# Patient Record
Sex: FEMALE | Race: WHITE | NOT HISPANIC OR LATINO | Employment: PART TIME | URBAN - METROPOLITAN AREA
[De-identification: names, ages, dates, MRNs, and addresses within clinical notes are randomized per-mention and may not be internally consistent; named-entity substitution may affect disease eponyms.]

---

## 2017-01-12 ENCOUNTER — PRENATAL OFFICE VISIT - HEALTHEAST (OUTPATIENT)
Dept: MIDWIFE SERVICES | Facility: CLINIC | Age: 42
End: 2017-01-12

## 2017-01-12 DIAGNOSIS — L29.9 ITCHING: ICD-10-CM

## 2017-01-12 DIAGNOSIS — Z34.00 SUPERVISION OF NORMAL FIRST PREGNANCY: ICD-10-CM

## 2017-01-12 DIAGNOSIS — F17.200 TOBACCO USE DISORDER: ICD-10-CM

## 2017-01-12 DIAGNOSIS — Z00.00 HEALTH CARE MAINTENANCE: ICD-10-CM

## 2017-01-12 DIAGNOSIS — F41.9 ANXIETY: ICD-10-CM

## 2017-01-12 ASSESSMENT — MIFFLIN-ST. JEOR: SCORE: 1312.46

## 2017-01-13 LAB — SYPHILIS RPR SCREEN - HISTORICAL: NORMAL

## 2017-01-30 ENCOUNTER — PRENATAL OFFICE VISIT - HEALTHEAST (OUTPATIENT)
Dept: MIDWIFE SERVICES | Facility: CLINIC | Age: 42
End: 2017-01-30

## 2017-01-30 DIAGNOSIS — O09.513 ENCOUNTER FOR SUPERVISION OF PRIMIGRAVIDA OF ADVANCED MATERNAL AGE IN THIRD TRIMESTER: ICD-10-CM

## 2017-01-30 ASSESSMENT — MIFFLIN-ST. JEOR: SCORE: 1362.35

## 2017-02-16 ENCOUNTER — PRENATAL OFFICE VISIT - HEALTHEAST (OUTPATIENT)
Dept: MIDWIFE SERVICES | Facility: CLINIC | Age: 42
End: 2017-02-16

## 2017-02-16 DIAGNOSIS — Z34.00 SUPERVISION OF NORMAL FIRST PREGNANCY: ICD-10-CM

## 2017-02-16 DIAGNOSIS — O09.513 ENCOUNTER FOR SUPERVISION OF PRIMIGRAVIDA OF ADVANCED MATERNAL AGE IN THIRD TRIMESTER: ICD-10-CM

## 2017-02-16 ASSESSMENT — MIFFLIN-ST. JEOR: SCORE: 1370.06

## 2017-03-06 ENCOUNTER — PRENATAL OFFICE VISIT - HEALTHEAST (OUTPATIENT)
Dept: MIDWIFE SERVICES | Facility: CLINIC | Age: 42
End: 2017-03-06

## 2017-03-06 DIAGNOSIS — Z34.00 SUPERVISION OF NORMAL FIRST PREGNANCY: ICD-10-CM

## 2017-03-06 ASSESSMENT — MIFFLIN-ST. JEOR: SCORE: 1400.91

## 2017-03-16 ENCOUNTER — PRENATAL OFFICE VISIT - HEALTHEAST (OUTPATIENT)
Dept: MIDWIFE SERVICES | Facility: CLINIC | Age: 42
End: 2017-03-16

## 2017-03-16 DIAGNOSIS — H65.92 OTITIS MEDIA WITH EFFUSION, LEFT: ICD-10-CM

## 2017-03-16 DIAGNOSIS — H10.32 ACUTE BACTERIAL CONJUNCTIVITIS OF LEFT EYE: ICD-10-CM

## 2017-03-16 DIAGNOSIS — Z34.00 SUPERVISION OF NORMAL FIRST PREGNANCY: ICD-10-CM

## 2017-03-20 ENCOUNTER — PRENATAL OFFICE VISIT - HEALTHEAST (OUTPATIENT)
Dept: MIDWIFE SERVICES | Facility: CLINIC | Age: 42
End: 2017-03-20

## 2017-03-20 DIAGNOSIS — O09.513 ENCOUNTER FOR SUPERVISION OF PRIMIGRAVIDA OF ADVANCED MATERNAL AGE IN THIRD TRIMESTER: ICD-10-CM

## 2017-03-20 ASSESSMENT — MIFFLIN-ST. JEOR: SCORE: 1453.98

## 2017-03-27 ENCOUNTER — PRENATAL OFFICE VISIT - HEALTHEAST (OUTPATIENT)
Dept: MIDWIFE SERVICES | Facility: CLINIC | Age: 42
End: 2017-03-27

## 2017-03-27 DIAGNOSIS — O09.513 ENCOUNTER FOR SUPERVISION OF PRIMIGRAVIDA OF ADVANCED MATERNAL AGE IN THIRD TRIMESTER: ICD-10-CM

## 2017-03-27 ASSESSMENT — MIFFLIN-ST. JEOR: SCORE: 1444

## 2017-04-03 ENCOUNTER — SURGERY - HEALTHEAST (OUTPATIENT)
Dept: OBGYN | Facility: HOSPITAL | Age: 42
End: 2017-04-03

## 2017-04-03 ENCOUNTER — ANESTHESIA - HEALTHEAST (OUTPATIENT)
Dept: OBGYN | Facility: HOSPITAL | Age: 42
End: 2017-04-03

## 2017-04-03 ASSESSMENT — MIFFLIN-ST. JEOR: SCORE: 1444

## 2017-04-06 ENCOUNTER — HOME CARE/HOSPICE - HEALTHEAST (OUTPATIENT)
Dept: HOME HEALTH SERVICES | Facility: HOME HEALTH | Age: 42
End: 2017-04-06

## 2017-04-08 ENCOUNTER — HOME CARE/HOSPICE - HEALTHEAST (OUTPATIENT)
Dept: HOME HEALTH SERVICES | Facility: HOME HEALTH | Age: 42
End: 2017-04-08

## 2017-04-11 ENCOUNTER — COMMUNICATION - HEALTHEAST (OUTPATIENT)
Dept: MIDWIFE SERVICES | Facility: CLINIC | Age: 42
End: 2017-04-11

## 2017-04-12 ENCOUNTER — COMMUNICATION - HEALTHEAST (OUTPATIENT)
Dept: FAMILY MEDICINE | Facility: CLINIC | Age: 42
End: 2017-04-12

## 2017-05-22 ENCOUNTER — OFFICE VISIT - HEALTHEAST (OUTPATIENT)
Dept: MIDWIFE SERVICES | Facility: CLINIC | Age: 42
End: 2017-05-22

## 2017-05-22 DIAGNOSIS — Z00.00 HEALTH CARE MAINTENANCE: ICD-10-CM

## 2017-05-22 DIAGNOSIS — Z30.018 ENCOUNTER FOR INITIAL PRESCRIPTION OF OTHER CONTRACEPTIVES: ICD-10-CM

## 2017-05-22 ASSESSMENT — MIFFLIN-ST. JEOR: SCORE: 1382.76

## 2017-05-26 LAB
HPV INTERPRETATION - HISTORICAL: ABNORMAL
HPV INTERPRETER - HISTORICAL: ABNORMAL

## 2017-05-30 ENCOUNTER — COMMUNICATION - HEALTHEAST (OUTPATIENT)
Dept: OBGYN | Facility: CLINIC | Age: 42
End: 2017-05-30

## 2017-05-30 DIAGNOSIS — R87.621: ICD-10-CM

## 2017-05-30 LAB
BKR LAB AP ABNORMAL BLEEDING: NO
BKR LAB AP BIRTH CONTROL/HORMONES: ABNORMAL
BKR LAB AP CERVICAL APPEARANCE: ABNORMAL
BKR LAB AP GYN ADEQUACY: ABNORMAL
BKR LAB AP GYN INTERPRETATION: ABNORMAL
BKR LAB AP HPV REFLEX: ABNORMAL
BKR LAB AP LMP: ABNORMAL
BKR LAB AP PATIENT STATUS: ABNORMAL
BKR LAB AP PREVIOUS ABNORMAL: ABNORMAL
BKR LAB AP PREVIOUS NORMAL: ABNORMAL
HIGH RISK?: NO
PATH REPORT.COMMENTS IMP SPEC: ABNORMAL
RESULT FLAG (HE HISTORICAL CONVERSION): ABNORMAL

## 2017-06-10 ENCOUNTER — COMMUNICATION - HEALTHEAST (OUTPATIENT)
Dept: MIDWIFE SERVICES | Facility: CLINIC | Age: 42
End: 2017-06-10

## 2017-06-12 ENCOUNTER — COMMUNICATION - HEALTHEAST (OUTPATIENT)
Dept: MIDWIFE SERVICES | Facility: CLINIC | Age: 42
End: 2017-06-12

## 2017-06-12 ENCOUNTER — AMBULATORY - HEALTHEAST (OUTPATIENT)
Dept: OBGYN | Facility: CLINIC | Age: 42
End: 2017-06-12

## 2017-06-12 ENCOUNTER — COMMUNICATION - HEALTHEAST (OUTPATIENT)
Dept: OBGYN | Facility: CLINIC | Age: 42
End: 2017-06-12

## 2017-07-12 ENCOUNTER — OFFICE VISIT - HEALTHEAST (OUTPATIENT)
Dept: FAMILY MEDICINE | Facility: CLINIC | Age: 42
End: 2017-07-12

## 2017-07-12 DIAGNOSIS — F98.8 ATTENTION DEFICIT DISORDER: ICD-10-CM

## 2017-07-12 DIAGNOSIS — F41.9 ANXIETY: ICD-10-CM

## 2017-07-18 ENCOUNTER — AMBULATORY - HEALTHEAST (OUTPATIENT)
Dept: OBGYN | Facility: CLINIC | Age: 42
End: 2017-07-18

## 2017-07-18 DIAGNOSIS — Z01.812 PRE-PROCEDURE LAB EXAM: ICD-10-CM

## 2017-07-18 DIAGNOSIS — R87.611 ATYPICAL SQUAMOUS CELLS CANNOT EXCLUDE HIGH GRADE SQUAMOUS INTRAEPITHELIAL LESION ON CYTOLOGIC SMEAR OF CERVIX (ASC-H): ICD-10-CM

## 2017-07-18 ASSESSMENT — MIFFLIN-ST. JEOR: SCORE: 1371.42

## 2017-07-24 LAB
LAB AP CHARGES (HE HISTORICAL CONVERSION): NORMAL
PATH REPORT.COMMENTS IMP SPEC: NORMAL
PATH REPORT.COMMENTS IMP SPEC: NORMAL
PATH REPORT.FINAL DX SPEC: NORMAL
PATH REPORT.GROSS SPEC: NORMAL
PATH REPORT.MICROSCOPIC SPEC OTHER STN: NORMAL
PATH REPORT.MICROSCOPIC SPEC OTHER STN: NORMAL
PATH REPORT.RELEVANT HX SPEC: NORMAL
RESULT FLAG (HE HISTORICAL CONVERSION): NORMAL

## 2017-08-08 ENCOUNTER — OFFICE VISIT - HEALTHEAST (OUTPATIENT)
Dept: FAMILY MEDICINE | Facility: CLINIC | Age: 42
End: 2017-08-08

## 2017-08-08 DIAGNOSIS — F41.9 ANXIETY: ICD-10-CM

## 2017-08-08 DIAGNOSIS — F98.8 ATTENTION DEFICIT DISORDER: ICD-10-CM

## 2017-08-08 ASSESSMENT — MIFFLIN-ST. JEOR: SCORE: 1391.38

## 2017-09-05 ENCOUNTER — COMMUNICATION - HEALTHEAST (OUTPATIENT)
Dept: FAMILY MEDICINE | Facility: CLINIC | Age: 42
End: 2017-09-05

## 2017-09-05 ENCOUNTER — AMBULATORY - HEALTHEAST (OUTPATIENT)
Dept: FAMILY MEDICINE | Facility: CLINIC | Age: 42
End: 2017-09-05

## 2017-09-05 DIAGNOSIS — F98.8 ATTENTION DEFICIT DISORDER: ICD-10-CM

## 2017-09-06 ENCOUNTER — COMMUNICATION - HEALTHEAST (OUTPATIENT)
Dept: FAMILY MEDICINE | Facility: CLINIC | Age: 42
End: 2017-09-06

## 2017-09-26 ENCOUNTER — COMMUNICATION - HEALTHEAST (OUTPATIENT)
Dept: FAMILY MEDICINE | Facility: CLINIC | Age: 42
End: 2017-09-26

## 2018-03-28 ENCOUNTER — COMMUNICATION - HEALTHEAST (OUTPATIENT)
Dept: MIDWIFE SERVICES | Facility: CLINIC | Age: 43
End: 2018-03-28

## 2018-07-31 ENCOUNTER — AMBULATORY - HEALTHEAST (OUTPATIENT)
Dept: FAMILY MEDICINE | Facility: CLINIC | Age: 43
End: 2018-07-31

## 2018-07-31 DIAGNOSIS — F41.9 ANXIETY: ICD-10-CM

## 2018-08-23 ENCOUNTER — OFFICE VISIT - HEALTHEAST (OUTPATIENT)
Dept: BEHAVIORAL HEALTH | Facility: CLINIC | Age: 43
End: 2018-08-23

## 2018-08-23 DIAGNOSIS — F98.8 ATTENTION DEFICIT DISORDER: ICD-10-CM

## 2018-08-23 DIAGNOSIS — F43.23 ADJUSTMENT DISORDER WITH MIXED ANXIETY AND DEPRESSED MOOD: ICD-10-CM

## 2018-09-11 ENCOUNTER — OFFICE VISIT - HEALTHEAST (OUTPATIENT)
Dept: BEHAVIORAL HEALTH | Facility: CLINIC | Age: 43
End: 2018-09-11

## 2018-09-11 DIAGNOSIS — F90.9 ATTENTION DEFICIT HYPERACTIVITY DISORDER (ADHD), UNSPECIFIED ADHD TYPE: ICD-10-CM

## 2018-09-11 DIAGNOSIS — F43.23 ADJUSTMENT DISORDER WITH MIXED ANXIETY AND DEPRESSED MOOD: ICD-10-CM

## 2018-09-17 ENCOUNTER — COMMUNICATION - HEALTHEAST (OUTPATIENT)
Dept: FAMILY MEDICINE | Facility: CLINIC | Age: 43
End: 2018-09-17

## 2018-10-03 ENCOUNTER — AMBULATORY - HEALTHEAST (OUTPATIENT)
Dept: BEHAVIORAL HEALTH | Facility: CLINIC | Age: 43
End: 2018-10-03

## 2019-12-17 ENCOUNTER — COMMUNICATION - HEALTHEAST (OUTPATIENT)
Dept: FAMILY MEDICINE | Facility: CLINIC | Age: 44
End: 2019-12-17

## 2019-12-18 ENCOUNTER — OFFICE VISIT - HEALTHEAST (OUTPATIENT)
Dept: FAMILY MEDICINE | Facility: CLINIC | Age: 44
End: 2019-12-18

## 2019-12-18 DIAGNOSIS — F98.8 ATTENTION DEFICIT DISORDER, UNSPECIFIED HYPERACTIVITY PRESENCE: ICD-10-CM

## 2019-12-18 DIAGNOSIS — F41.9 ANXIETY: ICD-10-CM

## 2019-12-18 DIAGNOSIS — Z32.01 POSITIVE PREGNANCY TEST: ICD-10-CM

## 2019-12-18 DIAGNOSIS — O34.219 HISTORY OF CESAREAN DELIVERY, CURRENTLY PREGNANT: ICD-10-CM

## 2019-12-18 LAB — HCG UR QL: POSITIVE

## 2019-12-18 RX ORDER — DEXTROAMPHETAMINE SACCHARATE, AMPHETAMINE ASPARTATE MONOHYDRATE, DEXTROAMPHETAMINE SULFATE AND AMPHETAMINE SULFATE 7.5; 7.5; 7.5; 7.5 MG/1; MG/1; MG/1; MG/1
30 CAPSULE, EXTENDED RELEASE ORAL DAILY
Status: SHIPPED | COMMUNITY
Start: 2019-12-04 | End: 2022-03-21

## 2019-12-18 ASSESSMENT — MIFFLIN-ST. JEOR: SCORE: 1296.86

## 2020-01-19 ENCOUNTER — COMMUNICATION - HEALTHEAST (OUTPATIENT)
Dept: SCHEDULING | Facility: CLINIC | Age: 45
End: 2020-01-19

## 2020-01-21 ENCOUNTER — COMMUNICATION - HEALTHEAST (OUTPATIENT)
Dept: SCHEDULING | Facility: CLINIC | Age: 45
End: 2020-01-21

## 2020-01-24 ENCOUNTER — RECORDS - HEALTHEAST (OUTPATIENT)
Dept: ADMINISTRATIVE | Facility: OTHER | Age: 45
End: 2020-01-24
Payer: COMMERCIAL

## 2020-11-03 ENCOUNTER — OFFICE VISIT - HEALTHEAST (OUTPATIENT)
Dept: FAMILY MEDICINE | Facility: CLINIC | Age: 45
End: 2020-11-03

## 2020-11-03 DIAGNOSIS — Z12.4 ENCOUNTER FOR SCREENING FOR CERVICAL CANCER: ICD-10-CM

## 2020-11-03 DIAGNOSIS — R87.611 ATYPICAL SQUAMOUS CELLS CANNOT EXCLUDE HIGH GRADE SQUAMOUS INTRAEPITHELIAL LESION ON CYTOLOGIC SMEAR OF CERVIX (ASC-H): ICD-10-CM

## 2020-11-03 DIAGNOSIS — Z12.31 VISIT FOR SCREENING MAMMOGRAM: ICD-10-CM

## 2020-11-03 DIAGNOSIS — G25.81 RESTLESS LEG SYNDROME: ICD-10-CM

## 2020-11-03 DIAGNOSIS — Z00.00 ENCOUNTER FOR GENERAL ADULT MEDICAL EXAMINATION WITHOUT ABNORMAL FINDINGS: ICD-10-CM

## 2020-11-03 DIAGNOSIS — F41.9 ANXIETY: ICD-10-CM

## 2020-11-03 DIAGNOSIS — H91.91 HEARING LOSS OF RIGHT EAR, UNSPECIFIED HEARING LOSS TYPE: ICD-10-CM

## 2020-11-03 DIAGNOSIS — F98.8 ATTENTION DEFICIT DISORDER, UNSPECIFIED HYPERACTIVITY PRESENCE: ICD-10-CM

## 2020-11-03 LAB
ANION GAP SERPL CALCULATED.3IONS-SCNC: 7 MMOL/L (ref 5–18)
BUN SERPL-MCNC: 18 MG/DL (ref 8–22)
CALCIUM SERPL-MCNC: 9.5 MG/DL (ref 8.5–10.5)
CHLORIDE BLD-SCNC: 103 MMOL/L (ref 98–107)
CO2 SERPL-SCNC: 29 MMOL/L (ref 22–31)
CREAT SERPL-MCNC: 0.89 MG/DL (ref 0.6–1.1)
ERYTHROCYTE [DISTWIDTH] IN BLOOD BY AUTOMATED COUNT: 10.4 % (ref 11–14.5)
FERRITIN SERPL-MCNC: 57 NG/ML (ref 10–130)
GFR SERPL CREATININE-BSD FRML MDRD: >60 ML/MIN/1.73M2
GLUCOSE BLD-MCNC: 88 MG/DL (ref 70–125)
HCT VFR BLD AUTO: 39.6 % (ref 35–47)
HGB BLD-MCNC: 13.4 G/DL (ref 12–16)
MCH RBC QN AUTO: 31 PG (ref 27–34)
MCHC RBC AUTO-ENTMCNC: 33.9 G/DL (ref 32–36)
MCV RBC AUTO: 92 FL (ref 80–100)
PLATELET # BLD AUTO: 361 THOU/UL (ref 140–440)
PMV BLD AUTO: 7.3 FL (ref 7–10)
POTASSIUM BLD-SCNC: 3.8 MMOL/L (ref 3.5–5)
RBC # BLD AUTO: 4.32 MILL/UL (ref 3.8–5.4)
SODIUM SERPL-SCNC: 139 MMOL/L (ref 136–145)
WBC: 7.8 THOU/UL (ref 4–11)

## 2020-11-03 ASSESSMENT — ANXIETY QUESTIONNAIRES
5. BEING SO RESTLESS THAT IT IS HARD TO SIT STILL: NOT AT ALL
2. NOT BEING ABLE TO STOP OR CONTROL WORRYING: SEVERAL DAYS
GAD7 TOTAL SCORE: 6
6. BECOMING EASILY ANNOYED OR IRRITABLE: SEVERAL DAYS
7. FEELING AFRAID AS IF SOMETHING AWFUL MIGHT HAPPEN: SEVERAL DAYS
1. FEELING NERVOUS, ANXIOUS, OR ON EDGE: SEVERAL DAYS
3. WORRYING TOO MUCH ABOUT DIFFERENT THINGS: SEVERAL DAYS
4. TROUBLE RELAXING: SEVERAL DAYS
IF YOU CHECKED OFF ANY PROBLEMS ON THIS QUESTIONNAIRE, HOW DIFFICULT HAVE THESE PROBLEMS MADE IT FOR YOU TO DO YOUR WORK, TAKE CARE OF THINGS AT HOME, OR GET ALONG WITH OTHER PEOPLE: SOMEWHAT DIFFICULT

## 2020-11-03 ASSESSMENT — PATIENT HEALTH QUESTIONNAIRE - PHQ9: SUM OF ALL RESPONSES TO PHQ QUESTIONS 1-9: 4

## 2020-11-03 ASSESSMENT — MIFFLIN-ST. JEOR: SCORE: 1320.17

## 2020-11-04 LAB
HPV SOURCE: NORMAL
HUMAN PAPILLOMA VIRUS 16 DNA: NEGATIVE
HUMAN PAPILLOMA VIRUS 18 DNA: NEGATIVE
HUMAN PAPILLOMA VIRUS FINAL DIAGNOSIS: NORMAL
HUMAN PAPILLOMA VIRUS OTHER HR: NEGATIVE
SPECIMEN DESCRIPTION: NORMAL

## 2021-01-14 ENCOUNTER — OFFICE VISIT - HEALTHEAST (OUTPATIENT)
Dept: AUDIOLOGY | Facility: CLINIC | Age: 46
End: 2021-01-14

## 2021-01-14 DIAGNOSIS — H93.13 TINNITUS OF BOTH EARS: ICD-10-CM

## 2021-05-26 ASSESSMENT — PATIENT HEALTH QUESTIONNAIRE - PHQ9: SUM OF ALL RESPONSES TO PHQ QUESTIONS 1-9: 4

## 2021-05-28 ASSESSMENT — ANXIETY QUESTIONNAIRES: GAD7 TOTAL SCORE: 6

## 2021-05-30 VITALS — BODY MASS INDEX: 27.49 KG/M2 | WEIGHT: 161 LBS | HEIGHT: 64 IN

## 2021-05-30 VITALS — BODY MASS INDEX: 30.56 KG/M2 | WEIGHT: 179 LBS | HEIGHT: 64 IN

## 2021-05-30 VITALS — HEIGHT: 64 IN | WEIGHT: 169.5 LBS | BODY MASS INDEX: 28.94 KG/M2

## 2021-05-30 VITALS — BODY MASS INDEX: 30.93 KG/M2 | HEIGHT: 64 IN | WEIGHT: 181.2 LBS

## 2021-05-30 VITALS — BODY MASS INDEX: 30.69 KG/M2 | WEIGHT: 176 LBS

## 2021-05-30 VITALS — BODY MASS INDEX: 27.78 KG/M2 | HEIGHT: 64 IN | WEIGHT: 162.7 LBS

## 2021-05-30 VITALS — WEIGHT: 150 LBS | BODY MASS INDEX: 25.61 KG/M2 | HEIGHT: 64 IN

## 2021-05-30 VITALS — WEIGHT: 179 LBS | HEIGHT: 64 IN | BODY MASS INDEX: 30.56 KG/M2

## 2021-05-31 VITALS — WEIGHT: 167.4 LBS | HEIGHT: 64 IN | BODY MASS INDEX: 28.58 KG/M2

## 2021-05-31 VITALS — WEIGHT: 162.5 LBS | BODY MASS INDEX: 28.33 KG/M2

## 2021-05-31 VITALS — BODY MASS INDEX: 27.83 KG/M2 | WEIGHT: 163 LBS | HEIGHT: 64 IN

## 2021-05-31 VITALS — BODY MASS INDEX: 28.25 KG/M2 | WEIGHT: 165.5 LBS | HEIGHT: 64 IN

## 2021-06-04 VITALS
HEIGHT: 64 IN | HEART RATE: 88 BPM | SYSTOLIC BLOOD PRESSURE: 122 MMHG | WEIGHT: 146.56 LBS | DIASTOLIC BLOOD PRESSURE: 66 MMHG | BODY MASS INDEX: 25.02 KG/M2 | RESPIRATION RATE: 16 BRPM | TEMPERATURE: 98.3 F

## 2021-06-04 NOTE — TELEPHONE ENCOUNTER
Pt does not want to be seen at [place] . Wants to go to [facility] [place]

## 2021-06-04 NOTE — TELEPHONE ENCOUNTER
New Appointment Needed  What is the reason for the visit:    Pregnancy Confirmation Appt Needed  When was the first day of your last menstrual cycle?: 11/5/19  Have you done a home pregnancy test?: Yes: 12/11/19, 2 positive tests.    Provider Preference: Any available  How soon do you need to be seen?: 1st or 2nd week of January, afternoon  Waitlist offered?: No  Okay to leave a detailed message:  Yes

## 2021-06-04 NOTE — TELEPHONE ENCOUNTER
Routing to correct pool. This should of be routed to [place] and not [facility].

## 2021-06-04 NOTE — PROGRESS NOTES
Assessment / Impression     1. Positive pregnancy test  Pregnancy, Urine    Ambulatory referral to Obstetrics / Gynecology   2. History of  delivery, currently pregnant     3. Attention deficit disorder, unspecified hyperactivity presence     4. Anxiety           Plan:     We checked a urine pregnancy test which was positive.  She is 6 weeks and 1 day gestation based on her LMP of 2019.  I recommended she take a prenatal vitamin with folic acid.  She is currently taking multivitamin with vitamin D.  I recommended she increase her folic acid intake as well.  She was given a referral to OB/GYN as she is planning to have a repeat .  I recommended she stay off the Adderall.  She may continue sertraline.  Our  is helping to arrange her OB/GYN appointment.    Subjective:      HPI: Marley Weldon is a 44 y.o. female who presents to the clinic for pregnancy confirmation.  She reports having few positive pregnancy tests at home.  She has a 2-1/2-year-old child that was born via  due to fetal distress.  She reports that there was twisted umbilical cord that may have caused this.  She describes having issues with restless leg syndrome and carpal tunnel syndrome during her pregnancy, but otherwise did well.  She has a history of ADHD and anxiety.  She takes Adderall and sertraline.  She has not taken the Adderall since having the positive pregnancy test at home.        Review of Systems  All other systems reviewed and are negative.     Social History     Tobacco Use   Smoking Status Former Smoker   Smokeless Tobacco Never Used       Family History   Problem Relation Age of Onset     No Medical Problems Mother      No Medical Problems Father      Cancer Maternal Grandmother      Breast cancer Paternal Grandmother      Dementia Paternal Grandmother        Objective:     /66 (Patient Site: Left Arm, Patient Position: Sitting, Cuff Size: Adult Regular)   Pulse 88   Temp 98.3  F (36.8  " C) (Oral)   Resp 16   Ht 5' 3.5\" (1.613 m)   Wt 146 lb 9 oz (66.5 kg)   LMP 11/05/2019 (Exact Date)   Breastfeeding No   BMI 25.56 kg/m    Physical Examination: General appearance - alert, well appearing, and in no distress  Eyes: pupils equal and reactive, extraocular eye movements intact  Mouth: mucous membranes moist, pharynx normal without lesions  Neck: supple, no significant adenopathy  Lungs: clear to auscultation, no wheezes, rales or rhonchi, symmetric air entry  Heart: normal rate, regular rhythm, normal S1, S2, no murmurs, rubs, clicks or gallops  Abdomen: soft, nontender, nondistended, no masses or organomegaly  Neurological: alert, oriented, normal speech, no focal findings or movement disorder noted.    Extremities: No edema, no clubbing or cyanosis  Psychiatric: Normal affect. Does not appear anxious or depressed.    Recent Results (from the past 168 hour(s))   Pregnancy, Urine   Result Value Ref Range    Pregnancy Test, Urine Positive (!) Negative       Current Outpatient Medications   Medication Sig Note     cholecalciferol, vitamin D3, (VITAMIN D3) 1,000 unit capsule Take 2,000 Units by mouth daily.       dextroamphetamine-amphetamine (ADDERALL XR) 30 MG 24 hr capsule daily. 12/18/2019: Not as much since POS pregnancy.     multivitamin therapeutic (THERAGRAN) tablet Take 1 tablet by mouth daily.      sertraline (ZOLOFT) 50 MG tablet Take 1 tablet (50 mg total) by mouth daily.      calcium-vitamin D (CALCIUM-VITAMIN D) 500 mg(1,250mg) -200 unit per tablet Take 1 tablet by mouth 2 (two) times a day with meals.      norethindrone (MICRONOR) 0.35 mg tablet Take 1 tablet (0.35 mg total) by mouth daily. 12/18/2019: Stopped June 2018     "

## 2021-06-05 VITALS
RESPIRATION RATE: 16 BRPM | DIASTOLIC BLOOD PRESSURE: 78 MMHG | HEART RATE: 80 BPM | BODY MASS INDEX: 26.95 KG/M2 | WEIGHT: 152.1 LBS | SYSTOLIC BLOOD PRESSURE: 114 MMHG | HEIGHT: 63 IN

## 2021-06-05 NOTE — TELEPHONE ENCOUNTER
Triage call:   Miscarriage - started on Sunday- bleeding   [facility] ER- US  - fetus had no heart beat- 11 W 0 d - fetus is measuring at 6 W     Reports that she has been bleeding all night- sleeping on a towel- she reports that the bleeding at this time is.    Some Abdominal pain this morning- took ibuprofen- no pain at this time   Not sleeping and is very tired  Not dizzy or light headed but would states that she would not be able to drive   Large clots being passed - no grey tissue noted - laying on a towel    Triaged to be seen again in the ER due to the severity of bleeding. Patient will have her mother in law come and get her and bring her to the ER at this time.     Saritha Shepherd RN BSBA [facility] Triage/Med Refill 1/21/2020 8:06 AM    Reason for Disposition    SEVERE vaginal bleeding (i.e., soaking 2 pads / hour, large blood clots) and present for 2 or more hours    Protocols used: PREGNANCY - VAGINAL BLEEDING LESS THAN 20 WEEKS EGA-A-OH

## 2021-06-05 NOTE — TELEPHONE ENCOUNTER
Pt is calling in from work, speaking softly, and crying. Pt is 10 weeks pregnant, reports she thinks she just had a miscarriage, but cant talk very loud because she does not want anyone from work to hear.  Pt reports bright red bleeding, with a very large clot, unsure if it is tissue. Pt did not save the clot/tissue. Pt denies abdominal pain, but feels dizzy, but says that is nothing new for her. Pt is unsure if she has a fever, but denies any burning pain with urination. Pt sounds very upset.   Per protocol pt should be evaluated in the ER, so should have someone drive her to the ER. Pt is saying she cant leave work, so unsure if she will go. Advised pt to call back if symptoms worsen before she is able to be seen.    Daniel Simon RN [facility] Triage/Medication Refill         Reason for Disposition    Passed tissue (e.g., gray-white)    Protocols used: PREGNANCY - VAGINAL BLEEDING LESS THAN 20 WEEKS EGA-A-

## 2021-06-08 NOTE — PROGRESS NOTES
"Marley is here with her partner Clark.  She went to the dentist earlier today and they were unsucessful at giving her proper anesthesia coverage after several attempts at numbing her mouth.  She left without having her tooth filled and is now feeling uncomfortable.  Marley has attended CBE at various childbirth collective classes and feels they were helpful.  She has been taking PNVs, vitamin D3, and is also taking an iron supplement.  She states \"I have gotten a lot healthier in this pregnancy\".  Discussed exercise and she reports increasing her activity. She has had a 1 lb weight gain since her last visit 2.5 weeks ago.  Previous  Encouraged at least 30 minutes of walking or other exercise daily on most days. Denies drug use. Has preregistered at the hospital.  She is not planning on hiring a .  PTL teaching done and s/sx discussed.  RTC in 2 weeks.      "

## 2021-06-08 NOTE — PROGRESS NOTES
"Marley presents alone today for a routine OB visit.  Work is exhausting (30-32 hours a week), is able to eat when wants (\"I eat a lot at work\"). Eats soups, salads, yogurt shakes with peanut-butter and chocolate. Goes out to eat a few times a week. Was keeping track of food, but has stopped. Milk daily. Eating fried foods. Discussed healthy eating at length with patient but also applauded healthy decisions such as no alcohol or drug use in pregnancy. Denies HA, vision changes, or abnormal swelling. Warning signs of PIH discussed.   Took pseudoephedrine and benadryl (1-2 days) a few weeks ago, also states had some Adderral left over and took this (it helped her get more done). To THC since December. No tobacco use. No  ETOH and no drugs.  Looking into pediatricians. Going to CBC classes; enjoying these classes. Also going to a birth one (has a video and looking forward to this) and breastfeeding classes (plans on 1-2).   Denies contractions, had some cramping a few weeks ago, went away with hydration. No bleeding no pain with sex no abnormal discharge. Reviewed s/sx of PTL and discussed fetal movement, endorses normal fetal movement.   UDS at next visit. All questions answered.    "

## 2021-06-09 NOTE — PROGRESS NOTES
"Marley is here by herself today.  Her baby is active.  She has an obvious case of left eye conjunctivitis and states she cannot hear in her left ear due to congestion. Her left ear has been plugged x 1 week following intense left ear pain.  Yesterday she noticed redness and crusting in her left eye.  I requested that Charissa Bianchi, TAY, FNP, CNJEFERSON assess her sx.  Per Charissa,  physical exam shows no ear infection but does show signs of ear congestion. Recommended sudafed for congestion and Charissa prescribed Erythromycin ointment for left eye conjunctivitis.  Discussed how contagious this is and to wash her wands often.  Questions answered.  Marley reports she is still taking Zoloft and that her mood sx have not been different, she feels stable.  She denies smoking or drug use. She is upset and is tearful when talking about having to possibly \"get rid of one of the cats\".  She states it has mats in her hair,  says \"she is kind of gunner\" and scratches at her door at night which wakes her up.  She feels uncertain that the cat is safe to be around the baby and her partner has pointed out that the cat wakes them up quite a bit at night and the cat combined with a new baby may not allow for much sleep.  Offered support and listening. I reassured her that she would come to the right decision and to trust herself.   Urine tox screen, GBS, and hgb today.  RTC in 1 week.  EPDS at next visit.           "

## 2021-06-09 NOTE — PROGRESS NOTES
1) Reviewed GBS testing results.  Negative.  2) Reviewed CNM on-call number and when to call including for s/s of labor, srom, decreased fetal movement, bleeding, or other warning signs.  3) Having increased pelvic pressure and feels that baby has dropped down, having some nesting feelings, more energy getting the house together.   4) No contractions, denies LOF or bleeding, but increased physiologic discharge, having some stress-incontinence.  5) Patient states she had cervix checkec last week, wasn't told dilation, not noted.    6) Reviewed signs of early versus active labor at length. Discussed expectations for fetal movement. All questions answered.   7) EPDS 8

## 2021-06-09 NOTE — ANESTHESIA PREPROCEDURE EVALUATION
Anesthesia Evaluation      Patient summary reviewed   No history of anesthetic complications     Airway   Mallampati: II  Neck ROM: full   Pulmonary - negative ROS and normal exam   (+) a smoker                         Cardiovascular - negative ROS  Rhythm: regular  Rate: normal,         Neuro/Psych    (+) seizures (Sz x 1 in 2011), depression, anxiety/panic attacks,     Comments: ADHD  Methamphetamaine use 5 months ago  Marijuana use    Endo/Other    (+) pregnant     GI/Hepatic/Renal    (+) GERD,             Dental - normal exam                        Anesthesia Plan  Planned anesthetic: epidural    ASA 2     Anesthetic plan and risks discussed with: patient    Post-op plan: routine recovery

## 2021-06-09 NOTE — ANESTHESIA CARE TRANSFER NOTE
Last vitals:   Vitals:    04/03/17 1151   BP: 103/51   Pulse: 88   Resp: 16   Temp: (!) 35.7  C (96.3  F)   SpO2: 97%     Patient's level of consciousness is awake  Spontaneous respirations: yes  Maintains airway independently: yes  Dentition unchanged: yes  Oropharynx: oropharynx clear of all foreign objects    QCDR Measures:  ASA# 20 - Surgical Safety Checklist: ASA20A - Safety Checks Done  PQRS# 430 - Adult PONV Prevention: NA - Not adult patient, not GA or 3 or more risk factors NOT present  ASA# 8 - Peds PONV Prevention: NA - Not pediatric patient, not GA or 2 or more risk factors NOT present  PQRS# 424 - Patsy-op Temp Management: 4559F - At least one body temp DOCUMENTED => 35.5C or 95.9F within required timeframe  PQRS# 426 - PACU Transfer Protocol: - Transfer of care checklist used  ASA# 14 - Acute Post-op Pain: ASA14B - Patient did NOT experience pain >= 7 out of 10    I completed my SBAR handoff to the receiving nurse per policy and procedure.

## 2021-06-09 NOTE — ANESTHESIA POSTPROCEDURE EVALUATION
Patient: Marley Weldon   SECTION  Anesthesia type: regional    Patient location: Labor and Delivery  Last vitals:   Vitals:    17 1302   BP: 100/57   Pulse: 87   Resp:    Temp:    SpO2:      Post vital signs: stable  Level of consciousness: awake and responds to simple questions  Post-anesthesia pain: pain controlled  Post-anesthesia nausea and vomiting: no  Pulmonary: unassisted, return to baseline  Cardiovascular: stable and blood pressure at baseline  Hydration: adequate  Anesthetic events: no    QCDR Measures:  ASA# 11 - Patsy-op Cardiac Arrest: ASA11B - Patient did NOT experience unanticipated cardiac arrest  ASA# 12 - Patsy-op Mortality Rate: ASA12B - Patient did NOT die  ASA# 13 - PACU Re-Intubation Rate: NA - No ETT / LMA used for case  ASA# 10 - Composite Anes Safety: ASA10A - No serious adverse event  ASA# 38 - New Corneal Injury: ASA38A - No new exposure keratitis or corneal abrasion in PACU    Additional Notes:

## 2021-06-09 NOTE — PROGRESS NOTES
1) Reviewed CNM on-call number and when to call including for s/s of labor, srom, decreased fetal movement, bleeding, or other warning signs.  2) About three days ago food poisoning, diarrhea and vomiting. Drinking lots of water, staying well-hydrated.   3) Endorses normal fetal movement. No ctx, no cramping, no bloody show and no LOF. Some tightening's but not painful.   4) No HA, vision changes or epigastric pain.   5) Pre-registration complete. Discussed numbers to call/when. Patient downloaded an misael for contractions! Feels ready for baby.

## 2021-06-09 NOTE — PROGRESS NOTES
1) GBS and hemoglobin next week as 35 5/7 today. Fetus is vertex by leopold's.   2) No contractions, no vaginal bleeding and no change to vaginal discharge other than increased physiologic discharge.   3) Reviewed weight gain, diet, exercise with patient, feels she is staying active and doesn't think she is eating more however hasn't been working as much lately.  4) Reviewed PIH symptoms, all negative.   5) Going to [facility] still for classes, has another class Wednesday.   6) Refilled Zoloft today. EPDS next visit.  7) Stopping work on March 19th.

## 2021-06-09 NOTE — ANESTHESIA PROCEDURE NOTES
Epidural Block    Patient location during procedure: OB  Time Called: 4/3/2017 6:21 AM  Reason for Block:labor epidural  Staffing:  Performing  Anesthesiologist: MADINA SARKAR  Preanesthetic Checklist  Completed: patient identified, risks, benefits, and alternatives discussed, timeout performed, consent obtained, airway assessed, oxygen available, suction available, emergency drugs available and hand hygiene performed  Procedure  Patient position: sitting  Prep: ChloraPrep  Patient monitoring: continuous pulse oximetry, heart rate and blood pressure  Approach: midline  Location: L3-L4  Injection technique: HANY saline  Number of Attempts:1  Needle  Needle type: Malini   Needle gauge: 18 G     Catheter in Space: 3  Assessment  Sensory level: T12  No complications      Additional Notes:  sona well

## 2021-06-09 NOTE — ANESTHESIA PROCEDURE NOTES
Epidural Block    Patient location during procedure: OB    Reason for Block:primary anesthetic    Additional Notes:  Epidural indwelling from Dr Krueger.  His procedure note is the note of record.

## 2021-06-09 NOTE — ANESTHESIA POSTPROCEDURE EVALUATION
Patient: Marley Weldon  * No procedures listed *  Anesthesia type: regional    Patient location: Labor and Delivery  Last vitals:   Vitals:    04/03/17 1347   BP: 110/64   Pulse: 78   Resp: 16   Temp:    SpO2:      Post vital signs: stable  Level of consciousness: awake and responds to simple questions  Post-anesthesia pain: pain controlled  Post-anesthesia nausea and vomiting: no  Pulmonary: unassisted, return to baseline  Cardiovascular: stable and blood pressure at baseline  Hydration: adequate  Anesthetic events: no    QCDR Measures:  ASA# 11 - Patsy-op Cardiac Arrest: ASA11B - Patient did NOT experience unanticipated cardiac arrest  ASA# 12 - Patsy-op Mortality Rate: ASA12B - Patient did NOT die  ASA# 13 - PACU Re-Intubation Rate: NA - No ETT / LMA used for case  ASA# 10 - Composite Anes Safety: ASA10A - No serious adverse event  ASA# 38 - New Corneal Injury: ASA38A - No new exposure keratitis or corneal abrasion in PACU    Additional Notes:

## 2021-06-10 NOTE — PROGRESS NOTES
Assessment:   Normal postpartum exam at ~6 weeks postpartum.   Breast and Formula feeding   HX depression  HX Drug use    Plan:    1. Pap smear done at today's visit. Pap q 5 if WNL, and recommend annual examinations. Also reviewed recommendation for CBE after 40 and discussed various risks and benefits of mammography and differing recommendations regarding when to do (40/45/50). Questions answered  2. Reviewed return to fertility,discussed contraception. Desired contraception: oral progesterone-only contraceptive. Discussed importance of taking at the same time every day and discussed MOA.   3. Discussed resumption of exercise and setting a goal to return to pre-pregnancy weight in the next 6-12 months.   4.  Resumption of intercourse reviewed with possible changes in libido and vaginal lubrication while nursing.  4.  Nutrition and supplements reviewed.  Advised continuation of a prenatal or multivitamin.  5. Adjustment to parenting, self care and open communication with her support system discussed. Warning signs and symptoms related to postpartum mood disorders reviewed.   6. Breastfeeding education given.     Total time spent with patient 40 minutes, >50% counseling, education and coordination of care.    Subjective:     Marley Weldon is a 41 y.o. female who presents for a postpartum visit. She is 6 weeks postpartum following a primary  section.  I have fully reviewed the prenatal and intrapartum course. The delivery was at 39 5/7 gestational weeks. Outcome: primary  section, low transverse incision. Postpartum course has been stable. Baby's name is Delilah and weighed 7 lb 3 oz at time of birth. Baby's course has been stabke. Baby is feeding by both breast milk and formula. Bled for  3 weeks postpartum.  Currently bleeding  no bleeding. Bowel function is normal. Bladder function is normal.   [place] postpartum depression screening score: 6.   She has not resumed regular exercise.   Patient  returns to work in 4 weeks.  Patient has resumed intercourse.   Contraception method is condoms.   ETOH/Tobacco/Drug use: No drugs or tobacco, ETOH minimally (two occasions).    Review of Systems  General:  Denies problem  Eyes: Denies problem  Ears/Nose/Throat: Denies problem  Cardiovascular: Denies problem  Respiratory:  Denies problem  Gastrointestinal:  Denies problem, Genitourinary: Denies problem  Musculoskeletal:  Denies problem  Skin: Denies problem  Neurologic: Denies problem  Psychiatric: Denies Problem  Endocrine: Denies problem    Objective:         Physical Exam:  General Appearance: Alert, cooperative, no distress, appears stated age  Skin: Skin color, texture, turgor normal, no rashes or lesions  Throat: Lips, mucosa, and tongue normal; teeth and gums normal  HEENT: grossly normal; otoscopic and opthalmic exam not performed.   Neck: Supple, symmetrical, trachea midline, no adenopathy;  thyroid: not enlarged, symmetric, no tenderness/mass/nodules  Lungs: Clear to auscultation bilaterally, respirations unlabored  Breasts: Deferred  Heart: Regular rate and rhythm, S1 and S2 normal, no murmur, rub, or gallop  Abdomen: Soft, non-tender, < 1FB diastasis, C/S scar intact and healing.   Pelvic:External genitalia normal without lesions or irritation. Vagina and cervix show no lesions, inflammation, discharge or tenderness. No cystocele, No rectocele. Uterus fully involuted.  No adnexal mass or tenderness.  Extremities: Extremities normal without varicosities or edema       Last Pap: 2011 ASCUS HPV NEG  Immunization History   Administered Date(s) Administered     Influenza,seasonal quad, PF, 36+MOS 11/14/2016     Tdap 05/10/2011, 01/12/2017     Immunization status: up to date and documented
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
## 2021-06-12 NOTE — PROGRESS NOTES
Assessment and Plan:     1. Attention Deficit Disorder Without Hyperactivity     2. Anxiety       I explained to the patient that I uncomfortable providing Adderall knowing that she is breastfeeding.  We discussed the risks of this and the limited studies with breastfeeding.  I told her that I would send a message to her PCP to see if someone is comfortable prescribing it at her primary care clinic.  I did also offer referral to psychiatry.  She left the clinic visibly upset.      Subjective:     Marley is a 42 y.o. female presenting to the clinic for concerns for ADD.  Patient states she was diagnosed with ADD in 1985.  She took Ritalin for 6 months in second grade.  Patient states the medication helps her be more social.  Patient took Adderall for 6 years prior to becoming pregnant 1 year ago.  She restarted the Adderall 1 month ago.  Her daughter is now 4 months old and does breast-feed.  Patient feels as though she is able to complete tasks in a timely manner.  It is also assisting her with suppressing her appetite.  Patient states she was thin prior to pregnancy and is having difficulty losing weight since her pregnancy.  Patient also has a history of anxiety.  She is weaning herself off of Zoloft by alternating 25 mg and 12.5 mg every other day.  She denies thoughts of suicide and thoughts of harming her child.  She has a history of marijuana use and states she stopped smoking this when she was pregnant.    Review of Systems: A complete 14 point review of systems was obtained and is negative or as stated in the history of present illness.    Social History     Social History     Marital status: Single     Spouse name: N/A     Number of children: N/A     Years of education: N/A     Occupational History     Not on file.     Social History Main Topics     Smoking status: Former Smoker     Smokeless tobacco: Never Used     Alcohol use 1.2 oz/week     2 Cans of beer per week     Drug use: No      Comment: hx of  "marijuana; quit with pregnancy      Sexual activity: Yes     Partners: Male     Birth control/ protection: None     Other Topics Concern     Not on file     Social History Narrative       Active Ambulatory Problems     Diagnosis Date Noted     Cannabis abuse      Attention Deficit Disorder Without Hyperactivity      Nicotine Dependence      Anxiety 2017      delivery delivered 2017     Atypical squamous cells cannot exclude high grade squamous intraepithelial lesion on cytologic smear of cervix (ASC-H) 2017     Resolved Ambulatory Problems     Diagnosis Date Noted     Anxiety      Encounter for supervision of primigravida of advanced maternal age in third trimester 2016     Positive urine drug screen 2016     Bacterial vaginosis 2016     Intramural leiomyoma of uterus 2016     Excessive weight gain affecting pregnancy 2016     Itching 2017     Acute bacterial conjunctivitis 2017     Otitis media with effusion, left 2017     Pregnant 2017     Normal labor 2017     Pregnancy 2017     Past Medical History:   Diagnosis Date     Abnormal Pap smear of cervix      Allergic      Depression      HPV (human papilloma virus) infection      Migraine      Seizures      Varicella        Family History   Problem Relation Age of Onset     No Medical Problems Mother      No Medical Problems Father      Cancer Maternal Grandmother      Breast cancer Paternal Grandmother      Dementia Paternal Grandmother        Objective:     /60 (Patient Site: Right Arm, Patient Position: Sitting, Cuff Size: Adult Regular)  Pulse 78  Ht 5' 3.5\" (1.613 m)  Wt 167 lb 6.4 oz (75.9 kg)  SpO2 97%  BMI 29.19 kg/m2    Patient is alert, in no obvious distress.   Skin: Warm, dry.   Other exam deferred per counseling.     "

## 2021-06-12 NOTE — PROGRESS NOTES
The patient was counseled and encouraged to consider modifying their diet and eating habits. She was provided with information on recommended healthy diet options.  The patient was counseled and encouraged to consider modifying their diet and eating habits. She was provided with information on recommended healthy diet options.

## 2021-06-15 ENCOUNTER — COMMUNICATION - HEALTHEAST (OUTPATIENT)
Dept: FAMILY MEDICINE | Facility: CLINIC | Age: 46
End: 2021-06-15

## 2021-06-15 PROBLEM — R87.611 ATYPICAL SQUAMOUS CELLS CANNOT EXCLUDE HIGH GRADE SQUAMOUS INTRAEPITHELIAL LESION ON CYTOLOGIC SMEAR OF CERVIX (ASC-H): Status: ACTIVE | Noted: 2017-05-30

## 2021-06-18 NOTE — PATIENT INSTRUCTIONS - HE
Patient Instructions by Thony Sykes DO at 11/3/2020  2:40 PM     Author: Thony Sykes DO Service: -- Author Type: Physician    Filed: 11/3/2020  4:03 PM Encounter Date: 11/3/2020 Status: Addendum    : Thony Sykes DO (Physician)    Related Notes: Original Note by Thony Sykes DO (Physician) filed at 11/3/2020  3:21 PM         Patient Education   Understanding USDA MyPlate  The USDA (US Department of Agriculture) has guidelines to help you make healthy food choices. These are called MyPlate. MyPlate shows the food groups that make up healthy meals using the image of a place setting. Before you eat, think about the healthiest choices for what to put onto your plate or into your cup or bowl. To learn more about building a healthy plate, visit www.choosemyplate.gov.       The Food Groups    Fruits: Any fruit or 100% fruit juice counts as part of the Fruit Group. Fruits may be fresh, canned, frozen, or dried, and may be whole, cut-up, or pureed. Make half your plate fruits and vegetables.    Vegetables: Any vegetable or 100% vegetable juice counts as a member of the Vegetable Group. Vegetables may be fresh, frozen, canned, or dried. They can be served raw or cooked and may be whole, cut-up, or mashed. Make half your plate fruits and vegetables.     Grains: All foods made from grains are part of the Grains Group. These include wheat, rice, oats, cornmeal, and barley such as bread, pasta, oatmeal, cereal, tortillas, and grits. Grains should be no more than a quarter of your plate. At least half of your grains should be whole grains.    Protein: This group includes meat, poultry, seafood, beans and peas, eggs, processed soy products (like tofu), nuts (including nut butters), and seeds. Make protein choices no more than a quarter of your plate. Meat and poultry choices should be lean or low fat.    Dairy: All fluid milk products and foods made from  milk that contain calcium, like yogurt and cheese are part of the Dairy Group. (Foods that have little calcium, such as cream, butter, and cream cheese, are not part of the group.) Most dairy choices should be low-fat or fat-free.    Oils: These are fats that are liquid at room temperature. They include canola, corn, olive, soybean, and sunflower oil. Foods that are mainly oil include mayonnaise, certain salad dressings, and soft margarines. You should have only 5 to 7 teaspoons of oils a day. You probably already get this much from the food you eat.  Use Fotouper to Help Build Your Meals  The SuperTracker can help you plan and track your meals and activity. You can look up individual foods to see or compare their nutritional value. You can get guidelines for what and how much you should eat. You can compare your food choices. And you can assess personal physical activities and see ways you can improve. Go to www.[employer].gov/Trippycker/.    8609-0117 The "[employer] [address] [place] [address]. All rights reserved. This information is not intended as a substitute for professional medical care. Always follow your healthcare professional's instructions.           Patient Education   Understanding USDA MyPlate  The USDA (US Department of Agriculture) has guidelines to help you make healthy food choices. These are called MyPlate. MyPlate shows the food groups that make up healthy meals using the image of a place setting. Before you eat, think about the healthiest choices for what to put onto your plate or into your cup or bowl. To learn more about building a healthy plate, visit www.[employer].gov.       The Food Groups    Fruits: Any fruit or 100% fruit juice counts as part of the Fruit Group. Fruits may be fresh, canned, frozen, or dried, and may be whole, cut-up, or pureed. Make half your plate fruits and vegetables.    Vegetables: Any vegetable or 100% vegetable juice counts  as a member of the Vegetable Group. Vegetables may be fresh, frozen, canned, or dried. They can be served raw or cooked and may be whole, cut-up, or mashed. Make half your plate fruits and vegetables.     Grains: All foods made from grains are part of the Grains Group. These include wheat, rice, oats, cornmeal, and barley such as bread, pasta, oatmeal, cereal, tortillas, and grits. Grains should be no more than a quarter of your plate. At least half of your grains should be whole grains.    Protein: This group includes meat, poultry, seafood, beans and peas, eggs, processed soy products (like tofu), nuts (including nut butters), and seeds. Make protein choices no more than a quarter of your plate. Meat and poultry choices should be lean or low fat.    Dairy: All fluid milk products and foods made from milk that contain calcium, like yogurt and cheese are part of the Dairy Group. (Foods that have little calcium, such as cream, butter, and cream cheese, are not part of the group.) Most dairy choices should be low-fat or fat-free.    Oils: These are fats that are liquid at room temperature. They include canola, corn, olive, soybean, and sunflower oil. Foods that are mainly oil include mayonnaise, certain salad dressings, and soft margarines. You should have only 5 to 7 teaspoons of oils a day. You probably already get this much from the food you eat.  Use Monitor Backlinkser to Help Build Your Meals  The SuperTracker can help you plan and track your meals and activity. You can look up individual foods to see or compare their nutritional value. You can get guidelines for what and how much you should eat. You can compare your food choices. And you can assess personal physical activities and see ways you can improve. Go to www.choosemyplate.gov/supertracker/.    4139-6109 [employer]. [address] [place] [address], [place] [address]. All rights reserved. This information is not intended as a substitute for professional  medical care. Always follow your healthcare professional's instructions.           Patient Education   Understanding USDA MyPlate  The USDA (US Department of Agriculture) has guidelines to help you make healthy food choices. These are called MyPlate. MyPlate shows the food groups that make up healthy meals using the image of a place setting. Before you eat, think about the healthiest choices for what to put onto your plate or into your cup or bowl. To learn more about building a healthy plate, visit www.choosemyplate.gov.       The Food Groups    Fruits: Any fruit or 100% fruit juice counts as part of the Fruit Group. Fruits may be fresh, canned, frozen, or dried, and may be whole, cut-up, or pureed. Make half your plate fruits and vegetables.    Vegetables: Any vegetable or 100% vegetable juice counts as a member of the Vegetable Group. Vegetables may be fresh, frozen, canned, or dried. They can be served raw or cooked and may be whole, cut-up, or mashed. Make half your plate fruits and vegetables.     Grains: All foods made from grains are part of the Grains Group. These include wheat, rice, oats, cornmeal, and barley such as bread, pasta, oatmeal, cereal, tortillas, and grits. Grains should be no more than a quarter of your plate. At least half of your grains should be whole grains.    Protein: This group includes meat, poultry, seafood, beans and peas, eggs, processed soy products (like tofu), nuts (including nut butters), and seeds. Make protein choices no more than a quarter of your plate. Meat and poultry choices should be lean or low fat.    Dairy: All fluid milk products and foods made from milk that contain calcium, like yogurt and cheese are part of the Dairy Group. (Foods that have little calcium, such as cream, butter, and cream cheese, are not part of the group.) Most dairy choices should be low-fat or fat-free.    Oils: These are fats that are liquid at room temperature. They include canola, corn,  olive, soybean, and sunflower oil. Foods that are mainly oil include mayonnaise, certain salad dressings, and soft margarines. You should have only 5 to 7 teaspoons of oils a day. You probably already get this much from the food you eat.  Use [employer] to Help Build Your Meals  The SuperTracker can help you plan and track your meals and activity. You can look up individual foods to see or compare their nutritional value. You can get guidelines for what and how much you should eat. You can compare your food choices. And you can assess personal physical activities and see ways you can improve. Go to www.[employer] Energy[employer].gov/bSafecker/.    7194-8897 [employer]. [address] [place] [address], [place] [address]. All rights reserved. This information is not intended as a substitute for professional medical care. Always follow your healthcare professional's instructions.

## 2021-06-20 NOTE — PROGRESS NOTES
"Standard Diagnostic Assessment  Date(s): 2018  Start Time: 1:00pm  Stop Time: 1:55pm  Patient Name: Marley Weldon  Age: 43   1975      Referral Source: Dr. Eli  Therapist: Erin Kelley        Persons Present: Patient, therapist and patient's 16 month old daughter  Session Type: Patient is presenting for an individual session    Chief Complaint (in the patients words; reason patient believes they have been referred):   The patient's chief complaint at intake was in regards to \"stress of being a new mother.\" With some prompting, the patient disclosed that she experiences racing thoughts and worries. She becomes easily overwhelmed and develops negative cognitions.  The patient reported that she has been diagnosed with depression and ADHD in the past.  Patient s expectation for treatment (patient stated initial goal; i.e.:  I want to let go of my worries , Medication treatment if indicated):  The patient reported that she would like to develop more patience, \"more control,\" and have less worries. She agreed that she would like to try maintaining realistic expectations of self and increase positive self-talk.     Presenting Problem/History:  Issues/Stressors:   Presenting issues/stressors include:  -New mom (daughter is 16 months old)  -Recently moved to different apartment    Physical Problems: Incontinence of feces and Inability to sleep    Social Problems: No close friends    Behavioral Problems: none reported    Cognitive Problems: Distractability, Poor attention, Indecisiveness and Worries    Emotional Problems: Anxious, Nervous, Boredom and Excessive fears        Functional Impairments:   Personal: 3  Family: 2  Work: 2  Social: 2     How does the presenting problem affect patients daily functioning:    The patient is able to maintain part-time employment, as her SO watches the baby on the weekends. She is able to provide adequate care for her baby and denied any issues with her SO. There are " some minor social problems with no close friends and patterns of isolation. She described experiencing more significant impairments to personal functioning at this time.  Onset/Frequency/Duration presenting problem symptoms:    The patient noted that symptoms became more problematic approximately 6 months prior to intake. However, she reported being diagnosed with ADD and depression as early as .  How does the patient perceive his/her problem?  She perceives her problem congruently. She does have some difficulty articulating the extent of her problems or the impact in regards to functional impairments.     Family/Social History:   Current living situation (Household members, housing status, stability, multiple moves, potential eviction):  The patient recently moved into a new apartment () with her significant other and daughter. She reported that the apartment is bigger compared to their last place but she feels fairly disappointed. The amenities are inconvenient and the parking lot is far away. There are some cleanliness issues and mostly the patient just feels unsettled at this time.   Marriages/Significant other (including patients evaluation of the relationship quality):  The patient is not  but has been with her current parent for approximately 2 years. They have 1 child together.  She has never been  in the past but did have other significant personal relationships lasting 6 and 3 years.   Children (sex and ages, any significant issues):  The patient has a 16 month old daughter.   Parents (ages, living or , how many years ):   The patients parents currently live in [place]. She feels that they are far away so she does not see them often. She described them as being strict. She denied any significant issues in their relationship.  *There may be more to discuss in regards to patient's relationship with her parents - she did not provide much detail at intake.   Siblings (birth  order, ages, significant issues):   The patient has 1 younger brother. He lives in the area and is . Her sister-in-law recently told the patient that she couldn't help watch her daughter anymore and this hurt the patient's feelings. *there may be some undisclosed tension in this area.  Climate in family of origin (how does the patient perceive their childhood experience):  The patient recalls her parents being strict as she was growing up. She identified her parents as overprotective and private. She denied any significant issues or concerns related to her childhood experience.  Education (type and level of education):  The patient has an associates degree in office technology.  Problems with Learning or School (developmental issues, learning disabilities, behavioral concerns in school)  She reported some problems with learning and in school due to ADD. Behavioral interventions or resources to address concerns were not disclosed at intake.  Developmental factors (developmental milestones, head injuries, CVA s, etc. that may have impeded milestones):   None reported  Work History (current employment situation and any past employment history):  The patient worked at [employer] for 12 years. 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    Financial Concerns (basic status, housing, food, clothing are they on any assistance including SSI/SSDI):   No major financial concerns reported.     Significant life events (what does the patient identify as a personal life changing/influencing event):  Birth of her daughter    Sexual/physical/emotional/financial abuse/traumatic event. (any child protection involvement; who reported, Impact on patient/family/other):   The patient denied any sexual, physical, emotional, or financial abuse. She denied be exposed to any traumatic events at  "intake.  Based upon her hypervigilant presentation, the therapist may inquire more about her potential exposure to trauma during future sessions.  PTSD Symptoms:     [] Yes      [x] No; no specific PTSD symptoms noted at intake.    Contextual Non-personal factors contributing to the patients concerns (divorce in family, nation/natural disasters):  None reported    Significant personal relationships including patient s evaluation of the relationship quality (Co-worker s, neighbor s, AA groups, Voodoo peers, etc.):   The patient has a close personal relationship with her SO. She has few close friends. She is not a member of any support groups. She does not attend Voodoo.    Support network(s)/Resources (including strength and quality of social networks, who does the client consider supportive, other agencies or services patient uses):   The patient identified her parents and in-laws as members of her support network. Although, the patient noted feeling isolated often.     Belief system:    The patient noted that she was Pentecostalism but did not discuss much about her belief system at intake.     Cultural influences and impact on patient (ask about all aspects of culture and ask which are relevant to the patient. Go beyond nationality and ethnicity. Consider biases, life style, community style, i.e.: urban, poverty, abuse, etc). See page 5 Diagnostic Assessment, Clinical Training for descriptors):  The patient is a 43 year old  female. The patient's brief concentration and disorganized thought process made it difficult to inquire about aspects of culture because patient had difficulty engaging in any significant self-reflection throughout the intake process. She was very focused on the \"here and now\" without being able to focus on the impact of external factors. More work will be done in this area to help patient gain self-awareness.     Cultural impact on health and health care (how does patient s culture " "influence how the patient receives health care):   The patient was not able to comment about this. She does appear open to provider recommendations.     Legal Problems (DUI S, divorce, law suits, etc.):  The patient reported that she had \"1 DWI charge\" but this was reportedly dismissed.    Strengths/personal resources (what does the patient do well, what is going well in life, positive personality characteristics):  The patient reported the following strengths: cleaning, meal planning and writing.     Weaknesses (what does patient identify as a weakness):  The patient reported problems with procrastination, social anxiety and impatience.    Hobbies/Interests:    The patient is reportedly interested in music, food and swimming    Assessment of client needs (based on baseline measurements, symptoms, behaviors, skills, abilities, resources, vulnerabilities, safety needs):    The patient may benefit from additional community support to address social isolation     She would benefit from learning CBT techniques such as thought stopping and re-framing    She would benefit from learning about resources for new moms      Family Mental Health/Medical History    Family Mental Health:    No family history of mental health reported at intake    Family history of Suicide:  No family history of suicide reported at intake    Family history Chemical Dependency:    No reported family history of chemical dependency    Family Medical history:   No reported family medical problems      Patient Medical History    Hospitalizations (When/Where):     The patient was hospitalized when giving birth to her daughter in 2017    Medical diagnoses/concerns: (i.e.: Heart disease, thyroid problems,  Bld. Pressure,  seizures,  head Inj., Other)   Patient Active Problem List   Diagnosis     Cannabis abuse     Attention Deficit Disorder Without Hyperactivity     Nicotine Dependence     Anxiety      delivery delivered     Atypical squamous " cells cannot exclude high grade squamous intraepithelial lesion on cytologic smear of cervix (ASC-H)     *No significant medical problems disclosed at intake     Current physician/other non psychiatric medical provider s:    Unclear; patient reported that Dr. Eli is her PCP but per chart review their last office visit or actual encounter was back in 2011.                  Date of last medical exam:   8/8/17 with Dr. Mally Zavaleta    Current Medications:  Current Outpatient Prescriptions   Medication Sig Dispense Refill     calcium-vitamin D (CALCIUM-VITAMIN D) 500 mg(1,250mg) -200 unit per tablet Take 1 tablet by mouth 2 (two) times a day with meals.       cholecalciferol, vitamin D3, (VITAMIN D3) 1,000 unit capsule Take 1,000 Units by mouth daily.       multivitamin therapeutic (THERAGRAN) tablet Take 1 tablet by mouth daily.       norethindrone (MICRONOR) 0.35 mg tablet Take 1 tablet (0.35 mg total) by mouth daily. 84 tablet 4     sertraline (ZOLOFT) 50 MG tablet Take 1 tablet (50 mg total) by mouth daily. 50 tablet 6     No current facility-administered medications for this visit.        Past Mental Health History:    Previous mental health diagnosis & Date of Diagnosis:  The patient was reported diagnosed with depression in the past.    Hx of Mental Health Treatment or Services:  Per chart review, most recent office visit with Dr. Zavaleta:  Marley is a 42 y.o. female presenting to the clinic for concerns for ADD.  Patient states she was diagnosed with ADD in 1985.  She took Ritalin for 6 months in second grade.  Patient states the medication helps her be more social.  Patient took Adderall for 6 years prior to becoming pregnant 1 year ago.  She restarted the Adderall 1 month ago.  Her daughter is now 4 months old and does breast-feed.  Patient feels as though she is able to complete tasks in a timely manner.  It is also assisting her with suppressing her appetite.  Patient states she was thin prior to pregnancy  "and is having difficulty losing weight since her pregnancy.  Patient also has a history of anxiety.  She is weaning herself off of Zoloft by alternating 25 mg and 12.5 mg every other day.  She denies thoughts of suicide and thoughts of harming her child.  She has a history of marijuana use and states she stopped smoking this when she was pregnant.    The patient reported that she worked with a psychiatrist through [facility] but could not remember the date. She reportedly received some counseling in 2012.     IZABELLA Received:      [] Yes   [x] No      Hx of MH Tx/Hospitalizations (When/Where: must include a review of patient s record.  If not available, why, what if anything are you doing to obtain a record?):   None reported    Hx of Psychiatric Medications:  See above      Suicidal/Homicidal Risk Assessment:  Suicidal: None reported  Ideation:none reported  History of Past Attempt(s): description: No reported history of past suicide attempts  Crisis Plan: None required at intake     Homicidal: None reported   Ideation:none reported  History of Aggression towards others: No reported history of aggression towards others  Crisis Plan: No crisis plan required    History of destruction to property:  Description: No reported history of destruction to property  Crisis Plan: None required    Chemical Use/Abuse History  Alcohol:   [] None Reported    [x] Yes   [] No  Type: beer or wine   Frequency (daily, weekly, occasionally): daily  Age of first use: 22    Date of last use: Last night        Street Drugs:   [x] None Reported    [] Yes   [] No  Per chart review, patient has history of cannabis use.  Prescription Drugs:   [x] None Reported    [] Yes   [] No  Patient is prescribed adderall but denied any abuse.  Tobacco:   [] None Reported    [x] Yes   [] No  Type: cigarettes or e-cigs   Frequency (daily, weekly, occasionally): occasionally   Age of first use: 22    Date of last use: \"Sunday\" prior to " "intake  Caffeine:   [] None Reported    [x] Yes   [] No  Type: Coffee or tea   Frequency (daily, weekly, occasionally): weekly  Age of first use: unknown    Date of last use: \"less caffeine since restarting adderall\" date of last use not disclosed  Currently in a treatment program:   [] Yes   [x] No    IZABELLA Received:    [] Yes   [x] No       Collaborative info requested/received:   [] Yes   [x] No    History of CD Treatment:      [x] None Reported             CAGE-AID (screening to determine a patients use/abuse/dependency):      3/4    *more information must be collected in regards to patient's substance use history.   Non- Substance Abuse addictive Behaviors/Compulsive Behaviors:  [] Gambling     [] Sex     [] Pornography    [] Shopping     [] Eating     [] Self-Injury  [] Other           [x] None Reported    [] Hoarding    MENTAL STATUS EVALUATION  Grooming: Disheveled  Attire: Appropriate  Age: Appears Stated  Behavior Towards Examiner: Cooperative and Guarded/Evasive  Motor Activity: Excessive   Eye Contact: Avoidant  Mood: Anxious  Affect: Labile, Tearful and Anxious  Speech/Language: Rapid and Perseverative  Attention: Distractible and Hypervigilant  Concentration: Brief  Thought Process: Circumstantial and Flight of ideas  Thought Content: Hallucinations: none reported  Delusions: none reported  Orientation: X 3  Memory: Impairment, Recent and Remote  Judgement: Impairment and Minimal  Estimated Intelligence: below average to average  Demonstrated Insight: fair insight but not introspective  Fund of Knowledge: adequate      Clinical Impressions/Assessment/Recommendations:   Clinical summary: Cause, prognosis, likely consequences of symptoms. Strengths, Cultural influences, Life situations, relationships, health concern, and how Dx interacts or impacts with client s life.   The patient is a 43 year old  female. She was referred to engage in outpatient psychotherapy services to address symptoms of " "anxiety and depression. The patient presented for an initial intake session on 8/23/18 and returned for a second intake session on 9/11/2018. The patient was accompanied by her 16 month old daughter at both sessions. The patient was observed as positively interacting with her daughter and being very attentive to her needs. The patient's daughter is very calm and caused minimal distractions during the encounters. However, the presence of her daughter did cause some barriers in regards to engaging in any processing or discussion of distressing topics. The patient's brief concentration and disorganized thought process also made it difficult to inquire about aspects of her personal narrative because patient had difficulty engaging in any significant self-reflection throughout the intake process. She was very focused on the \"here and now\" without being able to focus on the impact of external factors. More work will be done in this area to help patient gain self-awareness. The therapist believes there have been many other significant life events that have not yet been disclosed.   One of the patient's concerns is her lack of help in taking care of her daughter. She identifies as a new mother with limited knowledge or resources about being a parent. She has been dating the father of her child for approximately 2 years. He works full-time and she works part-time on the weekends. They are able to meet their basic financial needs. They recently moved into a new apartment which was identified as a major source of stress. The patient is in the process of \"nesting.\" She often struggles to complete tasks and follow through with completing household chores. She values structure and functions better with a schedule and clear set of expectations. The patient does have immediate family in [place] but often feels isolated with few close friends. She has a history of unstable relationships with friends and has had to distance herself " from unhealthy peers. The patient's chief complaint at intake was in regards to stress associated with being a new mother. Her expectations for treatment were described as having an interest in obtaining more patience and more control over worries and anxious thoughts.   Prioritization of needed mental Health ancillary or other services.   The patient agreed to engage in outpatient psychotherapy services to address stress and history of depression.  How Diagnostic criteria is met: (Include symptoms, frequency, duration, functional impairments).  The patient completed the PHQ-9 questionnaire and scored a 9, indicating moderate depressive symptoms. The patient had a baby 16 months ago and may be experiencing some post-partum depressive symptoms. The patient has historically been treated for depression, per patient report, but is not currently taking any antidepressant medication. Depression is not her chief complaint at intake and the diagnosis will be considered due to her personal history.   Based upon patient's presentation, therapist observations and patient report, she meets criteria for ADHD. She has difficulty maintaining focus and has disorganized speech patterns. She is easily distracted by external or internal stimuli and often appeared restless. She sometimes had difficulty responding directly to therapist prompts and benefits from clear guidelines and structure. She is currently being prescribed adderall by a psychiatrist in the community. The therapist requested that patient sign an IZABELLA in order to obtain behavioral health records.    Explanation for any provisional diagnosis. Hypothesis why alternative diagnosis was considered and ruled out.  The patient completed the DARIELA-7 questionnaire and scored an 8, indicating moderate anxiety symptoms. The patient had a baby 16 months ago and was in the process of moving during her intake sessions. Thus, symptoms of anxiety may be closely associated with current  transitions and major life changes but should be monitored to better assess for Generalized Anxiety Disorder during future sessions.  Recommendations (treatment, referrals, services needed).  The patient is recommended to engage in outpatient psychotherapy services to address current stressors and adjustments. She has a psychiatrist in the community with her next scheduled visit for 10/16/18. She will follow-up with her PCP as needed. She may consider new moms groups or other support groups in the community.   Diagnosis (non-Axial as defined in DSM-5)  1. Adjustment disorder with mixed anxiety and depressed mood    2. Attention deficit hyperactivity disorder (ADHD), unspecified ADHD type    Major Depressive Disorder, Recurrent, Moderate  Provisional Diagnosis (list only- no explanation needed)  Generalized Anxiety Disorder  OCD    Sources/references used in completing this assessment:   -Face to face interview  -Patient Chart  -Adult Intake Questionnaire  -Measures completed: WHODAS, PHQ-9, DARIELA-7, C-SSRS, CAGE   WHODAS 2.0 12-item version: 10    Scores presented in qualifiers to represent level of disability.  MILD Problem (slight, low, ...) 5-24%  H1= 8  H2= 0  H3= 0     C-SSRS: Indicates low risk for suicide.  Suicidal Ideation= Lifetime= yes. Past 1 months= no  Suicidal Behavior: Lifetime= no. Past 3 months= no  Self Injurious Behavior: Lifetime= no. Past 3 months= no.  Interrupted Attempts by others: Lifetime= no. Past 3 months= no  Interrupted attempts by self: Lifetime= no. Past 3 months= no.  Preparatory Acts or Behavior: no      PHQ-9: 9 . Difficulty with daily functioning= somewhat difficult .              Indicates moderate severity.    DARIELA-7: 8 . Difficulty with daily functioning= somewhat difficult .               Indicates moderate severity.       Assessment of client resolving presenting mental health concerns:  Ability  [] low     [x] average     [] high  Motivation [] low     [x] average     []  high  Willingness [] low     [x] average     [] high    Initial Assessment Objectives (ex: Refer to psychiatry/psych testing, Return for follow up psychotherapy, Refer to, Obtain, Administer measures, etc.):  1. Patient and therapist will develop therapeutic relationship.  2. Patient to present for follow up appointment to initiate psychotherapy services.  3. Patient to continue to follow up with primary care physician as needed.  4. Develop comprehensive treatment plan.   5. Patient to follow up with psychiatry services in the community for medication management.   Is patient's family involved in the treatment?  [x] No     [x] Yes  If yes, How?  Patient brings her daughter with to session  If no, Why?  Family not directly involved in the treatment process    Therapist s Signature/Supervision/co-signature statement:   Performed and documented by LUCHO Agee    I have read, discussed and reviewed the documentation as presented by LUCHO Agee [facility]

## 2021-06-20 NOTE — PROGRESS NOTES
Patient failed to present for a follow-up psychotherapy visit on 10/3/2018 at 11am. Therapist called and left patient voice message at 11:20am on 10/3/18 to inquire about status and rescheduling. Therapist requested that patient call back to reschedule at her earliest convenience.

## 2021-06-20 NOTE — PROGRESS NOTES
Outpatient Mental Health Treatment Plan    Name:  Marley Weldon  :  1975  MRN:  [id number]    Treatment Plan:  Initial Treatment Plan  Intake/initial treatment plan date:  Intake date: 2018  Initial treatment plan date: 10/3/2018  Benefit and risks and alternatives have been discussed: Yes  Is this treatment appropriate with minimal intrusion/restrictions: Yes  Estimated duration of treatment:  Approximately 15 sessions.  Anticipated frequency of services:  Every 2-3 weeks  Necessity for frequency: This frequency is needed to establish therapeutic goals and for continuity of care in order to monitor progress.  Necessity for treatment: To address cognitive, behavioral, and/or emotional barriers in order to work toward goals and to improve quality of life.    Session Type: Patient is presenting for an Individual session.    Plan:           ?   ? Anxiety    Goal:  Decrease average anxiety level from 3 to 2.   Strategies: ? [x]Learn and practice relaxation techniques and other coping strategies (e.g., thought stopping, reframing, meditation)     ? [x] Increase involvement in meaningful activities     ? [x] Discuss sleep hygiene     ? [x] Explore thoughts and expectations about self and others     ? [x] Identify and monitor triggers for panic/anxiety symptoms     ? [x] Implement physical activity routine (with physician approval)     ? [x] Consider introduction of bibliotherapy and/or videos     ? [x] Continue compliance with medical treatment plan (or explore barriers)   ?Degree to which this is a problem: 3  Degree to which goal is met: 1  Date of Review: 2019         ? Depression    Goal:  Decrease average depression level from 2 to 1.   Strategies:    ?[x] Decrease social isolation     [x] Increase involvement in meaningful activities     ?[x] Discuss sleep hygiene     ?[x] Explore thoughts and expectations about self and others     ?[x] Process grief (loss of significant person, independence,  role, etc.)     ?[x] Assess for suicide risk     ?[x] Implement physical activity routine (with physician approval)     [x] Consider introduction of bibliotherapy and/or videos     [x] Continue compliance with medical treatment plan (or explore barriers) ?  Degree to which this is a problem: 2  Degree to which goal is met: 1  Date of Review: January 2019      Functional Impairment:  1=Not at all/Rarely  2=Some days  3=Most Days  4=Every Day    Personal : 3  Family : 2  Social : 3   Work/school : 2    Diagnosis (non-Axial as defined in DSM-5)  1. Adjustment disorder with mixed anxiety and depressed mood    2. Attention deficit hyperactivity disorder (ADHD), unspecified ADHD type    Major Depressive Disorder, Recurrent, Moderate  Provisional Diagnosis (list only- no explanation needed)  Generalized Anxiety Disorder  OCD    Clinical assessments and measures completed:. WHODAS; C-SSRS; Cage; PHQ-9; DARIELA-7    WHODAS 2.0 12-item version: 10    Scores presented in qualifiers to represent level of disability.  MILD Problem (slight, low, ...) 5-24%  H1= 8  H2= 0  H3= 0      C-SSRS: Indicates low risk for suicide.  Suicidal Ideation= Lifetime= yes. Past 1 months= no  Suicidal Behavior: Lifetime= no. Past 3 months= no  Self Injurious Behavior: Lifetime= no. Past 3 months= no.  Interrupted Attempts by others: Lifetime= no. Past 3 months= no  Interrupted attempts by self: Lifetime= no. Past 3 months= no.  Preparatory Acts or Behavior: no        PHQ-9: 9 . Difficulty with daily functioning= somewhat difficult .              Indicates moderate severity for depression.     DARIELA-7: 8 . Difficulty with daily functioning= somewhat difficult .               Indicates moderate severity for anxiety.        Strengths:  Patient has a pleasant demeanor with therapist. She is very attentive and caring toward her baby.   Limitations:  Patient is easily distracted and has difficulty staying on task.  Cultural Considerations: Patient is a 43 year  old  female. She is a new mother.    Persons responsible for this plan: Patient and Provider            Psychotherapist Signature           Patient Signature:              Guardian Signature             Provider: Performed and documented by LUCHO Agee   Date:  10/3/2018

## 2021-06-20 NOTE — PROGRESS NOTES
"Mental Health Visit Note    This is a dual signature report. My supervising clinician is MEÑO Degroot.    8/23/2018    Start time: 1:10pm    Stop Time: 2:05pm   Session # Intake Session 1    Session Type: Patient is presenting for an Individual session.    Marley Weldon is a 43 y.o. female is being seen today for    Chief Complaint   Patient presents with      Diagnostic Assessment     Intake Session 1   .     New symptoms or complaints: Patient referred for psychotherapy services due to symptoms of anxiety    Functional Impairment:   Personal: 4  Family: 4  Work: 2  Social:4    Clinical assessment of mental status:   Grooming: Well groomed  Attire: Appropriate  Age: Appears Stated  Behavior Towards Examiner: Cooperative  Motor Activity: Within normal   Eye Contact: Appropriate  Mood: Anxious  Affect: Congruent w/content of speech, Labile and Anxious  Speech/Language: Within normal and Rapid  Attention: Distractible  Concentration: Brief  Thought Process: other  Thought Content: Hallucinations: none reported  Delusions: none reported  Orientation: X 3  Memory: No Evidence of Impairment  Judgement: No Evidence of Impairment  Estimated Intelligence: Average  Demonstrated Insight: Adequate  Fund of Knowledge: adequate    Suicidal/Homicidal Ideation present: None Reported This Session    Patient's impression of their current status:   The patient described reasons for engaging in therapy services during today's session. She stated, \"I'm going through a very stressful time.\"   The patient believes that the symptoms began approximately 6 months ago.   The patient has briefly engaged in psychotherapy services before.   She described expectations for treatment when she stated, \"I needed someone to talk to about everything.\"    Therapist impression of patients current state:   The patient presented for an initial intake session with this provider. She was accompanied by her 16 month old daughter. Patient is a " 43 year old female. Patient was referred by Dr. Eli to engage in individual psychotherapy to address symptoms of anxiety. The patient is also a new mother and is stressed due to plans to move on 9/8/18. She is feeling very unsettled and anxious at this time. The patient appeared cooperative and open-minded throughout the intake and easily engaged in dialogue. The patient exhibited clear signs and symptoms of ADD but was receptive to guidance from the therapist. Therapist and patient reviewed consent and privacy policy. Patient reported understanding and signed document- sent to be scanned into EMR. There is no diagnostic assessment on file or available within the past 3 years. The patient completed the following questionnaires for session today: WHODAS, CAGE, PHQ-9 and DARIELA-7. Therapist and patient completed C-SSRS together in session. These questionnaires will be used to inform diagnoses and will be uploaded to patient chart after standard DA is completed. The patient denied any suicidal or homicidal ideation at intake. Therapist and patient will further review patient's history during the next follow-up encounter in order to complete a standard diagnostic assessment. The therapist discussed the adult intake questionnaire packet with the patient. Patient and therapist will review questions and responses during the next follow-up encounter in order to complete a standard diagnostic assessment. Goals for treatment will be established after the 2nd or 3rd follow-up session with this provider. The patient explained that she really benefits from structure and clearly assigned tasks. The patient did not request psychiatry services at intake. She may consider psychotropic medication in the future.       Type of psychotherapeutic technique provided: Client centered and CBT    Progress toward short term goals:Progress as expected, as patient appeared open-minded and cooperative throughout this intake appointment. She  appeared willing and motivated to engage in psychotherapy services.    Review of long term goals: Not done at today's visit    Diagnosis:   1. Attention Deficit Disorder Without Hyperactivity    2. Adjustment disorder with mixed anxiety and depressed mood        Plan and Follow up: Patient is scheduled to return for intake session # 2 on 9/11/2018.  A Standard Diagnostic Assessment will be completed after patient's second session with this provider.       Discharge Criteria/Planning: Client has chronic symptoms and ongoing therapy for maintenance stability recommended.     Performed and documented by LUCHO Agee    I have read, discussed and reviewed the documentation as presented by LUCHO Agee [facility]    Erin Kelley 8/23/2018

## 2021-06-25 NOTE — PROGRESS NOTES
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  Pertinent past history includes her report of an abnormal Pap in 2014 or 2015 through [facility]; that result is not available.  She had a normal Pap in 2011.    Procedure Details   /66 (Patient Site: Left Arm, Patient Position: Sitting, Cuff Size: Adult Regular)  Pulse 86  Ht 5' 3.5\" (1.613 m)  Wt 163 lb (73.9 kg)  SpO2 98%  BMI 28.42 kg/m2  Body mass index is 28.42 kg/(m^2).    The reason for procedure is explained, and informed consent is obtained.  Urine hCG is negative.    Speculum is placed in the vagina and visualization of cervix is achieved. The cervix is swabbed with 3% acetic acid solution. Transformation zone is easily seen.  Green filter is applied with no abnormal vessels seen.  Acetowhite epithelium is seen at 7:00.  Biopsy is taken.  Monsels applied with good hemostasis noted.  Adequate colposcopy.  The patient tolerated the procedure well.      Impression: JOI I at worst    Plan: Specimen labelled and sent to Pathology. Post procedure instructions are reviewed.  The role of HPV in causing cervical pap smear abnormalities, dysplasia, and cancer is reviewed with the patient. She will be contacted with biopsy results and recommendations.  Release of information for [facility] was signed to get her most recent Pap result.      Addendum:  Pap smear received from [facility].  11/4/14 ASCUS with negative HPV       "

## 2021-06-25 NOTE — TELEPHONE ENCOUNTER
Recvd call sheldon from pt/Marley, went over her COVID vaccine appt, let her know that we only have the Moderna and it is noted that she wanted the pfizer.   She does not want the Moderna and since this it the only one we have she would like to cancel her appointment.    I have cancelled this appt for lemuel/Marley

## 2021-06-25 NOTE — TELEPHONE ENCOUNTER
Left message to return call. Patient made an appt for covid vaccine stating she wants the pfizer - we have the moderna. Dose she want the appt still?

## 2021-06-27 ENCOUNTER — HEALTH MAINTENANCE LETTER (OUTPATIENT)
Age: 46
End: 2021-06-27

## 2021-06-29 NOTE — PROGRESS NOTES
Progress Notes by Thony Sykes DO at 11/3/2020  2:40 PM     Author: Thony Sykes DO Service: -- Author Type: Physician    Filed: 11/3/2020  4:04 PM Encounter Date: 11/3/2020 Status: Signed    : Thony Sykes DO (Physician)       FEMALE PREVENTATIVE EXAM    Assessment and Plan:     Patient has been advised of split billing requirements and indicates understanding: Yes    1. Encounter for general adult medical examination without abnormal findings  I recommended she work on getting regular exercise and eating healthy foods.  We are going to check labs as listed below.  Mammogram was ordered since she has not had this yet.    2. Restless leg syndrome  She reports that her iron supplement and eating foods rich in iron have been helpful for restless leg symptoms.  I recommended that we check a hemogram, ferritin and BMP today.  She will be notified of the results when available.  - HM2(CBC w/o Differential)  - Ferritin  - Basic Metabolic Panel    3. Atypical squamous cells cannot exclude high grade squamous intraepithelial lesion on cytologic smear of cervix (ASC-H)  We are checking a Pap smear today.  She underwent a colposcopy procedure in 2017 and biopsy results showed no evidence of high-grade dysplasia or carcinoma.    4. Encounter for screening for cervical cancer   - Gynecologic Cytology (PAP Smear)    5. Anxiety  Anxiety symptoms have been stable on sertraline 50 mg daily.  She will continue to follow with her prescribing mental health provider.    6. Attention deficit disorder, unspecified hyperactivity presence  This issue has been stable on Adderall XR 30 mg daily.  She will continue to follow closely with her prescribing mental health provider.    7. Hearing loss of right ear, unspecified hearing loss type  She describes having hearing loss on the right side.  I recommended she see audiology for further evaluation.  - Ambulatory referral to Audiology      Next follow up:  No follow-ups on file.    Immunization Review  Adult Imm Review: Declines immunizations today      I discussed the following with the patient:   Adult Healthy Living: Importance of regular exercise  Healthy nutrition        Subjective:   Chief Complaint: Marley Weldon is an 45 y.o. female here for a preventative health visit.    Patient has been advised of split billing requirements and indicates understanding: Yes  HPI: She denies concerns regarding hearing, vision, urination, bowel movements, sleep or mood.  She has a history significant for anxiety, ADHD and a history of an abnormal Pap smear 2017.  Her Pap smear showed ASCUS with positive HPV.  She underwent a colposcopy and the biopsy results showed no evidence of high-grade dysplasia or carcinoma.  She is due to have another Pap smear today.  She is concerned about restless leg symptoms she has been experiencing over the past few weeks.  She reports that taking her iron supplement or eating foods high in iron seem to help with this.  She would like to have iron levels checked as part of today's visit.  She is not feeling ill or having fevers.      Healthy Habits  Are you taking a daily aspirin? No  Do you typically exercising at least 40 min, 3-4 times per week?  NO  Do you usually eat at least 4 servings of fruit and vegetables a day, include whole grains and fiber and avoid regularly eating high fat foods? NO  Have you had an eye exam in the past two years? NO  Do you see a dentist twice per year? Once a year.  Do you have any concerns regarding sleep? No    Safety Screen  If you own firearms, are they secured in a locked gun cabinet or with trigger locks? The patient does not own any firearms  Do you feel you are safe where you are living?: Yes (11/3/2020  2:52 PM)  Do you feel you are safe in your relationship(s)?: Yes (11/3/2020  2:52 PM)      Review of Systems:  Please see above.  The rest of the review of systems are negative for  "all systems.       Cancer Screening       Status Date      PAP SMEAR Next Due 5/22/2022      Done 5/22/2017 GYNECOLOGIC CYTOLOGY (PAP SMEAR)     Patient has more history with this topic...              History     Reviewed By Date/Time Sections Reviewed    Annemarie Birmingham CMA 11/3/2020  2:52 PM Tobacco            Objective:   Vital Signs:   Visit Vitals   5' 3.39\" (1.61 m)   Wt 152 lb 1.6 oz (69 kg)   LMP 10/23/2020 (Exact Date)   Breastfeeding Unknown   BMI 26.62 kg/m           PHYSICAL EXAM  General Appearance: Alert, cooperative, no distress, appears stated age  Head: Normocephalic, without obvious abnormality, atraumatic  Eyes: PERRL, conjunctiva/corneas clear, EOM's intact  Ears: Normal TM's and external ear canals, both ears  Nose: Nares normal, septum midline,mucosa normal, no drainage  Throat: Lips, mucosa, and tongue normal; teeth and gums normal  Neck: Supple, symmetrical, trachea midline, no adenopathy;  thyroid: not enlarged, symmetric, no tenderness/mass/nodules  Back: Symmetric, no curvature, ROM normal, no CVA tenderness  Lungs: Clear to auscultation bilaterally, respirations unlabored  Breasts: No breast masses, tenderness, asymmetry, or nipple discharge.  Heart: Regular rate and rhythm, S1 and S2 normal, no murmur, rub, or gallop  Abdomen: Soft, non-tender, bowel sounds active all four quadrants,  no masses, no organomegaly  Pelvic: Normal-appearing female external genitalia.  Normal appearing vagina and cervix.  No adnexal masses or cervical motion tenderness on bimanual exam.  Extremities: Extremities normal, atraumatic, no cyanosis or edema  Skin: Skin color, texture, turgor normal, no rashes or lesions  Lymph nodes: Cervical, supraclavicular, and axillary nodes normal  Neurologic: Alert.  Normal speech.  No focal deficits.  Deep tendon reflexes normal bilaterally  Psych: Appears mildly anxious.  Does not appear depressed.  DARIELA-7 score 6.  PHQ-9 score is 4.               Medication List       "    Accurate as of November 3, 2020  4:02 PM. If you have any questions, ask your nurse or doctor.            CONTINUE taking these medications    calcium-vitamin D 500 mg(1,250mg) -200 unit per tablet  INSTRUCTIONS: Take 1 tablet by mouth 2 (two) times a day with meals.  Generic drug: calcium-vitamin D        dextroamphetamine-amphetamine 30 MG 24 hr capsule  Also known as: ADDERALL XR  INSTRUCTIONS: 30 mg daily.        MAGNESIUM CITRATE ORAL  INSTRUCTIONS: Take by mouth as needed.        multivitamin therapeutic tablet  Also known as: THERAGRAN  INSTRUCTIONS: Take 1 tablet by mouth daily.        sertraline 50 MG tablet  Also known as: Zoloft  INSTRUCTIONS: Take 1 tablet (50 mg total) by mouth daily.        Vitamin D3 25 mcg (1,000 unit) capsule  INSTRUCTIONS: Take 2,000 Units by mouth daily.  Generic drug: cholecalciferol (vitamin D3)           STOP taking these medications    FERROUS SULFATE ORAL  Stopped by: Thony Moscoso DO     norethindrone 0.35 mg tablet  Also known as: MICRONOR  Stopped by: Thony Moscoso DO            Additional Screenings Completed Today:     The patient was counseled and encouraged to consider modifying their diet and eating habits. She was provided with information on recommended healthy diet options.
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
## 2021-07-14 PROBLEM — Z34.90 PREGNANT: Status: RESOLVED | Noted: 2017-04-03 | Resolved: 2017-07-12

## 2021-07-14 PROBLEM — Z37.9 NORMAL LABOR: Status: RESOLVED | Noted: 2017-04-03 | Resolved: 2017-07-12

## 2021-07-14 PROBLEM — Z34.90 PREGNANCY: Status: RESOLVED | Noted: 2017-04-06 | Resolved: 2017-07-12

## 2021-10-17 ENCOUNTER — HEALTH MAINTENANCE LETTER (OUTPATIENT)
Age: 46
End: 2021-10-17

## 2021-12-12 ENCOUNTER — HEALTH MAINTENANCE LETTER (OUTPATIENT)
Age: 46
End: 2021-12-12

## 2021-12-20 ENCOUNTER — OFFICE VISIT (OUTPATIENT)
Dept: INTERNAL MEDICINE | Facility: CLINIC | Age: 46
End: 2021-12-20
Payer: COMMERCIAL

## 2021-12-20 VITALS
HEART RATE: 87 BPM | OXYGEN SATURATION: 100 % | SYSTOLIC BLOOD PRESSURE: 114 MMHG | WEIGHT: 158.38 LBS | DIASTOLIC BLOOD PRESSURE: 74 MMHG | BODY MASS INDEX: 27.71 KG/M2

## 2021-12-20 DIAGNOSIS — R10.2 PELVIC PAIN IN FEMALE: Primary | ICD-10-CM

## 2021-12-20 PROCEDURE — 99214 OFFICE O/P EST MOD 30 MIN: CPT | Performed by: NURSE PRACTITIONER

## 2021-12-20 NOTE — PROGRESS NOTES
Internal Medicine Office Visit  [facility]   Patient Name: Marley Weldon  Patient Age: 46 year old  YOB: 1975  MRN: [id number]     Date of Visit: 12/20/2021  Patient presents with:  Abdominal Pain: Pt states she feels like she is experiencing premenopausal symptoms, having cramping and fullness around bladder and feels like it is cramping a lot, and it seems like sometimes I get hot flashes, started in October           Assessment / Plan / Medical Decision Making:    Problem List Items Addressed This Visit     None      Visit Diagnoses     Pelvic pain in female    -  Primary    Relevant Orders    US Pelvic Complete with Transvaginal         - Pt declines UPT and STI screening, symptoms have been ongoing for the past 6 months and she does not have STI risks   - Vaginal/pelvic physical exam is normal. Will proceed with transvaginal ultrasound  - If normal ultrasound, referral to pelvic floor PT       I am having Marley Weldon maintain her multivitamin therapeutic (THERAGRAN) tablet, calcium carbonate-vitamin D, cholecalciferol, sertraline, amphetamine-dextroamphetamine, and MAGNESIUM CITRATE ORAL.          Orders Placed This Encounter   Procedures     US Pelvic Complete with Transvaginal   Followup: Return in about 6 weeks (around 1/31/2022), or if symptoms worsen or fail to improve, for Follow up. earlier if needed.        Julia Mirza, DILLON, CNP        HPI:  Marley Weldon is a 46 year old year old who presents to the office today for bilateral pelvic pain that started in the past 6 months. It is worse after she has to urinate a lot and she feels a pressure in the pelvic area. A couple of weeks ago before her period she felt bad cramping pain in the bilateral pelvic area but worse on the left side, she normally doesn't get cramps until the first day of her period. She is sexually active and not using contraception. LMP 12/8/21, periods are regular and her most recent period was  usual/normal for her. Menarche age 16. H/o  section. No dysuria, hematuria. No concerns for STI and no change in sexual partners.      She has been having hot flashes lately, recalls that the first one happened on Halloween and then 2 weeks after. She recently weaned down her dose of sertraline.  No hot flashes since this time.     Bilateral hand numbness and tingling. H/o carpal tunnel when she was pregnant.     Health Maintenance Review  Health Maintenance   Topic Date Due     ANNUAL REVIEW OF HM ORDERS  Never done     ADVANCE CARE PLANNING  Never done     HEPATITIS C SCREENING  Never done     PREVENTIVE CARE VISIT  2021     COVID-19 Vaccine (3 - Booster for Moderna series) 2022     PAP FOLLOW-UP  2023     HPV FOLLOW-UP  2023     DTAP/TDAP/TD IMMUNIZATION (8 - Td or Tdap) 2027     HIV SCREENING  Completed     PHQ-2  Completed     IPV IMMUNIZATION  Completed     INFLUENZA VACCINE  Addressed     Pneumococcal Vaccine: Pediatrics (0 to 5 Years) and At-Risk Patients (6 to 64 Years)  Aged Out     MENINGITIS IMMUNIZATION  Aged Out     HEPATITIS B IMMUNIZATION  Aged Out       Current Scheduled Meds:  Outpatient Encounter Medications as of 2021   Medication Sig Dispense Refill     dextroamphetamine-amphetamine (ADDERALL XR) 30 MG 24 hr capsule [DEXTROAMPHETAMINE-AMPHETAMINE (ADDERALL XR) 30 MG 24 HR CAPSULE] 30 mg daily.        MAGNESIUM CITRATE ORAL [MAGNESIUM CITRATE ORAL] Take by mouth as needed.       multivitamin therapeutic (THERAGRAN) tablet [MULTIVITAMIN THERAPEUTIC (THERAGRAN) TABLET] Take 1 tablet by mouth daily.       sertraline (ZOLOFT) 50 MG tablet [SERTRALINE (ZOLOFT) 50 MG TABLET] Take 1 tablet (50 mg total) by mouth daily. 50 tablet 6     calcium-vitamin D (CALCIUM-VITAMIN D) 500 mg(1,250mg) -200 unit per tablet [CALCIUM-VITAMIN D (CALCIUM-VITAMIN D) 500 MG(1,250MG) -200 UNIT PER TABLET] Take 1 tablet by mouth 2 (two) times a day with meals. (Patient not taking:  Reported on 12/20/2021)       cholecalciferol, vitamin D3, (VITAMIN D3) 1,000 unit capsule [CHOLECALCIFEROL, VITAMIN D3, (VITAMIN D3) 1,000 UNIT CAPSULE] Take 2,000 Units by mouth daily.  (Patient not taking: Reported on 12/20/2021)       No facility-administered encounter medications on file as of 12/20/2021.         Objective / Physical Examination:  Vitals:    12/20/21 0926   BP: 114/74   BP Location: Right arm   Patient Position: Sitting   Cuff Size: Adult Regular   Pulse: 87   SpO2: 100%   Weight: 71.8 kg (158 lb 6 oz)     Wt Readings from Last 3 Encounters:   12/20/21 71.8 kg (158 lb 6 oz)   11/03/20 69 kg (152 lb 1.6 oz)   12/18/19 66.5 kg (146 lb 9 oz)     Body mass index is 27.71 kg/m .     Constitutional: In no apparent distress  GI: bowel sounds active x 4 quadrants. No tenderness to palpation. No rebound tenderness or guarding   Genital: EXTERNAL GENITALIA: Normal appearing vulva without masses, tenderness or lesions. PERINEUM: normal and intact. URETHRAL MEATUS: normal VAGINA:  vagina with normal color and without discharge or lesions. No prolapse. CERVIX: normal appearing cervix without discharge or lesions. Non-friable. No CMT. UTERUS: uterus is normal size, shape, consistency, and non-tender. ADNEXA: no tenderness or fullness

## 2022-01-12 ENCOUNTER — ANCILLARY PROCEDURE (OUTPATIENT)
Dept: ULTRASOUND IMAGING | Facility: CLINIC | Age: 47
End: 2022-01-12
Attending: NURSE PRACTITIONER
Payer: COMMERCIAL

## 2022-01-12 DIAGNOSIS — R10.2 PELVIC PAIN IN FEMALE: ICD-10-CM

## 2022-01-12 PROCEDURE — 76830 TRANSVAGINAL US NON-OB: CPT | Performed by: RADIOLOGY

## 2022-01-12 PROCEDURE — 76856 US EXAM PELVIC COMPLETE: CPT | Performed by: RADIOLOGY

## 2022-01-13 DIAGNOSIS — R10.2 PELVIC PAIN IN FEMALE: Primary | ICD-10-CM

## 2022-02-23 ENCOUNTER — TELEPHONE (OUTPATIENT)
Dept: MIDWIFE SERVICES | Facility: CLINIC | Age: 47
End: 2022-02-23
Payer: COMMERCIAL

## 2022-02-23 NOTE — TELEPHONE ENCOUNTER
Pt just called to make her iob appt for 3/21. Writer noticed there is a mammogram appt for 2/25. Is it safe for newly pregnant pt to have the mammo? Pls call to discuss with pt if this should be postponed. Pt saw us in the past for pregnancy.

## 2022-02-24 NOTE — TELEPHONE ENCOUNTER
12 noon:    Patient reached by phone.  Discussed that the least amount of radiation exposure during pregnancy is best.  If mammography that is scheduled is routine screening, may consider postponing until postpartum.  Paternal grandmother with breast cancer; no first-degree relatives with breast cancer.  No personal history of abnormal mammography.

## 2022-03-21 ENCOUNTER — PRENATAL OFFICE VISIT (OUTPATIENT)
Dept: MIDWIFE SERVICES | Facility: CLINIC | Age: 47
End: 2022-03-21
Payer: COMMERCIAL

## 2022-03-21 ENCOUNTER — MEDICAL CORRESPONDENCE (OUTPATIENT)
Dept: HEALTH INFORMATION MANAGEMENT | Facility: CLINIC | Age: 47
End: 2022-03-21

## 2022-03-21 VITALS
WEIGHT: 156 LBS | HEIGHT: 64 IN | SYSTOLIC BLOOD PRESSURE: 112 MMHG | BODY MASS INDEX: 26.63 KG/M2 | HEART RATE: 68 BPM | DIASTOLIC BLOOD PRESSURE: 68 MMHG

## 2022-03-21 DIAGNOSIS — F41.9 ANXIETY: ICD-10-CM

## 2022-03-21 DIAGNOSIS — O09.529 SUPERVISION OF HIGH-RISK PREGNANCY OF ELDERLY MULTIGRAVIDA: Primary | ICD-10-CM

## 2022-03-21 DIAGNOSIS — F98.8 ATTENTION DEFICIT DISORDER WITHOUT HYPERACTIVITY: ICD-10-CM

## 2022-03-21 DIAGNOSIS — O09.529 ANTEPARTUM MULTIGRAVIDA OF ADVANCED MATERNAL AGE: ICD-10-CM

## 2022-03-21 DIAGNOSIS — O34.219 PREVIOUS CESAREAN DELIVERY, ANTEPARTUM CONDITION OR COMPLICATION: ICD-10-CM

## 2022-03-21 PROBLEM — O09.90 PREGNANCY, HIGH-RISK: Status: RESOLVED | Noted: 2022-03-21 | Resolved: 2022-03-21

## 2022-03-21 PROBLEM — O09.90 PREGNANCY, HIGH-RISK: Status: ACTIVE | Noted: 2022-03-21

## 2022-03-21 LAB
ABO/RH(D): NORMAL
ANTIBODY SCREEN: NEGATIVE
ERYTHROCYTE [DISTWIDTH] IN BLOOD BY AUTOMATED COUNT: 12.7 % (ref 10–15)
HBA1C MFR BLD: 5 % (ref 0–5.6)
HCT VFR BLD AUTO: 37.6 % (ref 35–47)
HGB BLD-MCNC: 12.6 G/DL (ref 11.7–15.7)
MCH RBC QN AUTO: 30.1 PG (ref 26.5–33)
MCHC RBC AUTO-ENTMCNC: 33.5 G/DL (ref 31.5–36.5)
MCV RBC AUTO: 90 FL (ref 78–100)
PLATELET # BLD AUTO: 332 10E3/UL (ref 150–450)
RBC # BLD AUTO: 4.19 10E6/UL (ref 3.8–5.2)
SPECIMEN EXPIRATION DATE: NORMAL
SPECIMEN EXPIRATION DATE: NORMAL
WBC # BLD AUTO: 9 10E3/UL (ref 4–11)

## 2022-03-21 PROCEDURE — 99207 PR FIRST OB VISIT: CPT | Performed by: ADVANCED PRACTICE MIDWIFE

## 2022-03-21 PROCEDURE — 87340 HEPATITIS B SURFACE AG IA: CPT | Performed by: ADVANCED PRACTICE MIDWIFE

## 2022-03-21 PROCEDURE — 87086 URINE CULTURE/COLONY COUNT: CPT | Performed by: ADVANCED PRACTICE MIDWIFE

## 2022-03-21 PROCEDURE — 86780 TREPONEMA PALLIDUM: CPT | Performed by: ADVANCED PRACTICE MIDWIFE

## 2022-03-21 PROCEDURE — 87591 N.GONORRHOEAE DNA AMP PROB: CPT | Performed by: ADVANCED PRACTICE MIDWIFE

## 2022-03-21 PROCEDURE — 86762 RUBELLA ANTIBODY: CPT | Performed by: ADVANCED PRACTICE MIDWIFE

## 2022-03-21 PROCEDURE — 87491 CHLMYD TRACH DNA AMP PROBE: CPT | Performed by: ADVANCED PRACTICE MIDWIFE

## 2022-03-21 PROCEDURE — 86901 BLOOD TYPING SEROLOGIC RH(D): CPT | Performed by: ADVANCED PRACTICE MIDWIFE

## 2022-03-21 PROCEDURE — 36415 COLL VENOUS BLD VENIPUNCTURE: CPT | Performed by: ADVANCED PRACTICE MIDWIFE

## 2022-03-21 PROCEDURE — 86850 RBC ANTIBODY SCREEN: CPT | Performed by: ADVANCED PRACTICE MIDWIFE

## 2022-03-21 PROCEDURE — 85027 COMPLETE CBC AUTOMATED: CPT | Performed by: ADVANCED PRACTICE MIDWIFE

## 2022-03-21 PROCEDURE — 87389 HIV-1 AG W/HIV-1&-2 AB AG IA: CPT | Performed by: ADVANCED PRACTICE MIDWIFE

## 2022-03-21 PROCEDURE — 86803 HEPATITIS C AB TEST: CPT | Performed by: ADVANCED PRACTICE MIDWIFE

## 2022-03-21 PROCEDURE — 86900 BLOOD TYPING SEROLOGIC ABO: CPT | Performed by: ADVANCED PRACTICE MIDWIFE

## 2022-03-21 PROCEDURE — 99205 OFFICE O/P NEW HI 60 MIN: CPT | Performed by: ADVANCED PRACTICE MIDWIFE

## 2022-03-21 PROCEDURE — 83036 HEMOGLOBIN GLYCOSYLATED A1C: CPT | Performed by: ADVANCED PRACTICE MIDWIFE

## 2022-03-21 RX ORDER — PYRIDOXINE HCL (VITAMIN B6) 25 MG
25 TABLET ORAL 4 TIMES DAILY PRN
Qty: 40 TABLET | Refills: 0 | Status: SHIPPED | OUTPATIENT
Start: 2022-03-21 | End: 2022-04-18

## 2022-03-21 RX ORDER — FERROUS GLUCONATE 324(38)MG
324 TABLET ORAL
COMMUNITY
End: 2022-07-20

## 2022-03-21 RX ORDER — FOLIC ACID 20 MG
CAPSULE ORAL
COMMUNITY
End: 2022-05-25
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
## 2022-03-21 NOTE — LETTER
March 21, 2022    To Whom It May Concern:    Marley Weldon is being seen in our clinic for prenatal care.      Her Estimated Date of Delivery: Oct 11, 2022.       Patient's last menstrual period was 01/04/2022 (exact date).     Sincerely,        TAY Paul, FLORY, IBCLC  [facility]  Midwifery

## 2022-03-21 NOTE — PATIENT INSTRUCTIONS
"Call [phone] to schedule ultrasound.         Welcome to [facility]   and thank you for choosing us for your maternity care provider!  Congratulations!    Meet the Midwives from [facility]  You are invited to an informational meet and greet with [facility] Certified Nurse-Midwives. Our free \"Meet the Midwives\" event is a great opportunity to learn about our midwives' philosophy and experience, the hospitals where we can assist with your birth, and answer questions you may have. Partners, friends, and family are welcome to attend. Currently, this is a virtual event.  Date  First Tuesday of every month at 7 pm.    Link to next (live) meeting  [email or web address]  To Join by Telephone (audio only) Call:   [phone] Phone Conference ID: [phone]#    Contact information:  Appointment line and to get a hold of CNM in clinic Monday-Friday 8 am - 5 pm:  [phone].  There are some clinics with early start times (1st appointment 7:40 am) and others with evening hours (last appointment 6:20 pm).  Most are typically open from 8 am to 5 pm.    CNM on call answering service: [phone].  Specify your hospital of choice and leave a brief message for CNM;  will then page CNM who is on call at your specified hospital and you should receive a call back with 15 minutes.  Be sure that your ringer is audible and that you can accept blocked calls so that we can get back in touch with you! This number should be reserved for urgent needs if during the day, before 8 am, after 5 pm, weekends, holidays.      Pregnancy: Body Changes  From conception (fertilization) until after the birth of your child, you and your baby will change every day. To help you understand what is happening, we ve outlined how pregnancy begins and some of the changes you may notice.  How Pregnancy " Begins  Conception is the union of a sperm and an egg. When it occurs, your baby s genetic makeup is complete, even its sex. Fertilization takes place in the fallopian tube. The fertilized egg then travels down this tube to the uterus (womb). The egg attaches to the lining of the uterus about a week later. There it grows and is nourished.    Your Changing Body  Pregnancy affects almost every part of your body. You may notice some of the following physical and emotional changes:    Your uterus expands outward and upward as your baby grows. You may feel pressure on your bladder, stomach, and other organs.    You may notice skin color changes on your forehead, nose, and cheeks. A dark line may form from your bellybutton down to your pubic area. The skin color around your nipples and thighs may also change.    Pink stretch marks may appear on your abdomen, breasts, or hips.    Your hair may seem thicker. You lose less hair during pregnancy.    You may feel fine one day and weepy the next. This is caused by changes in your body, such as increased hormones (chemicals that affect the function of certain organs and also your moods).      Adapting to Pregnancy: First Trimester  As your body adjusts, you may have to change or limit your daily activities. You ll need more rest. You may also need to use the energy you have more wisely.  Eat stomach-friendly foods like cottage cheese, crackers, or bread throughout the day.    Your Changing Body  Almost every part of your body is affected as you adapt to pregnancy. The uterus and cervix will begin to soften right away. You may not look very pregnant during the first three months. But you are likely to have some common signs of early pregnancy:    Nausea    Fatigue    Frequent urination    Mood swings    Bloating of the abdomen    Missed or light periods (first trimester bleeding)    Nipple or breast tenderness, breast swelling      It s Not Too Late to Start Good Habits  What  matters most is protecting your baby from this moment on. If you smoke, drink alcohol, or use drugs, now is the time to stop. If you need help, talk with your health care provider.    Smoking increases the risk of stillbirth or having a low-birth-weight baby. If you smoke, quit now.    Alcohol and drugs have been linked with miscarriage, birth defects, intellectual disability, and low birth weight. Do not drink alcohol or take drugs.    Tips to Relieve Nausea  Although nausea can occur at any time of the day, it may be worse in the morning. To help prevent nausea:    Eat small, light meals at frequent intervals.    Get up slowly. Eat a few unsalted crackers before you get out of bed.    Drink water with lemon slices.    Eat an ice pop in your favorite flavor.    Ask your health care provider about taking roosevelt or vitamin B6 for nausea and vomiting.    Talk with your health care provider if you take vitamins that upset your stomach.    Work Concerns  The end of the first trimester is a good time to discuss working during pregnancy with your employer. Follow your health care provider s advice if your job requires you to stand for a long time, work with hazardous tools, or even sit at a desk all day. Your workspace, workload, or scheduled hours may need to be adjusted. Perhaps you can change body postures more often or take an extra break.  Advice for Travel  Talk to your health care provider first, but the second trimester may be the best time for any travel. You may be advised to avoid certain trips while you re pregnant. Food and water can be concerns in developing countries. Travel by car is a good choice, as you can stop, get out, and stretch. Bring snacks and water along. Fasten the lap belt below your belly, low over your hips. Also be sure to wear the shoulder harness.  Intimacy  Unless your health care provider tells you to, there is no reason to stop having sex while you re pregnant. You or your partner may  notice changes in desire. Desire may be less in the first trimester, due to nausea and fatigue. In the second trimester, sex may be very enjoyable. The third trimester can be a challenge comfort-wise. Try different positions and see what s best for you both.      Pregnancy: Your First Trimester Changes  The first trimester is a time of rapid development for your baby. Because your baby is growing so quickly, it is important that you start a healthy lifestyle right away. By the end of the first trimester, your baby has formed all of its major body organs and weighs just over an ounce.    Month 1 (Weeks 1-4)  The placenta (the organ that nourishes your baby) begins to form. The heart and lungs begin to develop. Your baby is about 1/4 inch long by the end of the first month.    Month 2 (Weeks 5-8)  All of your baby s major body organs form. The face, fingers, toes, ears, and eyes appear. By the end of the month, your baby is about 1 inch long.    Month 3 (Weeks 9-12)  Your baby can open and close its fists and mouth. The sexual organs begin to form. As the first trimester ends, your baby is about 4 inches long.      Pregnancy: Your Weight  Being a healthy weight is important for both you and your baby. The weight you gain now is not just extra fat. It is also the weight of your baby. And it is the increased blood and fluids to support the baby. A slow, steady rate of gain is best. How much you should gain depends on your weight before getting pregnant. Check with your health care provider to find out what is right for you.    If You Gain Too Much  Gaining too much weight might cause you to feel tired or you could have a harder pregnancy or birth. If you and your health care provider decide you re gaining too much:    Eat fewer fats and sugars. Instead, eat fruit, vegetables, and whole-grain foods.    Drink plenty of water between meals.    Get at least 20 minutes of light exercise, such as walking, each day.    Don t  diet. You might not get enough of the nutrients you or your baby needs.    Keep a diet diary to help you gauge what and how much you are eating .    If You re Not Gaining Enough  If you don t gain enough, your baby could be too small or have health problems. Women tend to gain most of their weight in the second and third trimesters. For now:    Eat many types of foods. Make sure you get enough calcium, protein, and carbohydrates.    Don t skip meals.    Eat healthy snacks.    Pick nutrient-dense, high calorie healthy food like trail mix or protein shakes.    See a dietitian for help.    Talk to your healthcare provider if you have had an eating disorder or problems with certain foods.      Pregnancy: Common Questions  There are plenty of myths and  old wives  tales  surrounding pregnancy. You may need help  fact from fiction. On this sheet, you ll find answers to a few common questions. If you have other questions, talk with a midwife.    Will Working Harm My Baby?  In most cases, working throughout your pregnancy is not harmful at all. There may be concerns if the job involves dangerous machinery or chemicals, lifting, or standing for very long periods of time. Talk to your health care provider and employer about your particular job and pregnancy.  Why Can t I Change the Cat Litter Box?  Cats carry a disease called toxoplasmosis. In adult humans, it shows up as a mild infection of the blood and organs. If you are infected during pregnancy, the baby s brain and eyes could be damaged. To be safe, have someone else change the litter. If you must handle it, wear a paper mask over your nose and mouth. Also, wear gloves and wash your hands afterward.  Which Medications Are Safe?  No prescription or over-the-counter drug is safe for everyone all of the time. But sometimes medications are needed. Be sure your health care provider knows you are pregnant. Then use only the medications he or she advises you to take.  Please refer to the below resources for further information and discuss concern and questions with your midwife.  Is It True That I Can Overheat My Baby?  Yes. To avoid making your baby too warm:    Don t sit in a jacuzzi. A long, warm bath is fine, but not in water over 100 F.    Exercise less intensely if you feel fatigued. Base your workout on how you feel, not your heart rate. Heart rates aren t a good way to measure effort during pregnancy.  Can I Lift and Carry Safely?  Yes, if your health care provider doesn t tell you otherwise. Learn to lift and carry safely to avoid injury and reduce back pain during pregnancy. To protect your back:    Bend at the knees to bring the load nearer.    Get a good . Test the weight of the load.    Tighten your abdomen. Exhale as you lift.    Lift with your legs, not with your back.    Carry the load close to your body.    Hold the load so you can see where you are going.  What If I Get Sick?  Most women get sick at least once during pregnancy. Talk with your health care provider if you do. Most likely it will not affect your pregnancy. Get plenty of rest and fluids, and eat what you can. Talk to your health care provider before taking any medications.        HEALTHY PREGNANCY CARE: 10-14 WEEKS PREGNANT     By weeks 10 to 14 of your pregnancy, the placenta has formed inside your uterus. It may be possible to hear your baby's heartbeat with a doppler ultrasound device. Your baby's eyes can and do move. The arms and legs can bend.    GENETIC SCREENING OPTIONS AT [facility]                All testing is optional. We don t recommend or discourage any test; it is totally up to you and your partner. Some couples wish to know their risk of having a baby with a genetic defect and others do not. We will support your decision. Abnormal results may lead to a discussion of options for further testing.    Accurate dating of your pregnancy is important for all testing so an  ultrasound may be done prior to referral or testing.    No testing provides certainty; there are false positives and negatives associated with all testing, some more than others.    Most genetic testing is non-invasive (requires only a blood sample and sometimes an ultrasound or both).    It is always wise to check with your insurance carrier before proceeding.    Some testing can be done at our lab and some require a referral.    If you decide to do no testing, the 20 week ultrasound scan, which is a routine or standard ultrasound, has some ability to detect abnormalities in the baby, and identifies obstetric problems.    If you are over 35, you will have the option of a Level II ultrasound, which is a more detailed and targeting scan, that helps detect fetal anomalies as well as obstetric problems.      If you have a more complex family history of chromosomal abnormalities, a referral to a genetic counselor and/or a Maternal Fetal Medicine specialist, to help identify available tests, may be recommended.    TYPES OF GENETIC TESTING AVAILABLE INCLUDE:    Carrier Screening/Testing for Genetic Conditions    There are many inherited conditions for which testing or carrier testing is available. Carrier screening can test for conditions like Cystic Fibrosis, Thalassemia, Kem Sachs, Sickle Cell, Hemophilia, Muscular Dystrophy, [place] s disease and many others.     Cystic Fibrosis (CF) affects both males and females and people from all racial and ethnic groups. However, the disease is most common among Caucasians of Northern  descent. CF is also common among Latinos and American Indians. The disease is less common among  Americans and  Americans. More than 10 million Americans are carriers of a faulty CF gene and many of them don't know that they are CF carriers. One or both parents can be tested any time before or during pregnancy.    Talk to your care provider about your family history and  whether you should be screened. A referral to a genetic counselor at [facility] or [facility] can be made by your care provider at any time.    This is also tested for in the [place] Metabolic Screen that your infant receives 24 hours after birth.     Fetal DNA cell Testing: Rea or Innatal (Non-Invasive Prenatal Testing)    At 10 wk or greater, a blood sample can be drawn here at clinic or at any of our referral offices. It will provide highly accurate results with low false positive rates for trisomy 18, trisomy 21 (Down Syndrome), and trisomy 13 (> 99% trisomy detection rate at a false positive rate of <0.1%). Gender identification can also be obtained if desired (98% accuracy).  Can also detect some sex-linked chromosomal abnormalities (80-90% accuracy).  This is often done if one of the other screening tests is abnormal.        1st Trimester Screening:     Everyone has an age related risk of having a baby with a genetic abnormality. This testing provides a risk profile which is better than assigning risk based solely on your age.    Refines your risk of having a baby with a chromosomal abnormality such as Trisomy 21 & 18.    This test with detect a fetus with one of these disorders about 85% of the time.    A thickened nuchal fold can also be associated with cardiac defects.    This test requires a blood collection combined with ultrasound to obtain a measurement of fluid at back of baby s neck (nuchal translucency).    False positive results occur approximately 5% of cases.    An additional blood sample may be necessary in order to calculate your risk of having a baby with a neural tube defect (e.g. spina bifida).  This is called the AFP test (alpha fetal protein).  An ultrasound should be able to  any spinal defect as well.    Follow-up of an abnormal test may include a more extensive ultrasound study and you may be offered an amniocentesis (a small sample of amniotic fluid is  withdrawn and studied) for a definitive diagnosis    Quad Screen (4-marker screen)    Between 15 and 21 weeks, a sample of blood can be drawn at our lab to assess your risk of having a baby with Down Syndrome, Trisomy 18 and neural tube defects. Such testing is able to detect these conditions in 80% of cases and the false-positive rate is approximately 5%.     Follow-up of an abnormal test may include a more extensive ultrasound study and you may be offered an amniocentesis (a small sample of amniotic fluid is withdrawn and studied) for a definitive diagnosis      Referrals opportunities include:    [facility],  [facility], [facility]  o Offers nuchal translucency ultrasound; does not offer genetic counseling.    [facility] and [facility]):   o Approximately an hour long visit includes 30 min with genetic counselor who discusses all testing available and which ones might be beneficial to you based on age, personal and family history.    o Blood will be drawn and the nuchal translucency ultrasound will be discussed and performed if desired. The Free Fetal DNA testing ([place]/Verifi) can be drawn also.  o Targeted or detailed Level II ultrasounds are also available with these perinatology groups.        Breastfeeding: a Healthy Option for You and Your Baby  Consider breastfeeding for the healthiest way to feed your baby. Ask your midwife or physician for more information.     The choice of how you will feed your baby is important.  Before your baby s birth, you ll want to learn about the benefits of breastfeeding.  [facility] have been designated Baby Friendly; an initiative that was created by the World Health Organization and UNICEF.  This helps give you and your baby the best start in feeding their baby.    Why should I breastfeed my baby?    Babies are less likely to develop childhood obesity or diabetes    Babies are less likely  to suffer from recurrent ear infections    Babies are less likely to be hospitalized for respiratory conditions    Breast milk is rich in nutrients and antibodies-it is easy to digest    How does it benefit me?    Lowers the risk for diabetes, breast and ovarian cancer and postpartum depression    Moms can lose  baby weight  more quickly    Cost savings - formula can cost well over $1,500 per year    Convenient - no bottles and nipples to sterilize, no measuring and mixing formula    The physical contact with breastfeeding can make babies feel secure, warm and comforted     What about formula?  While you and your baby are staying with us at [facility], we will support whatever feeding choice you make for your baby.    Some important considerations:      The American Academy of Pediatrics, the World Health Organization, and many more organizations recommend exclusive breastfeeding for 6 months and continued breastfeeding while adding other foods for the first 1-2 years.      Any amount of breastmilk has benefits to both baby and mother.    Giving formula in replacement of breastfeeding can affect mother s milk supply.  If formula is needed, hospital staff will work with you on a plan to help develop your milk supply.    Formula alters the natural growth of good bacteria in the  stomach.     Research has found that first time mothers who offer formula in the hospital have a shorter duration of breastfeeding.    How can I start to prepare?     Start by having a conversation with your medical provider.     Talk with your partner, family and friends.     Attend a prenatal class that includes breastfeeding preparation. Birth and breastfeeding classes are offered by [facility]. Visit [employer] for class information.     After your baby s birth, hospital staff and lactation consultants will help you and your baby get off to a great start with breastfeeding.    As your center of gravity and  weight changes, use good body mechanics when changing positions and lifting. For example, use a straight back and your legs for support when lifting instead of bending over. Maintain good posture to prevent straining your muscles. Now is a good time to continue or restart your exercise program. Walking 30-60 minutes daily is an excellent way to keep fit. Yoga and swimming also offer many benefits.    The nausea and fatigue of early pregnancy have usually started to let up, so this is a good time to focus on nutrition. Consider attending a nutrition class. A healthy diet includes about 60 grams of protein each day (3-4 servings of dairy, 2-3 servings of meat/fish/poultry/nuts), 4-6 servings of whole grain foods, and 5-6 servings of fruits and vegetables. Remember to drink 6-8 glasses of water daily.    Watch for warning signs, such as     vaginal bleeding    fluid leaking from your vagina    severe abdominal pain    nausea and vomiting more than 4-5 times a day, or if you are unable to keep anything down    fever more than 100.4 degrees F.       RESOURCES   You can refer to the Starting Out Right book or find it online at [email or web address]/images/stories/[email or web address]-Starting-Out-Right.pdf or http://www.[facility].org/images/stories/flipbooks/[facility]-starting-out-right/[facility]-starting-out-right.html#p=[email or web address]    You can sign up for a weekly parenting e-mail that gives support, tips and advice from health care professionals that starts with pregnancy and continues through the toddler years. To register, go to [email or web address] at any time during your pregnancy.    Breastfeeding:    OUTPATIENT LACTATION RESOURCES     -Schedule an appointment with a [facility] CNJEFERSON who is also a Lactation Consultant by calling [phone]. We see women for breastfeeding visits at [facility] and [facility].     -Baby Café    Pregnant and interested in  breastfeeding?  Need answers to breastfeeding questions?  Want to help breastfeeding moms?  Already breastfeeding and want to meet other moms?    Join us at the Baby Café!    Baby Cafe is a free, drop-in service offering breast-feeding support for pregnant women, breast-feeding mothers and their families.  Come share tips and socialize with other mothers.  Babies and siblings are welcome (no childcare available).    Starting April 2018, Baby Café will be at 4 locations.  Please see below for the Baby Café closest to you! Hmong, Mauritanian, and Hungarian which is may be available at some sites.      [facility]  [address] [place] [address]  1st Wednesday: 10am-12pm    [facility]  [address] [place] [address]  3rd Wednesday 4-6pm    [facility]  [address] [place] [address]  4th Wednesday 10am-12:30pm    [facility]  [address] [place] [address]  4th Wednesdays: 4-6pm    -Attend a baby weigh in at [facility].  Lactation consultants are available to answer questions  [place]: Tuesdays 1:00 - 2:00  [facility]: Mondays 1:00 - 2:00   www.[email or web address]    -Attend one of the New Mama groups at [facility] in [facility].  [facility] also offers one-on-one in home and in office lactation consults.   [email or web address] [email or web address]    -Attend a LeLeche League meeting.  Multiple groups in several locations throughout the [facility]. The meetings are no-cost and always informative breastfeeding education session through [employer] La Leche League  Www.lllofmndas.[email or web address]/     Held at [facility] the second Thursday of each month at 7pm    Childbirth and Parenting Education:       Everyday Miracles:   https://www.everyday-miracles.org/    Free Video Series from [facility]: https://[email or web address].[facility]/academics/specialty-areas/nurse-midwifery/having-baby-prenatal-videos/having-baby-prenatal-and    [place]  parenting center: http://[facility]/   (443) 059-BABY  [place]: (education, yoga & wellness) www.[employer]  Enlightened Mama: www.enlightenedmama.[employer]   Childbirth collective: (Parent topic nights)  www.childbirthcollective.org/  Hypnobabies:  www.hypnobabiestwincities.com/  Hypnobirthing:  Http://hypnobirthing.com/  The Birth Hour: https://GLO Science/online-childbirth-class/    APPS and Podcasts:   Rylan Marsh Nurture    Evidence Based Birth  The Birth Hour (for birth stories)   Birthful   Expectful   The Longest Shortest Time  PregnancyPodcast Di Kothari    Book Recommendations:   Shawna Lincoln's Birthing From [facility]--first few chapters include a new-age tone, you may prefer to skip it and keep going, because there is good stuff later.  This book recommendation covers emotional preparation, but does cover coping with pain, and use of both pharmacological and nonpharmacological methods.    Dr. Goldstein' The Pregnancy Book and The Birth Book--the pregnancy book goes month-by month      The Birth Partner by Alexa Mckeon    Womanly Art of Breastfeeding by La Leche League International   Bestfeeding by Eden Payne--great pictures    Mothering Your Nursing Toddler, by Hilda Benton.   Addresses dealing with so many of the challenging behaviors of a nursing toddler.  How Weaning Happens, by La Leche League.  Discusses weaning at all ages, from medically necessary weaning of an infant, all the way up to age 5 (or older), with why/why not, and strategies.  Very empowering book both for deciding to wean and deciding not to.    American College of Nurse-Midwives (ACNM) http://www.midwife.org/; look at the informational handouts at http://www.midwife.org/Share-With-Women     www.mymidwife.org    Mother to Baby (Medication and Herbal guidance in pregnancy): http://www.mothertobaby.org  Toll-Free Hotline: 602.473.2690  LactMed (Medication use while breastfeeding):  "http://toxnet.nlm.nih.gov/newtoxnet/lactmed.htm    Women's Health.gov:  http://www.womenshealth.gov/a-z-topics/index.html    American pregnancy association - http://americanpregnancy.org    Centering Pregnancy (group prenatal care option): http://centeringhealthcare.org    Information about doulas:  Childbirth collective: http://www.childbirthcollective.org/  Doulas of [place] (SUNIL):  www.sunil.org  [facility]  project: http://Grady Health Systemcitiesdoulaproject.com/     Early Childhood and Family Education (ECFE):  ECFE offers parents hands-on learning experiences that will nourish a lifetime of teachable moments.  http://ecfe.info/ecfe-home/    March of Dimes www.LetsWombat     FDA - Nutrition  www.mypyramid.gov  Under \"For Consumers,\" click on \"pregnant and breastfeeding women.\"      Centers for Disease Control and Prevention (CDC) - Vaccines : http://www.cdc.gov/vaccines/       When researching information on the web, question the validity of websites.  The Biodel .gov, .edu and.org tend to be more reliable information.  If there are a lot of advertisements, be cautious of the information provided. Stay away from blogs and chat rooms please!      Nutrition & supplements:     Prenatal vitamin (those with 600-1000 mcg folic acid and 27 mg of iron are enough).  Take with food or Juice     4-5 servings of dairy or other calcium rich foods (fish, leafy greens, soy) per day - if not, take 500-1000 mg additional calcium (Tums, pills, chews). Take with dairy     Vitamin D3 7810-9387 IU geltab daily.  Take with fattiest meal.  Look for fortified foods also (Dairy, Juice)     2-3 (4) oz servings of fish, seafood, nuts (walnuts & almonds), oils, avocado per week - if not, take Omega 3 Fatty acids: DHA & JENI 2907-5409 mg per day.  Other names: cod liver oil, fish oil. Take with fattiest meal.  Some prenatals have DHA, but typically not a sufficient dose.    Fish: Do not eat shark, swordfish, michael mackerel, or tilefish " when you are pregnant or breastfeeding.  They contain high levels of mercury.  Limit white (albacore) tuna to no more than 6 ounces per week. Http://www.fda.gov/downloads/ForConsumers/ConsumerUpdates/HRA126276.pdf         Touring the Maternity Care Center  At this time we are offering a virtual tour of the Maternity Care Centers at both [facility] and [facility]:   [facility]: https://www.Livio Radio/Locations/[facility]/Maternity-Care-Center  [facility]:   https://Livio Radio/[facility]/the-birthplace/tours  https://www.Livio Radio/[facility]/MaternityCare-Center/#virtual_tour  When in person tours become available, registrations is required. To schedule a tour at either [facility] or [facility], please do so online using the following links:  [facility] - https://www.[place].CiteeCar/registerlist.[email or web address]?[email or web address]&[email or web address]&p=2&hikph=211&ps=1&group=37&it=1&qsv=081  [facility] - https://www.[place].CiteeCar/registerlist.asp?s=[email or web address]&[email or web address]&[email or web address]&p=2&nlhne=928&ps=1&group=38&it=1&fdk=598     You are invited to  Meet the [facility]    Virtual:   https://www.[facility].org/classes-and-events/meet-the-midwives-from-[facility]    A way to tour the hospital Labor and Delivery unit and meet the midwives in our group was postponed at the start of hospital restrictions following COVID-19. We will resume these when able.    Please call [phone] for ongoing updates.

## 2022-03-22 LAB
C TRACH DNA SPEC QL NAA+PROBE: NEGATIVE
HBV SURFACE AG SERPL QL IA: NONREACTIVE
HCV AB SERPL QL IA: NEGATIVE
HIV 1+2 AB+HIV1 P24 AG SERPL QL IA: NEGATIVE
N GONORRHOEA DNA SPEC QL NAA+PROBE: NEGATIVE
RUBV IGG SERPL QL IA: 13.5 INDEX
RUBV IGG SERPL QL IA: POSITIVE
T PALLIDUM AB SER QL: NONREACTIVE

## 2022-03-23 LAB — BACTERIA UR CULT: NORMAL

## 2022-03-28 LAB — SCANNED LAB RESULT: NORMAL

## 2022-04-04 ENCOUNTER — DOCUMENTATION ONLY (OUTPATIENT)
Dept: MIDWIFE SERVICES | Facility: CLINIC | Age: 47
End: 2022-04-04
Payer: COMMERCIAL

## 2022-04-18 ENCOUNTER — TRANSCRIBE ORDERS (OUTPATIENT)
Dept: MATERNAL FETAL MEDICINE | Facility: HOSPITAL | Age: 47
End: 2022-04-18

## 2022-04-18 ENCOUNTER — PRENATAL OFFICE VISIT (OUTPATIENT)
Dept: MIDWIFE SERVICES | Facility: CLINIC | Age: 47
End: 2022-04-18
Payer: COMMERCIAL

## 2022-04-18 VITALS
HEIGHT: 64 IN | BODY MASS INDEX: 27.14 KG/M2 | DIASTOLIC BLOOD PRESSURE: 72 MMHG | HEART RATE: 76 BPM | SYSTOLIC BLOOD PRESSURE: 116 MMHG | WEIGHT: 159 LBS

## 2022-04-18 DIAGNOSIS — O09.529 SUPERVISION OF HIGH-RISK PREGNANCY OF ELDERLY MULTIGRAVIDA: Primary | ICD-10-CM

## 2022-04-18 DIAGNOSIS — O26.90 PREGNANCY RELATED CONDITION, ANTEPARTUM: Primary | ICD-10-CM

## 2022-04-18 PROCEDURE — 99207 PR PRENATAL VISIT: CPT | Performed by: ADVANCED PRACTICE MIDWIFE

## 2022-04-18 RX ORDER — CHOLECALCIFEROL (VITAMIN D3) 50 MCG
1 TABLET ORAL DAILY
Qty: 60 TABLET | Refills: 3 | Status: SHIPPED | OUTPATIENT
Start: 2022-04-18 | End: 2022-07-20

## 2022-04-18 RX ORDER — ASCORBIC ACID 125 MG
TABLET,CHEWABLE ORAL
COMMUNITY
End: 2022-05-25

## 2022-04-18 RX ORDER — FOLIC ACID, .BETA.-CAROTENE, ASCORBIC ACID, CHOLECALCIFEROL, .ALPHA.-TOCOPHEROL ACETATE, DL-, THIAMINE MONONITRATE, RIBOFLAVIN, NIACINAMIDE, PYRIDOXINE HYDROCHLORIDE, CYANOCOBALAMIN, CALCIUM PANTOTHENATE, CALCIUM CARBONATE, FERROUS FUMARATE, AND ZINC OXIDE 1; 1000; 100; 400; 30; 3; 3; 15; 20; 12; 7; 200; 29; 20 MG/1; [IU]/1; MG/1; [IU]/1; [IU]/1; MG/1; MG/1; MG/1; MG/1; UG/1; MG/1; MG/1; MG/1; MG/1
1 TABLET, CHEWABLE ORAL DAILY
Qty: 60 TABLET | Refills: 3 | Status: SHIPPED | OUTPATIENT
Start: 2022-04-18 | End: 2024-03-05

## 2022-04-18 NOTE — PATIENT INSTRUCTIONS
"[facility]- Contact information:  Appointment line and to get a hold of CNM in clinic Monday-Friday 8 am - 5 pm:  [phone].  There are some clinics with early start times (1st appointment 7:40 am) and others with evening hours (last appointment 6:20 pm).  Most are typically open from 8 am to 5 pm.    CNM on call answering service: [phone].  Specify your hospital of choice and leave a brief message for CNM;  will then page CNM who is on call at your specified hospital and you should receive a call back with 15 minutes.  Be sure that your ringer is audible and that you can accept blocked calls so that we can get back in touch with you! This number should be reserved for urgent needs if during the day, before 8 am, after 5 pm, weekends, holidays.    Contact the on-call CNM with warning signs, such as:  vaginal bleeding   Vaginal discharge and itching or pain and burning during urination  Leg/calf pain or swelling on one side  severe abdominal pain  nausea and vomiting more than 4-5 times a day, or if you are unable to keep anything down  fever more than 100.4 degrees F.     SinDelantalhart  After each of your visits you are welcome to check Cubicle for your visit summary including education and links to information relevant to your pregnancy and/or well woman care.   Find the \"Visits\" tab at the top of the page, you will see a list of recent visits and for each visit a for link for \"View After Visit Summary.\" View of your After Visit Summary will allow you to read our recommendations from your visit, review any education provided, and link to websites with useful information.   If you have any questions or difficulty navigating Nutzvieh24, please feel free to contact us and we will do our best to direct you.        Touring the Maternity Care Center  At this time we are offering a virtual tour of the Maternity Care Centers at both [facility] and [facility]:   [facility]: " "https://www.fair[facility]y Hospital.org/Locations/[facility]/Maternity-Care-Center  [facility]:   https://Vanu Coverage.org/[facility]/the-birthplace/tours  https://www.[place].org/Locations/[facility]/Maternity-Care-Center/#virtual_tour  When in person tours become available, registrations is required. To schedule a tour at either [facility] or [facility], please do so online using the following links:  [facility] - https://www.CitizenNet.Bitauto Holdings/registerlist.asp?s=[email or web address]&[email or web address]&vs=5&p=2&hmzvs=933&ps=1&group=37&it=1&nes=557  [facility] - https://www.Nativoo/registerlist.asp?s=[email or web address]&[email or web address]&vs=5&p=2&zjgex=247&ps=1&group=38&it=1&lwr=080         Meet the Midwives from [facility]  You are invited to an informational meet and greet with [facility] Certified Nurse-Midwives. Our free \"Meet the Midwives\" event is a great opportunity to learn about our midwives' philosophy and experience, the hospitals where we can assist with your birth, and answer questions you may have. Partners, friends, and family are welcome to attend. Currently, this is a virtual event.  Date  First Tuesday of every month at 7 pm.    Link to next (live) meeting  https://www.[place].org/classes-and-events/meet-the-midwives-from-[facility]  To Join by Telephone (audio only) Call:   [phone] Phone Conference ID: [id number]#        Ultrasound Appointment:   Don't forget to schedule your ultrasound appointment around 20 weeks into your pregnancy. Your midwife will order the exam for you to schedule at [phone] with [facility] radiology locations or at the independent radiology clinic of your preference.      GENETIC SCREENING OPTIONS AT [facility]        All testing is optional. We don t recommend or discourage any test; it is totally up to you and your partner. Some couples wish to know their risk of having a baby with a genetic defect and others do not. We will " support your decision. Abnormal results may lead to a discussion of options for further testing.  Accurate dating of your pregnancy is important for all testing so an ultrasound may be done prior to referral or testing.  No testing provides certainty; there are false positives and negatives associated with all testing, some more than others.  Most genetic testing is non-invasive (requires only a blood sample and sometimes an ultrasound or both).  It is always wise to check with your insurance carrier before proceeding.  Some testing can be done at our lab and some require a referral.  If you decide to do no testing, the 20 week ultrasound scan has some ability to detect abnormalities in the baby.    [place] or Verifi  At 10 wk or greater, a blood sample can be drawn here at clinic or at any of our referral offices. It will provide highly accurate results with low false positive rates for trisomy 18, trisomy 21 (Down Syndrome), and trisomy 13 (> 99% trisomy detection rate at a false positive rate of <0.1%). Gender identification can also be obtained if desired.    Quad Screen (4-marker screen)  Between 15 and 21 weeks, a sample of blood can be drawn at our lab to assess your risk of having a baby with Down Syndrome, Trisomy 18 and neural tube defects. Such testing is able to detect these conditions in 80-90% of cases and the false-positive rate is approximately 5%.     Why is dental care in pregnancy important?  During pregnancy, you are more likely to have problems with your teeth or gums. If you have an infection in your teeth or gums, the chance of your baby being premature (born early) or having low birth weight may be slightly higher than if your teeth and gums are healthy  Dental care is safe during pregnancy and important for the health of you and your baby.   What should you know before you see the dentist?  Make sure your dentist knows that you are pregnant.  If medications for infection or for pain are  needed, your dentist can prescribe ones that are safe for you and your baby.  Tell your dentist about any changes you have noticed since you became pregnant and about any medications r or supplements you are taking.  Routine x-rays should be avoided in pregnancy, but it may be necessary if there is a problem or an emergency.   Your body should be covered with a lead apron to protect you and your baby.  Dental work can be done safely at any point in pregnancy. If possible, it is best to delay treatments and pro- cedures until after the first trimester.    For more information on dental health in pregnancy: http://onlinelibrary.childs.com/store/10.1111/jmwh.14813/asset/wxpn33895.pdf?v=1&t=sgmk3f62&u=6083[email or web address]     Quickening:   Your Baby in the Second Trimester of Pregnancy  At the start of the second trimester, you will feel your baby's movements, which get stronger as the baby grows bigger. At the end of the fourth month, your baby weighs about five ounces. Her kidneys begin to produce urine. During visits to your health care provider, you will be able to hear your baby's heartbeat more clearly. Your baby can move and hear your voice.   By the end of the fifth month, you'll be able to feel light movements (called quickening) of your fetus. Your baby is sleeping and waking at regular intervals, and is more active than before. At this point, she is about nine inches long and weighs about one-half to one pound. During the sixth month, your baby's features become clearer. Eyebrows, eyelashes, and hair are developing. She also has finger and toe prints, and may be kicking strongly.  By the end of the second trimester, your baby weighs as much as two pounds and is about 11 inches long.         Gestational diabetes  Gestational diabetes develops during pregnancy (gestation). Like other types of diabetes, gestational diabetes affects how your cells use sugar (glucose). Gestational diabetes  causes high blood sugar that can affect your pregnancy and your baby's health.  Any pregnancy complication is concerning, but there's good news. Expectant moms can help control gestational diabetes by eating healthy foods, exercising and, if necessary, taking medication. Controlling blood sugar can prevent a difficult birth and keep you and your baby healthy.  In gestational diabetes, blood sugar usually returns to normal soon after delivery. But if you've had gestational diabetes, you're at risk for type 2 diabetes. You'll continue working with your health care team to monitor and manage your blood sugar.    Who is at risk?  This is a list of factors that increase the risk of developing gestational diabetes for women during pregnancy:      Overweight prior to pregnancy (20% or more over ideal body weight)      High risk ethnic group: , , ,       Impaired glucose tolerance or traces of glucose in the urine      Family history of diabetes      Previously giving birth to a baby over 9 lbs. or stillborn      Previous pregnancy with gestational diabetes    Prevention:  There are no guarantees when it comes to preventing gestational diabetes -- but the more healthy habits you can adopt before pregnancy, the better. If you've had gestational diabetes, these healthy choices may also reduce your risk of having it in future pregnancies or developing type 2 diabetes down the road.  Eat healthy foods. Choose foods high in fiber and low in fat and calories. Focus on fruits, vegetables and whole grains. Strive for variety to help you achieve your goals without compromising taste or nutrition. Watch portion sizes.   Keep active. Exercising before and during pregnancy can help protect you from developing gestational diabetes. Aim for 30 minutes of moderate activity on most days of the week. Take a brisk daily walk. Ride your bike. Swim laps.  If you can't fit a single 30-minute workout  into your day, several shorter sessions can do just as much good. Park in the distant lot when you run errands. Get off the bus one stop before you reach your destination. Every step you take increases your chances of staying healthy.  Lose excess pounds before pregnancy. Doctors don't recommend weight loss during pregnancy. But if you're planning to get pregnant, losing extra weight beforehand may help you have a healthier pregnancy.  Focus on permanent changes to your eating habits. Motivate yourself by remembering the long-term benefits of losing weight, such as a healthier heart, more energy and improved self-esteem.    Preventing Diabetes after Pregnancy:  It is estimated that 35-60 percent of women that have had gestational diabetes will develop type 2 diabetes in the future. It is also thought that children from these pregnancies have a greater chance of developing obesity and type 2 diabetes.    If you do have prediabetes or have risk factors for having diabetes, research shows that doing just two things can help you prevent or delay type 2 diabetes: Lose 5% to 7% of your body weight, which would be 10 to 14 pounds for a 200-pound person; and get at least 150 minutes each week of physical activity, such as brisk walking.    RESOURCES   You can refer to the Starting Out Right book or find it online at http://www.[facility].org/images/stories/maternity/[facility]-Starting-Out-Right.pdf p  You can sign up for a weekly parenting e-mail that gives support, tips and advice from health care professionals that starts with pregnancy and continues through the toddler years. To register, go to www.[facility].org/baby at any time during your pregnancy.    Breastfeeding Information:  OUTPATIENT LACTATION RESOURCES    -Schedule a clinic appointment with a [facility]alth [place] [facility] with dedicated clinic hours for breastfeeding assistance by calling [phone]. Breastfeeding clinic visits are at  [facility] on Wednesdays, [facility] on Tuesdays and [place] [facility] on Thursdays.       [facility] Baby Café  Due to COVID-19, all Baby Café sessions are canceled until further notice. For lactation support, please contact one of our bilingual staff:  Isatu (IBCLC) [phone]  Amita (IBCLC/ [place] Timorese) [phone]  Naomi (Hmong) [phone]  Micha (Sammarinese) [phone]  Baby Café is a free, drop-in service offering breastfeeding/chestfeeding support. Come share tips and socialize with other pregnant, breastfeeding/chestfeeding families. Babies and siblings are welcome (no  available).  We offer:  Professionally trained lactation staff.  Resource books for lending.  Relaxed and fun atmosphere.  Refreshments.  Locations  Baby Café is offered at several locations.  Please see below for the Baby Café closest to you.  CANCELED UNTIL FURTHER NOTICE  [facility]  [address] [address]  1st Wednesdays of the month   10 a.m. - Noon  CANCELED UNTIL FURTHER NOTICE  [facility] [address], [place], [address]  4th Wednesdays of the month   10 a.m. - 12:30 p.m.     CANCELED UNTIL FURTHER NOTICE  [facility]  [address], [place], [address]  4th Wednesdays of the month  4 - 6 p.m.  CANCELED UNTIL FURTHER NOTICE  [facility] [address], [place], [address]  2nd Thursdays of the month.  9:30-11:30 a.m.  Enter through the east end of the building, the blue Door C.  Ring the ECFE buzzer to be let in.   More information  Amita Ramirez  [phone]  [email or web address]     -Attend a baby weigh in at [facility].  Lactation consultants are available to answer questions  [place]: Tuesdays 1:00 - 2:00  [facility]: Mondays 1:00 - 2:00   www.[facility] CenterLendinero.SchemaLogic    -Attend one of the New Mama groups at [facility].  [facility] also offers one-on-one in home and in  office lactation consults.   www.Tencent    -Attend a Gianluca Marie meeting.  Multiple groups in several locations throughout the [facility]. The meetings are no-cost and always informative breastfeeding education session through Internatal La Leche League  Www.lldaquanfmndas.org/  Medication use while breastfeeding: http://toxnet.nlm.nih.gov/newtoxnet/lactmed.htm     Childbirth and Parenting Education:     Everyday Miracles:   https://www.everyday-miracles.org/    Free Video Series from [facility]: https://nursing.[facility]/academics/specialty-areas/nurse-midwifery/having-baby-prenatal-videos/having-baby-prenatal-and    [facility]: http://Novita TherapeuticsDavid Grant USAF Medical CenterWiCastr Limited/   [phone]  [place]: (education, yoga & wellness) www.I Just Shared  Enlightened Mama: www.Tencent   Childbirth collective: (Parent topic nights)  www.childbirthcollective.org/  Hypnobabies:  www.hypnobabiestwincities.ONStor/  Hypnobirthing:  Http://hypnobirthing.com/  The Birth Hour: https://Vital Connect/online-childbirth-class/    APPS and Podcasts:   Rylan Marsh Nurture    Evidence Based Birth  The Birth Hour (for birth stories)   Birthful   Expectful   The Longest Shortest Time  PregnancyPodcast Di Kothari    Book Recommendations:   Shawna Cynthiana's Birthing From Within--first few chapters include a new-age tone, you may prefer to skip it and keep going, because there is good stuff later.  This book recommendation covers emotional preparation, but does cover coping with pain, and use of both pharmacological and nonpharmacological methods.    Dr. Goldstein' The Pregnancy Book and The Birth Book--the pregnancy book goes month-by month    The Birth Partner by Alexa Mckeon    Womanly Art of Breastfeeding by La Leche League International   Bestfeeding by Eden Payne--great pictures    Mothering Your Nursing Toddler, by Hilda Benton.   Addresses dealing with so many of the challenging behaviors of a  "nursing toddler.  How Weaning Happens, by La Leche League.  Discusses weaning at all ages, from medically necessary weaning of an infant, all the way up to age 5 (or older), with why/why not, and strategies.  Very empowering book both for deciding to wean and deciding not to.    American College of Nurse-Midwives (ACNM) http://www.midwife.org/; look at the informational handouts at http://www.midwife.org/Share-With-Women     www.mymidwife.org    Mother to Baby (Medication and Herbal guidance in pregnancy): http://www.mothertobaby.org  Toll-Free Hotline: 736.788.9954  LactMed (Medication use while breastfeeding): http://toxnet.nlm.nih.gov/newtoxnet/lactmed.htm    Women's Health.gov:  http://www.womenshealth.gov/a-z-topics/index.html    American pregnancy association - http://americanpregnancy.org    Centering Pregnancy (group prenatal care option): http://centeringhealthcare.org    Information about doulas:  Childbirth collective: http://www.childbirthcollective.org/  Doulas of [place] (SUNIL):  www.sunil.org  [facility]  project: http://twincitiesdoulaproject.com/     Early Childhood and Family Education (ECFE):  ECFE offers parents hands-on learning experiences that will nourish a lifetime of teachable moments.  http://ecfe.info/ecfe-home/    March of Dimes www.Device Innovation Group.com     FDA - Nutrition  www.mypyramid.gov  Under \"For Consumers,\" click on \"pregnant and breastfeeding women.\"      Centers for Disease Control and Prevention (CDC) - Vaccines : http://www.cdc.gov/vaccines/       When researching information on the web, question the validity of websites.  The domains .gov, .edu and.org tend to be more reliable information.  If there are a lot of advertisements, be cautious of the information provided. Stay away from blogs and chat rooms please!    "

## 2022-04-18 NOTE — PROGRESS NOTES
Marley is here alone for a routine prenatal visit at 14w6d. Reviewed IOB labs, dating ultrasound.  Thankful for more energy!  Has not followed up with [facility] but feels more settled and stable emotionally/mentally.  Attends an ECFE parenting group and finds this extremely helpful.  Recently identified that a messy house is very triggering for her.  Discussed ways to address this, may consider hiring some help to keep house clean.  Encouraged to consider reaching out to [facility] by third trimester to discuss postpartum plans.  Reviewed NIPS screening.  Accepts offer for level 2 ultrasound. Pregnancy discomforts include occasional round ligament pain.  Quickening last week!  Feels safe in relationship. Second trimester pregnancy anticipatory guidance reviewed.   Enc follow-up in 4 weeks or sooner prn.

## 2022-05-11 ENCOUNTER — PRE VISIT (OUTPATIENT)
Dept: MATERNAL FETAL MEDICINE | Facility: HOSPITAL | Age: 47
End: 2022-05-11
Payer: COMMERCIAL

## 2022-05-16 ENCOUNTER — OFFICE VISIT (OUTPATIENT)
Dept: MATERNAL FETAL MEDICINE | Facility: HOSPITAL | Age: 47
End: 2022-05-16
Attending: ADVANCED PRACTICE MIDWIFE
Payer: COMMERCIAL

## 2022-05-16 ENCOUNTER — ANCILLARY PROCEDURE (OUTPATIENT)
Dept: ULTRASOUND IMAGING | Facility: HOSPITAL | Age: 47
End: 2022-05-16
Attending: ADVANCED PRACTICE MIDWIFE
Payer: COMMERCIAL

## 2022-05-16 ENCOUNTER — DOCUMENTATION ONLY (OUTPATIENT)
Dept: MIDWIFE SERVICES | Facility: CLINIC | Age: 47
End: 2022-05-16

## 2022-05-16 DIAGNOSIS — O26.90 PREGNANCY RELATED CONDITION, ANTEPARTUM: ICD-10-CM

## 2022-05-16 DIAGNOSIS — O09.522 AMA (ADVANCED MATERNAL AGE) MULTIGRAVIDA 35+, SECOND TRIMESTER: Primary | ICD-10-CM

## 2022-05-16 PROCEDURE — 76811 OB US DETAILED SNGL FETUS: CPT | Mod: 26 | Performed by: OBSTETRICS & GYNECOLOGY

## 2022-05-16 PROCEDURE — 99207 PR NO CHARGE LOS: CPT | Performed by: OBSTETRICS & GYNECOLOGY

## 2022-05-16 PROCEDURE — 76811 OB US DETAILED SNGL FETUS: CPT

## 2022-05-16 NOTE — PROGRESS NOTES
"Please see \"Imaging\" tab under \"Chart Review\" for details of today's visit.    Cedrick Cordoba    "

## 2022-05-17 ENCOUNTER — DOCUMENTATION ONLY (OUTPATIENT)
Dept: MIDWIFE SERVICES | Facility: CLINIC | Age: 47
End: 2022-05-17
Payer: COMMERCIAL

## 2022-05-25 ENCOUNTER — PRENATAL OFFICE VISIT (OUTPATIENT)
Dept: MIDWIFE SERVICES | Facility: CLINIC | Age: 47
End: 2022-05-25
Payer: COMMERCIAL

## 2022-05-25 VITALS
SYSTOLIC BLOOD PRESSURE: 104 MMHG | WEIGHT: 161 LBS | DIASTOLIC BLOOD PRESSURE: 62 MMHG | HEIGHT: 64 IN | OXYGEN SATURATION: 98 % | BODY MASS INDEX: 27.49 KG/M2 | HEART RATE: 70 BPM

## 2022-05-25 DIAGNOSIS — O09.529 SUPERVISION OF HIGH-RISK PREGNANCY OF ELDERLY MULTIGRAVIDA: ICD-10-CM

## 2022-05-25 PROCEDURE — 99207 PR PRENATAL VISIT: CPT | Performed by: MIDWIFE

## 2022-05-25 NOTE — PROGRESS NOTES
Marley presents to [facility] clinic for a routine prenatal visit at 20w1d. Feeling well.  Taking benedryl for allergies today.  Yeast symptoms today-minor x 2 wks--will treat at home with Monistat 3 day that pt already has at home.  Anatomy scan level II with MFM reviewed.   Pregnancy discomforts include:   Sleeping: ok for the most part  Fetal movement: yes, quickening  Wt gain is a little low--disc healthy diet  OB consult for repeat C-birth vs TOLAC     COVID-19 in pregnancy discussed. Pt is vaccinated x 2, due for booster but declines today, will consider next visit. Informed CDC recommends COVID-19 vaccines for pregnant women, a recommendation that is in line with that of ACOG and SMFM (Society of Maternal Fetal Medicine).  Pt is washing her hands frequently, and limiting social contact to avoid exposure from coronavirus, wearing a mask when in public. Pt is not able to work from home ().   Reviewed COVID19 hospital policies, SARS-CoV-2 PCR testing on admission, waterbirth and nitrous an option if SARS-CoV-2 PCRtest is negative.   Reviewed our recommendation that she take an iron supplement daily to boost her iron stores prior to birth as we anticipate ashortage of blood products due to COVID19 pandemic.   Pt is currently supplementing daily; discussed strategies to manage constipation.   Recommended Emergen-C for restless leg syndrome.  Pt will have f/u US in 2 wks for sub optimal views.    Mid pregnancy anticipatory guidance reviewed.     Enc follow-up in 4weeks or sooner prn.

## 2022-06-06 ENCOUNTER — DOCUMENTATION ONLY (OUTPATIENT)
Dept: MIDWIFE SERVICES | Facility: CLINIC | Age: 47
End: 2022-06-06

## 2022-06-06 ENCOUNTER — ANCILLARY PROCEDURE (OUTPATIENT)
Dept: ULTRASOUND IMAGING | Facility: HOSPITAL | Age: 47
End: 2022-06-06
Attending: OBSTETRICS & GYNECOLOGY
Payer: COMMERCIAL

## 2022-06-06 ENCOUNTER — OFFICE VISIT (OUTPATIENT)
Dept: MATERNAL FETAL MEDICINE | Facility: HOSPITAL | Age: 47
End: 2022-06-06
Attending: OBSTETRICS & GYNECOLOGY
Payer: COMMERCIAL

## 2022-06-06 DIAGNOSIS — O09.522 AMA (ADVANCED MATERNAL AGE) MULTIGRAVIDA 35+, SECOND TRIMESTER: ICD-10-CM

## 2022-06-06 DIAGNOSIS — O09.522 AMA (ADVANCED MATERNAL AGE) MULTIGRAVIDA 35+, SECOND TRIMESTER: Primary | ICD-10-CM

## 2022-06-06 PROCEDURE — 76816 OB US FOLLOW-UP PER FETUS: CPT | Mod: 26 | Performed by: OBSTETRICS & GYNECOLOGY

## 2022-06-06 PROCEDURE — 76816 OB US FOLLOW-UP PER FETUS: CPT

## 2022-06-06 PROCEDURE — 99207 PR NO CHARGE LOS: CPT | Performed by: OBSTETRICS & GYNECOLOGY

## 2022-06-06 NOTE — PROGRESS NOTES
"Please see \"Imaging\" tab under Chart Review for full details.    Nichelle Thakur MD  Maternal Fetal Medicine    "

## 2022-06-07 ENCOUNTER — DOCUMENTATION ONLY (OUTPATIENT)
Dept: MIDWIFE SERVICES | Facility: CLINIC | Age: 47
End: 2022-06-07
Payer: COMMERCIAL

## 2022-06-17 ENCOUNTER — HOSPITAL ENCOUNTER (OUTPATIENT)
Facility: HOSPITAL | Age: 47
End: 2022-06-17
Admitting: MIDWIFE
Payer: COMMERCIAL

## 2022-06-17 ENCOUNTER — TELEPHONE (OUTPATIENT)
Dept: MIDWIFE SERVICES | Facility: CLINIC | Age: 47
End: 2022-06-17
Payer: COMMERCIAL

## 2022-06-17 ENCOUNTER — HOSPITAL ENCOUNTER (OUTPATIENT)
Facility: HOSPITAL | Age: 47
Discharge: HOME | End: 2022-06-17
Attending: MIDWIFE | Admitting: MIDWIFE
Payer: COMMERCIAL

## 2022-06-17 VITALS
RESPIRATION RATE: 18 BRPM | DIASTOLIC BLOOD PRESSURE: 61 MMHG | TEMPERATURE: 98.1 F | HEART RATE: 68 BPM | SYSTOLIC BLOOD PRESSURE: 109 MMHG

## 2022-06-17 PROBLEM — Z36.89 ENCOUNTER FOR TRIAGE IN PREGNANT PATIENT: Status: ACTIVE | Noted: 2022-06-17

## 2022-06-17 LAB
ALBUMIN UR-MCNC: NEGATIVE MG/DL
APPEARANCE UR: CLEAR
BILIRUB UR QL STRIP: NEGATIVE
CLUE CELLS: ABNORMAL
COLOR UR AUTO: NORMAL
GLUCOSE UR STRIP-MCNC: NEGATIVE MG/DL
HGB UR QL STRIP: NEGATIVE
KETONES UR STRIP-MCNC: NEGATIVE MG/DL
LEUKOCYTE ESTERASE UR QL STRIP: NEGATIVE
NITRATE UR QL: NEGATIVE
PH UR STRIP: 5.5 [PH] (ref 5–7)
RUPTURE OF FETAL MEMBRANES BY ROM PLUS: NEGATIVE
SP GR UR STRIP: 1.02 (ref 1–1.03)
TRICHOMONAS, WET PREP: ABNORMAL
UROBILINOGEN UR STRIP-MCNC: <2 MG/DL
WBC'S/HIGH POWER FIELD, WET PREP: ABNORMAL
YEAST, WET PREP: ABNORMAL

## 2022-06-17 PROCEDURE — 87591 N.GONORRHOEAE DNA AMP PROB: CPT | Performed by: MIDWIFE

## 2022-06-17 PROCEDURE — 81003 URINALYSIS AUTO W/O SCOPE: CPT | Performed by: MIDWIFE

## 2022-06-17 PROCEDURE — 87086 URINE CULTURE/COLONY COUNT: CPT | Performed by: MIDWIFE

## 2022-06-17 PROCEDURE — 84112 EVAL AMNIOTIC FLUID PROTEIN: CPT | Performed by: MIDWIFE

## 2022-06-17 PROCEDURE — 99214 OFFICE O/P EST MOD 30 MIN: CPT | Performed by: MIDWIFE

## 2022-06-17 PROCEDURE — 87210 SMEAR WET MOUNT SALINE/INK: CPT | Performed by: MIDWIFE

## 2022-06-17 PROCEDURE — G0463 HOSPITAL OUTPT CLINIC VISIT: HCPCS

## 2022-06-17 RX ORDER — METOCLOPRAMIDE 10 MG/1
10 TABLET ORAL EVERY 6 HOURS PRN
Status: DISCONTINUED | OUTPATIENT
Start: 2022-06-17 | End: 2022-06-18 | Stop reason: HOSPADM

## 2022-06-17 RX ORDER — LIDOCAINE 40 MG/G
CREAM TOPICAL
Status: DISCONTINUED | OUTPATIENT
Start: 2022-06-17 | End: 2022-06-18 | Stop reason: HOSPADM

## 2022-06-17 RX ORDER — PROCHLORPERAZINE 25 MG
25 SUPPOSITORY, RECTAL RECTAL EVERY 12 HOURS PRN
Status: DISCONTINUED | OUTPATIENT
Start: 2022-06-17 | End: 2022-06-18 | Stop reason: HOSPADM

## 2022-06-17 NOTE — TELEPHONE ENCOUNTER
PHONE CALL TO ON-CALL CNM:  Marley calls with concerns about leaking a watery discharge.    States she first noticed it ~2 wks ago.  It has happened intermittently since then.  She would describe it as thin consistency to watery, yellowish-brownish in color.  Seemed to be a bit worse today, so put a pad on has visualized it on her pad.  Concerned.    No red or dark brown blood.  No cramping or contractions.  Has noticed a bit of vaginal irritation.  Also mild urinary urgency (no dysuria), and a bit of vaginal odor.    Baby moving normally.    Did have intercourse this AM, but reports that this is not semen running back out of vagina.  Additionally, has been present for a number of weeks    As this writer is unable to rule out rupture of membranes as a possibility for Marley's symptoms, have suggested a visit to [facility] for evaluation of discharge (like ROM+, wet prep, UA/UC, etc).  Marley agrees to this plan, but won't have children for 2.5 hours.  Plan made for her to arrive to [place] about 6:00 PM.

## 2022-06-18 LAB
C TRACH DNA SPEC QL PROBE+SIG AMP: NEGATIVE
N GONORRHOEA DNA SPEC QL NAA+PROBE: NEGATIVE

## 2022-06-18 NOTE — PROGRESS NOTES
Outpatient/Triage Note:    Patient Name:  Marley Weldon  :      1975  MRN:      [id number]    Assessment:   @ 23w3d here for evaluation of vaginal discharge.     Plan:   - Discharge to home undelivered. Reviewed warning signs including decreased fetal movement, leaking of fluid, vaginal bleeding, or signs of labor. Reviewed how to contact on-call CNM. Follow-up in clinic with CNM as scheduled or sooner as needed. All questions answered. Agrees with plan.     Subjective:  Marley Weldon is a 46 year old  at 23 3/7 weeks, with an EDC of 10/11/22 who presented to [facility] [place], [place] for evaluation of vaginal discharge. Denies leaking of fluid, active bleeding, or changes in fetal movement. Had intercourse this morning. Has had constipation off and on during pregnancy, last BM was today but it was hard. Taking Magnesium Citrate daily. Discussed other OTC remedies for relief of constipation. Enc fruits and veggies in diet and increased water intake.     Objective:  Vital signs: LMP 2022 (Exact Date)   FHR: 140 on [place] per RN  Uterine contractions: none     No intake or output data in the 24 hours ending 22    Results:  Wet prep: all negative 1+ WBC  ROM plus Negative  UA all negative  GC/CT pending      Provider:Ernestine Marcum DNP,APRN,CNM    25 minutes on the date of the encounter doing chart review, review of test results, interpretation of tests, patient visit and documentation

## 2022-06-18 NOTE — PROGRESS NOTES
Patient discharged to home undelivered ambulatory, with belongings.  Will follow up with CNM in clinic in 2 weeks.  GC UA collected and sent.  Patient will look online for results.  AVS sent with patient and questions/concerns addressed.  Alaina Brandon RN on 6/17/2022 at 8:34 PM

## 2022-06-18 NOTE — PROGRESS NOTES
S: Triage Note  B: Marley Weldon is a 46 year old  at 23w3d gestation who presents to [facility] for evaluation of brownish vaginal discharge, mild urgency with urination, no contractions noted, no bleeding. Patient is unclear if her discharge is watery or more brownish. She does have some sensitive tissue around her vulva.  A: Pt roomed, oriented to room and call light, gown placed, and monitors applied. Patient is reporting above symptoms, vaginal tissue is slightly reddened and not appearing wet or moist upon collection of swabs   R: Notified patients midwife on call and orders received for labs. Will obtain labs, and send. Midwife is on her way to the hospital and will evaluate when she is present.

## 2022-06-18 NOTE — DISCHARGE INSTRUCTIONS
Discharge Instruction for Undelivered Patients      You were seen for: Bleeding Assessment  We Consulted: Ernestine Marcum  You had (Test or Medicine): UA, wet prep, Rom Plus, UC/UG    Diet:   Drink 8 to 12 glasses of liquids (milk, juice, water) every day.  You may eat meals and snacks.     Activity:  Count fetal kicks everyday (see handout)  Call your doctor or nurse midwife if your baby is moving less than usual.     Call your provider if you notice:  Swelling in your face or increased swelling in your hands or legs.  Headaches that are not relieved by Tylenol (acetaminophen).  Changes in your vision (blurring: seeing spots or stars.)  Nausea (sick to your stomach) and vomiting (throwing up).   Weight gain of 5 pounds or more per week.  Heartburn that doesn't go away.  Signs of bladder infection: pain when you urinate (use the toilet), need to go more often and more urgently.  The bag of benavides (rupture of membranes) breaks, or you notice leaking in your underwear.  Bright red blood in your underwear.  Abdominal (lower belly) or stomach pain.  For first baby: Contractions (tightening) less than 5 minutes apart for one hour or more.  Second (plus) baby: Contractions (tightening) less than 10 minutes apart and getting stronger.  *If less than 34 weeks: Contractions (tightening) more than 6 times in one hour.  Increase or change in vaginal discharge (note the color and amount)  Other:    Follow-up:  As scheduled in the clinic

## 2022-06-19 LAB — BACTERIA UR CULT: NO GROWTH

## 2022-06-28 NOTE — PROGRESS NOTES
Marley presents to [facility] clinic for a routine prenatal visit at 26w1d. Feeling well in general.  Has OB consult with Dr. Black on 22   NV 1 hr. GCT, hgb, RPR. Tdap for fetal protection of pertussis.  Reports normal fetal movements. Discussed [facility]. Denies PTL including, regular painful contractions, bleeding, leaking or changes in fetal movement.   Reviewed  labor precautions, warning signs and when/how to call the on-call CNM.   Offers no questions or concerns.   Questions about: issues with sleeping--wakes up at 4 am or 5 am and can't get back to sleep.    COVID-19 in pregnancy discussed. Pt is vaccinated x 2, booster due but declined today. Informed CDC recommends COVID-19 vaccines for pregnant women, a recommendation that is in line with that of ACOG and SMFM (Society of Maternal Fetal Medicine). Pt acknowledges recommendation but declines until postpartum.She is washing her hands frequently, and limiting social contact to avoid exposure from coronavirus, wearing a mask when in public.   Pt. is not able to work from home ( on [employer]'s).     Reviewed our recommendation that she take an iron supplement daily to boost her iron stores prior to birth as we anticipate ashortage of blood products due to COVID19 pandemic.   Pt. is currently not supplementing daily; Rx for Slow Fe given. discussed strategies to manage constipation.     NV: plan RTC in 2 wks for 28 wk labs and PNV

## 2022-07-06 ENCOUNTER — PRENATAL OFFICE VISIT (OUTPATIENT)
Dept: MIDWIFE SERVICES | Facility: CLINIC | Age: 47
End: 2022-07-06
Payer: COMMERCIAL

## 2022-07-06 VITALS
HEART RATE: 95 BPM | HEIGHT: 64 IN | DIASTOLIC BLOOD PRESSURE: 60 MMHG | WEIGHT: 167 LBS | SYSTOLIC BLOOD PRESSURE: 108 MMHG | OXYGEN SATURATION: 97 % | BODY MASS INDEX: 28.51 KG/M2

## 2022-07-06 DIAGNOSIS — O09.529 SUPERVISION OF HIGH-RISK PREGNANCY OF ELDERLY MULTIGRAVIDA: ICD-10-CM

## 2022-07-06 DIAGNOSIS — O09.529 HIGH-RISK PREGNANCY, ELDERLY MULTIGRAVIDA, UNSPECIFIED TRIMESTER: Primary | ICD-10-CM

## 2022-07-06 PROCEDURE — 99207 PR PRENATAL VISIT: CPT | Performed by: MIDWIFE

## 2022-07-06 RX ORDER — MAGNESIUM CARB/ALUMINUM HYDROX 105-160MG
296 TABLET,CHEWABLE ORAL ONCE
COMMUNITY
End: 2022-10-18

## 2022-07-12 ENCOUNTER — OFFICE VISIT (OUTPATIENT)
Dept: OBGYN | Facility: CLINIC | Age: 47
End: 2022-07-12
Payer: COMMERCIAL

## 2022-07-12 VITALS
WEIGHT: 163 LBS | SYSTOLIC BLOOD PRESSURE: 112 MMHG | BODY MASS INDEX: 27.83 KG/M2 | HEIGHT: 64 IN | DIASTOLIC BLOOD PRESSURE: 64 MMHG

## 2022-07-12 DIAGNOSIS — O34.219 H/O CESAREAN SECTION COMPLICATING PREGNANCY: Primary | ICD-10-CM

## 2022-07-12 DIAGNOSIS — O09.522 MULTIGRAVIDA OF ADVANCED MATERNAL AGE IN SECOND TRIMESTER: ICD-10-CM

## 2022-07-12 PROCEDURE — 99214 OFFICE O/P EST MOD 30 MIN: CPT | Performed by: OBSTETRICS & GYNECOLOGY
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
## 2022-07-15 NOTE — PROGRESS NOTES
Marley presents to [facility] clinic for a routine prenatal visit at 28w1d. Feeling well overall!  Staying busy this summer with her daughter before she starts  in the fall.  Continues to work as a  at [employer].  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  Reports normal fetal movements.  Denies  labor symptoms, bleeding, leaking.  Weight gain continues to be low.  Total weight gain in the pregnancy 5 pounds.  Admits while she does have some cravings, her appetite is slightly lower than she would expect.  She does eat a normal breakfast and lunch but has a small dinner.  Dinners tend to be chaotic and stressful for the family as they are mostly focusing on getting her daughter to eat.  Eats a piece of fruit for snacks.  Recommended trying to prioritize protein and trying to get 3 meals and 2 snacks in each day, including a source of protein with each.  Marley states her goal is to try to gain 5 pounds within the next month.  She admits this is a much different weight gain pattern and she experienced with her daughter, where she gained over 70 pounds.    Marley saw Dr. Black last week for  consultation.  After the visit she has had some time to consider her options and desires to proceed with a repeat  section.  This writer will communicate with Dr. Black patient's decision and request Dr. Black schedule the surgery and notify patient. Will likely plan a preoperative visit with Dr. Black prior to her surgery.  Encouraged routine midwifery visits in addition to this.    COVID-19 in pregnancy discussed and COVID mitigation efforts urged. Pt is vaccinated x 2, due for booster but declines.  Discussed safety of COVID vaccinations in pregnancy. Reviewed COVID19 hospital policies, SARS-CoV-2 PCR testing pre procedure.     Reviewed our recommendation that she take an iron supplement daily to boost her iron stores prior to  birth as we anticipate ashortage of blood products due to COVID19 pandemic.  Currently Marley is not supplementing with oral iron but has some at home.  Understands this is the current recommendation and we will encourage it further depending on her hemoglobin level today.    Completing 1 hour GCT, hemoglobin, RPR today.  Accepts Tdap for fetal protection of pertussis.   Reviewed  labor precautions, warning signs and when/how to call the on-call CNM.   Growth ultrasound scheduled for 32 weeks.        NV: plan RTC in 4 wks if labs normal

## 2022-07-20 ENCOUNTER — PRENATAL OFFICE VISIT (OUTPATIENT)
Dept: MIDWIFE SERVICES | Facility: CLINIC | Age: 47
End: 2022-07-20
Payer: COMMERCIAL

## 2022-07-20 ENCOUNTER — MYC MEDICAL ADVICE (OUTPATIENT)
Dept: MIDWIFE SERVICES | Facility: CLINIC | Age: 47
End: 2022-07-20

## 2022-07-20 VITALS
HEART RATE: 76 BPM | BODY MASS INDEX: 28.49 KG/M2 | DIASTOLIC BLOOD PRESSURE: 60 MMHG | WEIGHT: 166 LBS | SYSTOLIC BLOOD PRESSURE: 118 MMHG

## 2022-07-20 DIAGNOSIS — O09.529 SUPERVISION OF HIGH-RISK PREGNANCY OF ELDERLY MULTIGRAVIDA: Primary | ICD-10-CM

## 2022-07-20 DIAGNOSIS — O99.012 ANEMIA AFFECTING PREGNANCY IN SECOND TRIMESTER: Primary | ICD-10-CM

## 2022-07-20 DIAGNOSIS — O99.810 ABNORMAL GLUCOSE AFFECTING PREGNANCY: ICD-10-CM

## 2022-07-20 DIAGNOSIS — O99.019 ANEMIA DURING PREGNANCY: ICD-10-CM

## 2022-07-20 PROBLEM — Z36.89 ENCOUNTER FOR TRIAGE IN PREGNANT PATIENT: Status: RESOLVED | Noted: 2022-06-17 | Resolved: 2022-07-20

## 2022-07-20 LAB
ERYTHROCYTE [DISTWIDTH] IN BLOOD BY AUTOMATED COUNT: 14.3 % (ref 10–15)
GLUCOSE 1H P 50 G GLC PO SERPL-MCNC: 163 MG/DL (ref 70–129)
HCT VFR BLD AUTO: 31 % (ref 35–47)
HGB BLD-MCNC: 10.6 G/DL (ref 11.7–15.7)
HGB BLD-MCNC: 10.6 G/DL (ref 11.7–15.7)
MCH RBC QN AUTO: 30.5 PG (ref 26.5–33)
MCHC RBC AUTO-ENTMCNC: 34.2 G/DL (ref 31.5–36.5)
MCV RBC AUTO: 89 FL (ref 78–100)
PLATELET # BLD AUTO: 341 10E3/UL (ref 150–450)
RBC # BLD AUTO: 3.48 10E6/UL (ref 3.8–5.2)
T PALLIDUM AB SER QL: NONREACTIVE
WBC # BLD AUTO: 9.4 10E3/UL (ref 4–11)

## 2022-07-20 PROCEDURE — 90715 TDAP VACCINE 7 YRS/> IM: CPT | Performed by: ADVANCED PRACTICE MIDWIFE

## 2022-07-20 PROCEDURE — 99207 PR PRENATAL VISIT: CPT | Performed by: ADVANCED PRACTICE MIDWIFE

## 2022-07-20 PROCEDURE — 86780 TREPONEMA PALLIDUM: CPT | Performed by: ADVANCED PRACTICE MIDWIFE

## 2022-07-20 PROCEDURE — 36415 COLL VENOUS BLD VENIPUNCTURE: CPT | Performed by: ADVANCED PRACTICE MIDWIFE

## 2022-07-20 PROCEDURE — 85027 COMPLETE CBC AUTOMATED: CPT | Performed by: ADVANCED PRACTICE MIDWIFE

## 2022-07-20 PROCEDURE — 82950 GLUCOSE TEST: CPT | Performed by: ADVANCED PRACTICE MIDWIFE

## 2022-07-20 PROCEDURE — 90471 IMMUNIZATION ADMIN: CPT | Performed by: ADVANCED PRACTICE MIDWIFE

## 2022-07-20 RX ORDER — LANOLIN ALCOHOL/MO/W.PET/CERES
1000 CREAM (GRAM) TOPICAL DAILY
Qty: 30 TABLET | Refills: 4 | Status: SHIPPED | OUTPATIENT
Start: 2022-07-20 | End: 2022-10-11

## 2022-07-20 RX ORDER — MULTIVIT WITH MINERALS/LUTEIN
250 TABLET ORAL DAILY
Qty: 30 TABLET | Refills: 4 | Status: SHIPPED | OUTPATIENT
Start: 2022-07-20 | End: 2022-10-11

## 2022-07-20 RX ORDER — FERROUS SULFATE 325(65) MG
325 TABLET ORAL
Qty: 30 TABLET | Refills: 4 | Status: SHIPPED | OUTPATIENT
Start: 2022-07-20 | End: 2022-10-11

## 2022-07-20 NOTE — NURSING NOTE
Prior to immunization administration, verified patients identity using patient s name and date of birth. Please see Immunization Activity for additional information.     Screening Questionnaire for Adult Immunization    Are you sick today?   No   Do you have allergies to medications, food, a vaccine component or latex?   No   Have you ever had a serious reaction after receiving a vaccination?   No   Do you have a long-term health problem with heart, lung, kidney, or metabolic disease (e.g., diabetes), asthma, a blood disorder, no spleen, complement component deficiency, a cochlear implant, or a spinal fluid leak?  Are you on long-term aspirin therapy?   No   Do you have cancer, leukemia, HIV/AIDS, or any other immune system problem?   No   Do you have a parent, brother, or sister with an immune system problem?   No   In the past 3 months, have you taken medications that affect  your immune system, such as prednisone, other steroids, or anticancer drugs; drugs for the treatment of rheumatoid arthritis, Crohn s disease, or psoriasis; or have you had radiation treatments?   No   Have you had a seizure, or a brain or other nervous system problem?   No   During the past year, have you received a transfusion of blood or blood    products, or been given immune (gamma) globulin or antiviral drug?   No   For women: Are you pregnant or is there a chance you could become       pregnant during the next month?   Yes   Have you received any vaccinations in the past 4 weeks?   No     Immunization questionnaire was positive for at least one answer.  Notified Treva Lopez CNM.        Per orders of Treva Lopez, injection of TDaP given by Sp Lott CMA. Patient instructed to remain in clinic for 15 minutes afterwards, and to report any adverse reaction to me immediately.       Screening performed by Sp Lott CMA on 7/20/2022 at 11:55 AM.

## 2022-07-20 NOTE — PATIENT INSTRUCTIONS
"[facility] Nurse Midwives [facility]  - Contact information:  Appointment line and to get a hold of CNM in clinic Monday-Friday 8 am - 5 pm:  [phone].  There are some clinics with early start times (1st appointment 7:40 am) and others with evening hours (last appointment 6:20 pm).  Most are typically open from 8 am to 5 pm.    CNM on call answering service: [phone].  Specify your hospital of choice and leave a brief message for CNM;  will then page CNM who is on call at your specified hospital and you should receive a call back with 15 minutes.  Be sure that your ringer is audible and that you can accept blocked calls so that we can get back in touch with you! This number should be reserved for urgent needs if during the day, before 8 am, after 5 pm, weekends, holidays.    Contact the on-call CNM with warning signs, such as:  vaginal bleeding   Vaginal discharge and itching or pain and burning during urination  Leg/calf pain or swelling on one side  severe abdominal pain  nausea and vomiting more than 4-5 times a day, or if you are unable to keep anything down  fever more than 100.4 degrees F.     [place]  After each of your visits you are welcome to check Musikki for your visit summary including education and links to information relevant to your pregnancy and/or well woman care.   Find the \"Visits\" tab at the top of the page, you will see a list of recent visits and for each visit a for link for \"View After Visit Summary.\" View of your After Visit Summary will allow you to read our recommendations from your visit, review any education provided, and link to websites with useful information.   If you have any questions or difficulty navigating LabPixies, please feel free to contact us and we will do our best to direct you.  Meet the Midwives from [facility]  You are invited to an informational meet and greet with [facility]'s [facility] Certified Nurse-Midwives. Our " "free \"Meet the Midwives\" event is a great opportunity to learn about our midwives' philosophy and experience, the hospitals where we can assist with your birth, and answer questions you may have. Partners, friends, and family are welcome to attend. Currently, this is a virtual event.  Date  First Tuesday of every month at 7 pm.    Link to next (live) meeting  [email or web address]://[email or web address].org/classes-and-events/meet-the-midwives-from-[facility]  To Join by Telephone (audio only) Call:   [phone] Phone Conference ID: [phone]#      Touring the Maternity Care Center  At this time we are offering a virtual tour of the Maternity Care Centers at both [facility] and [facility]:   [facility] Maternity Care:   https://S*BioSnippets.org/locations/the-birthplace-[facility]:   https://Childcare Bridge.[employer]/locations/the-[facility]place-[facility]      Breastfeeding and Birth Control  How do I decide what birth control method is best for me while I am breastfeeding?  Choosing a method of birth control is very personal. First, answer the following questions:     Do you want to have more children?   How much spacing between births do you want for your children?   Do you smoke or have you had any health problems, such as liver disease or a blood clot?  Talk about the answers to each of these questions with your health care provider to help you choose the best method for you.    Can I use breastfeeding as my birth control?  Using breastfeeding as your birth control (the lactational amenorrhea method) can be a good way to keep from getting pregnant in the first months after the baby is born. Each time your baby nurses, your body releases a hormone called prolactin, which stops your body from making the hormones that cause you to ovulate (release an egg). If you are not ovulating, you cannot get pregnant.    The " lactational amenorrhea method works only if:   you have not started your period yet.   you are breastfeeding only and not giving your baby any other food or drink.   you are breastfeeding at least every 4 hours during the day and every 6 hours at night.   your baby is less than 6 months old.  When any 1 of these 4 things is not happening, you no longer have good protection from getting pregnant, and you should use another form of birth control.    What birth control methods are safe for me to use while I breastfeed?    Methods without hormones  Methods without hormones do not affect you, your baby, or your breastfeeding.  Methods without hormones that are the most effective   The copper intrauterine contraceptive device (IUD) (ParaGard) is a small, T-shaped device that is in- serted into your uterus (womb) through the vagina and cervix. The copper IUD lasts for 10 years.   Sterilization (getting your tubes tied or your partner having a vasectomy) is very effective, but it is per- manent. You should choose sterilization only if you do not want to have more children.  A method without hormones that is effective   The lactational amenorrhea method described above is effective for the first 6 months.  Methods without hormones that are less effective   Natural family planning is monitoring your body for signs of ovulation and not having sex when you think you are ovulating. This method is reliable only if you are having regular periods every month.   Barrier methods (condoms, diaphragms, sponges, and spermicides) are used at the time you have sex. These methods are effective only if you use them correctly every time.    Methods with hormones  Birth control methods that use hormones can be used while you are breastfeeding. They may have a small effect on lowering the amount of milk you make. All hormones will get into your breast milk in very small amounts, but there is no known harm to your baby from this small amount of  hormone in breast milk.only methodsmethods use only 1 hormone, called progestin. You can start them right after your baby is born or wait 4 to 6 weeks to make sure your milk supply is good.   Progestin-only pills ( minipills ): If you like to take pills every day, you can use the minipill. In order forpill to work well, you have to take 1 at the same time each day. When you stop breastfeeding, you should start pills that have both estrogen and progestin because they are better at keeping you from get- ting pregnant.   Progestin IUD (Mirena): The progestin IUD is shaped and inserted into the uterus like the copper IUD. It works for up to 5 years. Both IUDs are usually inserted 4 to 6 weeks after the baby is born.  Progestin implant (Nexplanon): The progestin implant is a small matchstick-sized flexible ethel. It is placed into the fatty tissue in the back of your arm. It works for up to 3 years.  Progestin shot (Depo-Provera): The progestin shot is given every 3 months.    estrogen and progestin methods  These methods use 2 hormones, called estrogen and progestin.     These methods increase your risk of a blood clot, which is already higher than normal after you have a baby. You should not use them until your baby is at least 6 weeks old. The combined methods are not recommended as the first choice for women who are breastfeeding. If a combined method is the one that you feel will be best for you to prevent getting pregnant, these methods are okay to use while breastfeeding.   Combined birth control pills: You take a pill each day.  Vaginal ring (NuvaRing): The ring is worn in the vagina for 3 weeks then left out for 1 week before youin a new ring.  Patch (Ortho Evra): The patch is placed on your skin and changed every week for 3 weeks then left off forweek before putting a new patch on a different area of your skin.    Pediatric Care Providers at [facility] Nurse Midwives [facility]:    Choosing the right  provider is one of the most important decisions you ll make about your health care. We can help you find the right one. Remember, you re looking for a provider you can trust and work with to improve your health and well-being, so take time to think about what you need. Depending on how complicated your health care needs are, you may need to see more than one type of provider.    Primary Care Providers: You ll see a primary care provider first for most health issues. They ll work with you to get your recommended screenings, help you manage chronic conditions, and refer you to other types of providers if you need them. Your primary care provider may be called a family physician or doctor, internist, general practitioner, nurse practitioner, or physician s assistant. Your child or teenager s provider may be called a pediatrician.  Specialists: You ll see a specialist for certain services or to treat specific conditions. Specialists include cardiologists, oncologists, psychologists, allergists, podiatrists, and orthopedists. You may need a referral from your primary care provider before you go to a specialist in order for your health plan to pay for your visit.\[place] are some tips for finding a provider where you live:  If you already have a provider you like and want to keep working with, call their office and ask if they accept your coverage.  Call your insurance company or state Medicaid and CHIP program. Look at their website or check your member handbook to find providers in your network who take your health coverage.  Ask your friends or family if they have providers they like and use these tools to compare health care providers in your area.    Family Medicine at  [facility]:     https://www.[place].org/specialties/family-medicine    Many of our families enjoy all seeing the same doctor, who comes to know the whole family very well. We base our practice on the knowledge of the  patient in the context of family and community.  WHY CHOOSE A FAMILY MEDICINE PHYSICIAN?  Ability of the whole family to see the same doctor  Focus on the whole person, including physical and emotional health  A personal relationship with their doctor that is nurtured over time  Respect for individual and family beliefs and values  No need to change primary care providers when a certain age is reached  Coordination of care when other health care services are needed    Pediatrics at  [facility]:   [email or web address]://[email or web address]/specialties/pediatric-care    Through a teaching affiliation with the [facility], [facility] staff keeps current on new developments in the field of pediatrics.     Everything we do centers around caring for children. We place special emphasis on wellness and prevention.pediatric care team includes a team of pediatricians and certified nurse practitioners who provide care to pediatric and adolescent patients ages 0 to 18, and some up to the age of 26. We offer preventive health maintenance for healthy children as well the diagnosis and treatment of common and chronic illnesses and injuries. In addition, we also offer several pediatric specialists who focus on adolescent health issues and developmental and behavioral issues.    Circumcision    Educational video:     [email or web address]    What is circumcision?  At birth, baby boys have loose skin that covers the head of the penis. This skin is called the foreskin. When all or part of the foreskin of the penis is cut off, this is called circumcision.  Why is circumcision done?  Circumcision is done for many reasons including Latter day, cultural, looks, and health. Some Latter day groups circumcise all boys as a ayala-based practice. Many people in the [place] choose to circumcise their baby boys because they believe it is culturally normal.  It is not a common practice in [place], [place], or [place].  Some parents choose circumcision so that their son will have a penis that looks like his father s if the father was also circumcised. Other people choose circumcision because they believe it is  or will protect the boy or man from infection or cancer later in life.  Does circumcision protect against infection or cancer?  Circumcision does seem to protect against some types of infection or cancer. Cancer of the penis is one type of cancer that circumcision may prevent. However, cancer of the penis is very rare. One hundred thousand circumcisions would need to be done to prevent one case of cancer of the penis. Circumcision may also decrease the chance of some sexually transmitted infections, such as HIV and human papilloma virus (HPV). See the next page for more information on the risks and benefits of circumcision.  What happens during a circumcision?  Babies born in the hospital are usually circumcised before they go home. Health care providers also perform circumcisions in their offices and clinics within a few weeks after birth.  Confucianism circumcisions are most often done at home or in a Sabianist.  Before the circumcision is performed, some providers give an injection (shot) of a small amount of anesthetic (numbing medicine) at the base of the penis to block the pain or put an anesthetic cream on the penis to numb the area that will be cut.  There are 2 different ways to do a circumcision. In one type, a clamp placed around the head of the penis cuts off the blood supply to the foreskin, and the foreskin above the clamp is cut off. The clamp is left on the penis until the area heals and it falls off a few days later. In another type of circumcision, the foreskin is cut off with scissors or a scalpel.  After the circumcision, petroleum jelly and sometimes gauze may be put over the area of the penis where the skin was removed. This protects  the end of the penis while it heals.  Can I keep my son s penis  if it is circumcised?  Regular washing with soap and water will keep any penis clean. Circumcision does not make the penis . Uncircumcised boys do need to be taught to clean beneath their foreskin, just like they need to be taught to wash their hands or brush their teeth.  How do I decide if I should have my son circumcised?  The American Academy of Pediatrics (AAP) says that  circumcision may have health benefits. They do not recommend circumcision for all boys as a routine procedure. The AAP recommends that you talk to your health care provider to decide if circumcision is the right choice for your family. You may also wish to discuss the question with your family or .  What are the risks and benefits of circumcision?  We do not have a lot of good scientific information about the health risks or benefits of circumcision.  Possible Risks:  Very few baby boys (less than 1 in 100) will have a problem after circumcision, such as bleeding or mild infection of the penis. These problems are usually not serious and are easy to treat.  Less common problems are:   Removal of too much or too little foreskin  Some rare problems are:   Narrowing of the opening of the penis, which can cause problems with urination   Removal of part of the penis or death of some of the other skin on the penis  Infection that spreads to other parts of the body  People used to think babies did not really feel pain. Now we know that they do. Many baby boys appear to feel a lot of pain during circumcision if anesthesia is not used.  We do not know if circumcision affects sexual function or sensation.  Possible Benefits:   Less risk for some kinds of cancers, like cancer of the penis   Fewer urinary tract (bladder or kidney) infections for babies   Less risk for some sexually transmitted infections, such as HIV, herpes, and HPV    May protect female  sexual partners from some sexually transmitted infections    For More Information  American Academy of Family Physicians: Circumcision  http://familydoctor.org/familydoctor/en/pregnancy-newborns/caring-for-newborns/infant- care/circumcision.html  MedlinePlus: Circumcision (includes a slide show on the procedure)  www.nlm.nih.gov/medlineplus/circumcision.html  American Academy of Pediatrics: Policy statement on circumcision  Http://pediatrics.aappublications.org/content/130/3/e756.abstract      Childbirth and Parenting Education:         Everyday Miracles:   https://www.everyday-miracles.org/    Free Video Series from [facility]: https://nursing.[facility]/academics/specialty-areas/nurse-midwifery/having-baby-prenatal-videos/having-baby-prenatal-and    TEENA parenting [place]: http://[facility] Health[facility] Therapy.[employer]/   (110) 332-MNTO  [place]: (education, yoga & wellness) www.Virtual Telephone & Telegraph  Enlightened Mama: www.enlightenedmama.[employer]   Childbirth collective: (Parent topic nights)  www.childbirthcollective.org/  Hypnobabies:  www.hypnobabiestNexantcities.com/  Hypnobirthing:  Http://hypnobirthing.[employer]/  The Birth Hour: https://MommyCoach/online-childbirth-class/    APPS and Podcasts:   Rylan Marsh Nurture    Evidence Based Birth  The Birth Hour (for birth stories)   Birthful   Expectful   The Longest Shortest Time  PregnancyPodcast Di Kothari    Book Recommendations:   Shawna [place]'s Birthing From Within--first few chapters include a new-age tone, you may prefer to skip it and keep going, because there is good stuff later.  This book recommendation covers emotional preparation, but does cover coping with pain, and use of both pharmacological and nonpharmacological methods.    Dr. Goldstein' The Pregnancy Book and The Birth Book--the pregnancy book goes month-by month      The Birth Partner by Alexa Mckeon    Womanly Art of Breastfeeding by La Leche League International   Bestfeeding by Eden Payne--great  "pictures    Mothering Your Nursing Toddler, by Hilda Benton.   Addresses dealing with so many of the challenging behaviors of a nursing toddler.  How Weaning Happens, by La Leche League.  Discusses weaning at all ages, from medically necessary weaning of an infant, all the way up to age 5 (or older), with why/why not, and strategies.  Very empowering book both for deciding to wean and deciding not to.    American College of Nurse-Midwives (ACNM) http://www.midwife.org/; look at the informational handouts at http://www.midwife.org/Share-With-Women     www.mymidwife.org    Mother to Baby (Medication and Herbal guidance in pregnancy): http://www.mothertobaby.org  Toll-Free Hotline: 932.394.5592  LactMed (Medication use while breastfeeding): http://toxnet.nlm.nih.gov/newtoxnet/lactmed.htm    Women's Health.gov:  http://www.womenshealth.gov/a-z-topics/index.html    American pregnancy association - http://americanpregnancy.org    Centering Pregnancy (group prenatal care option): http://centeringhealthcare.org    Information about doulas:  Childbirth collective: http://www.childbirthcollective.org/  Doulas of [place] (ABRAHAM):  www.abraham.org  [facility]  project: http://twincitiesdoulaproject.com/     Early Childhood and Family Education (ECFE):  ECFE offers parents hands-on learning experiences that will nourish a lifetime of teachable moments.  http://ecfe.info/ecfe-home/    March of Dimes www.marchofdimes.com     FDA - Nutrition  www.mypyramid.gov  Under \"For Consumers,\" click on \"pregnant and breastfeeding women.\"      Centers for Disease Control and Prevention (CDC) - Vaccines : http://www.cdc.gov/vaccines/       When researching information on the web, question the validity of websites.  The domains .gov, .edu and.org tend to be more reliable information.  If there are a lot of advertisements, be cautious of the information provided. Stay away from blogs and chat rooms please!             Virtual " "Breastfeeding Support:    During this time of isolation, breastfeeding families need even more community!  Here are some area organizations offering virtual support groups for breastfeeding:    [place] [employer] Support Group,  at 10:30 am   Run by CRISTOBAL Han of [employer]   Go to [employer] Facebook page and click on \"events\" for link   https://www.Allocadia.com/events/814565094520131/  [facility] Milk Hour,  at 2:30 pm    Run by CRISTOBAL Tamez   Go to [facility] FB page and send message to get link   https://www.Allocadia.[employer]Imcompanyund[facility]/  [facility] holding virtual meetings the first Tuesday of each month, 8-9 pm, and the   Third Saturday, 10 - 11 am.  Go to [facility] and [place] FB page; message to get link https://www.Allocadia.[employer]LLLofGoldChantell/?hc_location=[facility] offers a Lactation Lounge every Friday 12pm - 1pm, run by Louisa Rashid Leader   Sign up via link at [employer]/cbe-lactation   https://www.Melon Power/cbe-lactation  [facility] is offering virtual support groups every Monday, 10:30 am - 12 pm, run by nurse IBCLC   Https://www.facebook.com/events/121664489719162/    Prenatal Breastfeeding Classes:      [facility] is offering virtual breastfeeding and  care classes:  https://www.Melon Power/education-workshops  BirthEd childbirth and breastfeeding education offering virtual prenatal breastfeeding classes  https://www.birthedmn.com/workshops    "

## 2022-07-21 ENCOUNTER — PREP FOR PROCEDURE (OUTPATIENT)
Dept: OBGYN | Facility: CLINIC | Age: 47
End: 2022-07-21

## 2022-07-21 ENCOUNTER — TELEPHONE (OUTPATIENT)
Dept: MIDWIFE SERVICES | Facility: CLINIC | Age: 47
End: 2022-07-21

## 2022-07-21 DIAGNOSIS — O34.219 H/O CESAREAN SECTION COMPLICATING PREGNANCY: Primary | ICD-10-CM

## 2022-07-21 NOTE — TELEPHONE ENCOUNTER
Left message to call back  Will send MyChart Message also.    Received MyChart Message from Patient  Need to clarify her results from her 28 week labs.  One message from a midwife says she failed a 3 hour GTT and has gestational diabetes, when another message from another midwife talks about her 1 hour glucose being elevated and has the need for a three hour GTT as well as her low hgb.

## 2022-07-21 NOTE — TELEPHONE ENCOUNTER
"PHONE NOTE: Called patient and no answer so left message to call back.  Then this midwife called patient at 9:40 AM to discuss results and patient answered.  Patient had a lot of questions about the 1 hour glucose GCT and also about the 3-hour GTT.  Patient is needle phobic and concerned about the pain of the 4 needlesticks that she will need to have for this test.  We talked about it and she seems somewhat reassured.  She agrees to call and make a \"lab only\" appointment for sometime this week.  She understands that the 1 hour glucose test was not a fasting test and it was just a screening test, but that the 3-hour GTT is a fasting test and a diagnostic test.  Recommended trying not to worry about it until we have the results and we will take it from there.  If she is diagnosed with GDM she will have support and education.  Encouraged to ask questions along the way.  Patient has phone numbers and knows how to get ahold of us.  No further questions.  "

## 2022-07-21 NOTE — TELEPHONE ENCOUNTER
Xochitl Shrestha CNM called and spoke to patient regarding 1 hour glucose lab result and need for fasting 3 hour GTT  CF also sent her a MyChart Message

## 2022-07-23 ENCOUNTER — HEALTH MAINTENANCE LETTER (OUTPATIENT)
Age: 47
End: 2022-07-23

## 2022-07-28 ENCOUNTER — LAB (OUTPATIENT)
Dept: LAB | Facility: CLINIC | Age: 47
End: 2022-07-28
Payer: COMMERCIAL

## 2022-07-28 ENCOUNTER — TELEPHONE (OUTPATIENT)
Dept: MIDWIFE SERVICES | Facility: CLINIC | Age: 47
End: 2022-07-28

## 2022-07-28 DIAGNOSIS — O99.810 ABNORMAL GLUCOSE AFFECTING PREGNANCY: ICD-10-CM

## 2022-07-28 DIAGNOSIS — O09.529 SUPERVISION OF HIGH-RISK PREGNANCY OF ELDERLY MULTIGRAVIDA: ICD-10-CM

## 2022-07-28 DIAGNOSIS — O24.419 GESTATIONAL DIABETES MELLITUS (GDM) IN SECOND TRIMESTER, GESTATIONAL DIABETES METHOD OF CONTROL UNSPECIFIED: Primary | ICD-10-CM

## 2022-07-28 DIAGNOSIS — O99.019 ANEMIA DURING PREGNANCY: ICD-10-CM

## 2022-07-28 LAB
FERRITIN SERPL-MCNC: 31 NG/ML (ref 6–175)
GESTATIONAL GTT 1 HR POST DOSE: 202 MG/DL (ref 60–179)
GESTATIONAL GTT 2 HR POST DOSE: 200 MG/DL (ref 60–154)
GESTATIONAL GTT 3 HR POST DOSE: 150 MG/DL (ref 60–139)
GLUCOSE P FAST SERPL-MCNC: 94 MG/DL (ref 60–94)
IRON BINDING CAPACITY (ROCHE): 463 UG/DL (ref 240–430)
IRON SATN MFR SERPL: 17 % (ref 15–46)
IRON SERPL-MCNC: 81 UG/DL (ref 37–145)

## 2022-07-28 PROCEDURE — 82951 GLUCOSE TOLERANCE TEST (GTT): CPT

## 2022-07-28 PROCEDURE — 82952 GTT-ADDED SAMPLES: CPT

## 2022-07-28 PROCEDURE — 83540 ASSAY OF IRON: CPT

## 2022-07-28 PROCEDURE — 83550 IRON BINDING TEST: CPT

## 2022-07-28 PROCEDURE — 82728 ASSAY OF FERRITIN: CPT

## 2022-07-28 PROCEDURE — 36415 COLL VENOUS BLD VENIPUNCTURE: CPT

## 2022-07-28 NOTE — TELEPHONE ENCOUNTER
Pt would like a CNM to call and discuss the results she has received so far for glucose test. Please call her at [phone]

## 2022-07-29 ENCOUNTER — ALLIED HEALTH/NURSE VISIT (OUTPATIENT)
Dept: EDUCATION SERVICES | Facility: CLINIC | Age: 47
End: 2022-07-29
Payer: COMMERCIAL

## 2022-07-29 DIAGNOSIS — O24.419 GESTATIONAL DIABETES MELLITUS (GDM) IN SECOND TRIMESTER, GESTATIONAL DIABETES METHOD OF CONTROL UNSPECIFIED: ICD-10-CM

## 2022-07-29 PROCEDURE — G0108 DIAB MANAGE TRN  PER INDIV: HCPCS | Mod: AE

## 2022-07-29 NOTE — TELEPHONE ENCOUNTER
TC:  Called patient back, discussed GDM diagnosis, encouraged diabetes education visit within coming week, and urgent order placed. Ordered blood sugar monitoring supplies to preferred pharmacy and encouraged to  prior to diabetes education visit and bring with her.  Discussed importance of good blood sugar control for health of pregnancy, increased risk for type II diabetes later in life impacted by changes started now, fetal risks with uncontrolled blood sugars, continued care by CNMs if blood sugars can be controlled with diet, and need to transfer care to OB/GYN if medication needed to control blood sugars.  Also discussed TULIO, iron studies show iron low normal, body searching for more iron.  Taking iron, Vit C and Vit B12 every other day.  Considering increasing to daily. Discussed iron absorption and use and what can help and prevent this (e.g. calcium binding to iron).   Verbalized understanding of all of the above and no further questions at this time.  Will reach out as needed/desired.    .TAY Vigil CNM CLC  7/28/2022 8:19 PM

## 2022-07-29 NOTE — PROGRESS NOTES
Diabetes Self-Management Education & Support      SUBJECTIVE/OBJECTIVE:  Presents for education related to gestational diabetes.      Cultural Influences/Ethnic Background:  Not  or       Estimated Date of Delivery: Oct 11, 2022 - c/s 10/5/22  Preg #3, 1 misc, 6 Y/o daughter   Previous GDM: no     1 hour OGTT  Lab Results   Component Value Date    GLU1 163 (H) 07/20/2022         3 hour OGTT    Fasting  Lab Results   Component Value Date    GTTGF 94 07/28/2022       1 hour  Lab Results   Component Value Date    GTTG1 202 (H) 07/28/2022       2 hour  Lab Results   Component Value Date    GTTG2 200 (H) 07/28/2022       3 hour  Lab Results   Component Value Date    GTTG3 150 (H) 07/28/2022         ASSESSMENT:  Pt seen today for initial visit.     INTERVENTION:  Patient was instructed on Accu-Chek Guide meter and was able to provide an accurate return demonstration. Patient's blood glucose reading today was 122 mg/dL about 1 hour after breakfast.    Educational topics covered today:  GDM diagnosis, pathophysiology, Risks and Complications of GDM, Means of controlling GDM, Using a Blood Glucose Monitor, Blood Glucose Goals, Logging and Interpreting Glucose Results, Ketone Testing, When to Call a Diabetes Educator or OB Provider, Healthy Eating During Pregnancy, Counting Carbohydrates, Meal Planning for GDM, and Physical Activity    Educational materials provided today:   Maki Understanding Gestational Diabetes  GDM Log Book  Sharps Disposal  Care After Delivery  Accu-Chek Guide meter kit    Pt verbalized understanding of concepts discussed and recommendations provided today.     PLAN:  Check glucose 4 times daily, before breakfast and 1 hour after each meal.     Check Ketones daily for one week, if negative, reduce testing to once a week.     Physical activity recommended: walking after meals .    Meal plan: 30 carbs at breakfast, 60 carbs at lunch, 60 carbs at supper, 30 carbs at 3 snacks a  day.  Follow consistent CHO meal plan, eat CHO and protein/fat at all meals/snacks.    Call/e-mail/MyChart message diabetes educator if 3 or more blood sugars are above the goal in 1 week, if ketones are positive, or with questions/concerns.      Time Spent: 60 minutes  Encounter Type: Individual    Any diabetes medication dose changes were made via the CDE Protocol and Collaborative Practice Agreement with the patient's referring provider. A copy of this encounter was shared with the provider.

## 2022-07-29 NOTE — LETTER
7/29/2022         RE: Marley Weldon  [address]  [facility] [address]        Dear Colleague,    Thank you for referring your patient, Marley Weldon, to the [facility]. Please see a copy of my visit note below.    Diabetes Self-Management Education & Support      SUBJECTIVE/OBJECTIVE:  Presents for education related to gestational diabetes.      Cultural Influences/Ethnic Background:  Not  or       Estimated Date of Delivery: Oct 11, 2022 - c/s 10/5/22  Preg #3, 1 misc, 6 Y/o daughter   Previous GDM: no     1 hour OGTT  Lab Results   Component Value Date    GLU1 163 (H) 07/20/2022         3 hour OGTT    Fasting  Lab Results   Component Value Date    GTTGF 94 07/28/2022       1 hour  Lab Results   Component Value Date    GTTG1 202 (H) 07/28/2022       2 hour  Lab Results   Component Value Date    GTTG2 200 (H) 07/28/2022       3 hour  Lab Results   Component Value Date    GTTG3 150 (H) 07/28/2022         ASSESSMENT:  Pt seen today for initial visit.     INTERVENTION:  Patient was instructed on Accu-Chek Guide meter and was able to provide an accurate return demonstration. Patient's blood glucose reading today was 122 mg/dL about 1 hour after breakfast.    Educational topics covered today:  GDM diagnosis, pathophysiology, Risks and Complications of GDM, Means of controlling GDM, Using a Blood Glucose Monitor, Blood Glucose Goals, Logging and Interpreting Glucose Results, Ketone Testing, When to Call a Diabetes Educator or OB Provider, Healthy Eating During Pregnancy, Counting Carbohydrates, Meal Planning for GDM, and Physical Activity    Educational materials provided today:   [employer] Understanding Gestational Diabetes  GDM Log Book  Sharps Disposal  Care After Delivery  Accu-Chek Guide meter kit    Pt verbalized understanding of concepts discussed and recommendations provided today.     PLAN:  Check glucose 4 times daily, before breakfast and 1 hour after each  meal.     Check Ketones daily for one week, if negative, reduce testing to once a week.     Physical activity recommended: walking after meals .    Meal plan: 30 carbs at breakfast, 60 carbs at lunch, 60 carbs at supper, 30 carbs at 3 snacks a day.  Follow consistent CHO meal plan, eat CHO and protein/fat at all meals/snacks.    Call/e-mail/MyChart message diabetes educator if 3 or more blood sugars are above the goal in 1 week, if ketones are positive, or with questions/concerns.      Time Spent: 60 minutes  Encounter Type: Individual    Any diabetes medication dose changes were made via the CDE Protocol and Collaborative Practice Agreement with the patient's referring provider. A copy of this encounter was shared with the provider.

## 2022-08-03 ENCOUNTER — PRENATAL OFFICE VISIT (OUTPATIENT)
Dept: MIDWIFE SERVICES | Facility: CLINIC | Age: 47
End: 2022-08-03
Payer: COMMERCIAL

## 2022-08-03 VITALS
OXYGEN SATURATION: 99 % | HEART RATE: 94 BPM | HEIGHT: 64 IN | WEIGHT: 169 LBS | SYSTOLIC BLOOD PRESSURE: 108 MMHG | BODY MASS INDEX: 28.85 KG/M2 | DIASTOLIC BLOOD PRESSURE: 62 MMHG

## 2022-08-03 DIAGNOSIS — O99.810 ABNORMAL GLUCOSE AFFECTING PREGNANCY: ICD-10-CM

## 2022-08-03 DIAGNOSIS — O09.529 SUPERVISION OF HIGH-RISK PREGNANCY OF ELDERLY MULTIGRAVIDA: ICD-10-CM

## 2022-08-03 DIAGNOSIS — O09.529 HIGH-RISK PREGNANCY, ELDERLY MULTIGRAVIDA, UNSPECIFIED TRIMESTER: Primary | ICD-10-CM

## 2022-08-03 DIAGNOSIS — O24.410 DIET CONTROLLED GESTATIONAL DIABETES MELLITUS (GDM), ANTEPARTUM: ICD-10-CM

## 2022-08-03 PROCEDURE — 99207 PR PRENATAL VISIT: CPT | Performed by: MIDWIFE

## 2022-08-03 RX ORDER — CHOLECALCIFEROL (VITAMIN D3) 50 MCG
2 TABLET ORAL DAILY
Qty: 60 TABLET | Refills: 1 | Status: SHIPPED | OUTPATIENT
Start: 2022-08-03 | End: 2022-10-11

## 2022-08-03 NOTE — PROGRESS NOTES
"Marley presents to [facility] clinic for a routine prenatal visit at 30w1d. Feeling well.  Discussion of supplements, gave new Rx for vit D 3.  Plans repeat C-sec with Dr. Black on 10/5/22  Reports normal fetal movements. Discussed [facility].   Denies regular painful contractions, bleeding, leaking, changes in fetal movement.     Reviewed 28 week labs. Taking supplemental iron  Questions: GDM record reviewed: some fasting highs and always explained by diet. Pt has been keeping track since Friday. \"I'm learning a lot\", \" I feel so much better when I eat this way.\"    Pregnancy checklist addressed.   Reviewed  labor precautions, warning signs and when/how to call the on-call CNM.   RTC 2 weeks. NV repeat Hgb and iron studies  "

## 2022-08-04 ENCOUNTER — TELEPHONE (OUTPATIENT)
Dept: MIDWIFE SERVICES | Facility: CLINIC | Age: 47
End: 2022-08-04

## 2022-08-05 ENCOUNTER — ALLIED HEALTH/NURSE VISIT (OUTPATIENT)
Dept: EDUCATION SERVICES | Facility: CLINIC | Age: 47
End: 2022-08-05
Payer: COMMERCIAL

## 2022-08-05 DIAGNOSIS — O24.410 DIET CONTROLLED GESTATIONAL DIABETES MELLITUS (GDM) IN THIRD TRIMESTER: Primary | ICD-10-CM

## 2022-08-05 PROCEDURE — G0108 DIAB MANAGE TRN  PER INDIV: HCPCS | Mod: AE

## 2022-08-05 NOTE — LETTER
"    8/5/2022         RE: Marley Weldon  [address]  [facility] [address]        Dear Colleague,    Thank you for referring your patient, Marley Weldon, to the [facility]. Please see a copy of my visit note below.    Diabetes Self-Management Education & Support    SUBJECTIVE/OBJECTIVE:  Presents for education related to gestational diabetes.    Accompanied by: Daughter  Previously had Gestational Diabetes: No    Cultural Influences/Ethnic Background:  Not  or       LMP 01/04/2022 (Exact Date)         Estimated Date of Delivery: Oct 11, 2022 - c/s 10/5/22  Preg #3, 1 misc, 4 Y/o daughter       Blood Glucose/Ketone Log:   Date Ketones Fasting Post Breakfast Post Lunch Post Supper   8/5 neg 91 125     8/4 trace 91 108 131 115   8/3 trace 97 113 11 84   8/2 neg 90 99 96 105   8/1 neg 99 101 133 123   7/31  100 105 170 132   7/30  95 111 102 149         7/29         122         133         103         Healthy Coping:  Emotional response to diabetes: Ready to learn  Stage of change: ACTION (Actively working towards change)    Current Management:  Taking medications for gestational diabetes?: No    ASSESSMENT:  Pt seen today for f/u. She brings in detailed food and BG log.   All elevations seem to make sense. FBG elevations with \"a lot of ice cream\" at HS. Discussed watching the portion and trying to add protein at HS.   Pp elevations were both with Chinese buffet. Pt notes she is really trying to be mindful of healthy eating.   Pt is going out of town to a cabin in 2 weeks, so she will f/u next week again.     INTERVENTION:  Educational topics covered today:  What to expect after delivery, Future testing for Type 2 diabetes (2 hour OGTT at 6 week post-partum check-up and annual fasting blood glucose level), Risk of GDM and planning ahead for future pregnancies, Recommended lifestyle interventions for reducing the risk of Type 2 Diabetes, When to Call a Diabetes Educator " or OB Provider    Educational Materials provided today:  Maki Preventing Diabetes    PLAN:    Check glucose 4 times daily.  Check ketones once a week when readings are consistently negative.  Continue with recommended physical activity.  Continue to follow recommended meal plan: 30 carbs at breakfast, 60 carbs at lunch, 60 carbs at supper, 30 carbs at snacks.  Follow consistent CHO meal plan, eat CHO and protein/fat at all meals/snacks.    Call/e-mail/MyChart message diabetes educator if 3 or more blood sugars are above the goal in 1 week or if ketones are positive.      Time Spent: 30 minutes  Encounter Type: Individual    Any diabetes medication dose changes were made via the CDE Protocol and Collaborative Practice Agreement with the patient's referring provider. A copy of this encounter was shared with the provider.

## 2022-08-05 NOTE — PROGRESS NOTES
"Diabetes Self-Management Education & Support    SUBJECTIVE/OBJECTIVE:  Presents for education related to gestational diabetes.    Accompanied by: Daughter  Previously had Gestational Diabetes: No    Cultural Influences/Ethnic Background:  Not  or       LMP 01/04/2022 (Exact Date)         Estimated Date of Delivery: Oct 11, 2022 - c/s 10/5/22  Preg #3, 1 misc, 6 Y/o daughter       Blood Glucose/Ketone Log:   Date Ketones Fasting Post Breakfast Post Lunch Post Supper   8/5 neg 91 125     8/4 trace 91 108 131 115   8/3 trace 97 113 11 84   8/2 neg 90 99 96 105   8/1 neg 99 101 133 123   7/31  100 105 170 132   7/30  95 111 102 149         7/29         122         133         103         Healthy Coping:  Emotional response to diabetes: Ready to learn  Stage of change: ACTION (Actively working towards change)    Current Management:  Taking medications for gestational diabetes?: No    ASSESSMENT:  Pt seen today for f/u. She brings in detailed food and BG log.   All elevations seem to make sense. FBG elevations with \"a lot of ice cream\" at HS. Discussed watching the portion and trying to add protein at HS.   Pp elevations were both with Chinese buffet. Pt notes she is really trying to be mindful of healthy eating.   Pt is going out of town to a cabin in 2 weeks, so she will f/u next week again.     INTERVENTION:  Educational topics covered today:  What to expect after delivery, Future testing for Type 2 diabetes (2 hour OGTT at 6 week post-partum check-up and annual fasting blood glucose level), Risk of GDM and planning ahead for future pregnancies, Recommended lifestyle interventions for reducing the risk of Type 2 Diabetes, When to Call a Diabetes Educator or OB Provider    Educational Materials provided today:  Maki Preventing Diabetes    PLAN:    Check glucose 4 times daily.  Check ketones once a week when readings are consistently negative.  Continue with recommended physical activity.  Continue to " follow recommended meal plan: 30 carbs at breakfast, 60 carbs at lunch, 60 carbs at supper, 30 carbs at snacks.  Follow consistent CHO meal plan, eat CHO and protein/fat at all meals/snacks.    Call/e-mail/MyChart message diabetes educator if 3 or more blood sugars are above the goal in 1 week or if ketones are positive.      Time Spent: 30 minutes  Encounter Type: Individual    Any diabetes medication dose changes were made via the CDE Protocol and Collaborative Practice Agreement with the patient's referring provider. A copy of this encounter was shared with the provider.

## 2022-08-11 ENCOUNTER — MYC MEDICAL ADVICE (OUTPATIENT)
Dept: MIDWIFE SERVICES | Facility: CLINIC | Age: 47
End: 2022-08-11

## 2022-08-11 DIAGNOSIS — E55.9 VITAMIN D DEFICIENCY: Primary | ICD-10-CM

## 2022-08-12 ENCOUNTER — ALLIED HEALTH/NURSE VISIT (OUTPATIENT)
Dept: EDUCATION SERVICES | Facility: CLINIC | Age: 47
End: 2022-08-12
Payer: COMMERCIAL

## 2022-08-12 DIAGNOSIS — O24.410 DIET CONTROLLED GESTATIONAL DIABETES MELLITUS (GDM) IN THIRD TRIMESTER: Primary | ICD-10-CM

## 2022-08-12 PROCEDURE — G0108 DIAB MANAGE TRN  PER INDIV: HCPCS | Mod: AE

## 2022-08-12 NOTE — LETTER
8/12/2022         RE: Marley Weldon  [address]  [facility] [address]        Dear Colleague,    Thank you for referring your patient, Marley Weldon, to the [facility]. Please see a copy of my visit note below.    Diabetes Self-Management Education & Support    SUBJECTIVE/OBJECTIVE:  Presents for education related to gestational diabetes.    Accompanied by: Self  Previously had Gestational Diabetes: No    Cultural Influences/Ethnic Background:  Not  or       Estimated Date of Delivery: Oct 11, 2022 c/s: planned for 10/5/22  Preg #3: 1 misc     Blood Glucose/Ketone Log:   Date Ketones Fasting Post Breakfast Post Lunch Post Supper   8/12 neg 102 111 118    8/11 small 101 113 119 141   8/10 neg 106 100 112 115   8/9 trace 90 97 93 --   8/8 neg 89 103 105 116   8/7 mod 88 114 108 78   8/6 Neg 94 142 101 111          Healthy Coping:  Emotional response to diabetes: Ready to learn  Stage of change: ACTION (Actively working towards change)    Current Management:  Taking medications for gestational diabetes?: No    ASSESSMENT:  Pt seen today for f/u. She brings in detailed food and BG log. Pp elevations are explainable.   Pt notes she had a lot of watermelon at HS on 8/9 and 8/10. She had no HS snack last night. Also notes some stress that started on 8/10.   Will continue to monitor. Pt will call on Monday to report how BG were over the weekend. Discussed if FBG remaining >95 will look at starting insulin. Pt is ok with this plan. Demonstrated use of pen. Pt s/o is T1 diabetic so she is familiar w/ insulin.       INTERVENTION:  Educational topics covered today:  What to expect after delivery, Future testing for Type 2 diabetes (2 hour OGTT at 6 week post-partum check-up and annual fasting blood glucose level), Risk of GDM and planning ahead for future pregnancies, Recommended lifestyle interventions for reducing the risk of Type 2 Diabetes, When to Call a Diabetes  Educator or OB Provider    Educational Materials provided today:  Maki Preventing Diabetes    PLAN:  Call Monday to report BG. Will f/u 8/22 virtually w/ Julia.   Check glucose 4 times daily.  Check ketones once a week when readings are consistently negative.  Continue with recommended physical activity.  Continue to follow recommended meal plan: 30 carbs at breakfast, 60 carbs at lunch, 60 carbs at supper, 30 carbs at snacks.  Follow consistent CHO meal plan, eat CHO and protein/fat at all meals/snacks.    Call/e-mail/Free Flow Powerhart message diabetes educator if 3 or more blood sugars are above the goal in 1 week or if ketones are positive.      Time Spent: 60 minutes  Encounter Type: Individual    Any diabetes medication dose changes were made via the CDE Protocol and Collaborative Practice Agreement with the patient's referring provider. A copy of this encounter was shared with the provider.

## 2022-08-12 NOTE — PROGRESS NOTES
Diabetes Self-Management Education & Support    SUBJECTIVE/OBJECTIVE:  Presents for education related to gestational diabetes.    Accompanied by: Self  Previously had Gestational Diabetes: No    Cultural Influences/Ethnic Background:  Not  or       Estimated Date of Delivery: Oct 11, 2022 c/s: planned for 10/5/22  Preg #3: 1 misc     Blood Glucose/Ketone Log:   Date Ketones Fasting Post Breakfast Post Lunch Post Supper   8/12 neg 102 111 118    8/11 small 101 113 119 141   8/10 neg 106 100 112 115   8/9 trace 90 97 93 --   8/8 neg 89 103 105 116   8/7 mod 88 114 108 78   8/6 Neg 94 142 101 111          Healthy Coping:  Emotional response to diabetes: Ready to learn  Stage of change: ACTION (Actively working towards change)    Current Management:  Taking medications for gestational diabetes?: No    ASSESSMENT:  Pt seen today for f/u. She brings in detailed food and BG log. Pp elevations are explainable.   Pt notes she had a lot of watermelon at HS on 8/9 and 8/10. She had no HS snack last night. Also notes some stress that started on 8/10.   Will continue to monitor. Pt will call on Monday to report how BG were over the weekend. Discussed if FBG remaining >95 will look at starting insulin. Pt is ok with this plan. Demonstrated use of pen. Pt s/o is T1 diabetic so she is familiar w/ insulin.       INTERVENTION:  Educational topics covered today:  What to expect after delivery, Future testing for Type 2 diabetes (2 hour OGTT at 6 week post-partum check-up and annual fasting blood glucose level), Risk of GDM and planning ahead for future pregnancies, Recommended lifestyle interventions for reducing the risk of Type 2 Diabetes, When to Call a Diabetes Educator or OB Provider    Educational Materials provided today:  Maki Preventing Diabetes    PLAN:  Call Monday to report BG. Will f/u 8/22 virtually w/ Julia.   Check glucose 4 times daily.  Check ketones once a week when readings are consistently  negative.  Continue with recommended physical activity.  Continue to follow recommended meal plan: 30 carbs at breakfast, 60 carbs at lunch, 60 carbs at supper, 30 carbs at snacks.  Follow consistent CHO meal plan, eat CHO and protein/fat at all meals/snacks.    Call/e-mail/MyChart message diabetes educator if 3 or more blood sugars are above the goal in 1 week or if ketones are positive.      Time Spent: 60 minutes  Encounter Type: Individual    Any diabetes medication dose changes were made via the CDE Protocol and Collaborative Practice Agreement with the patient's referring provider. A copy of this encounter was shared with the provider.

## 2022-08-15 ENCOUNTER — MYC MEDICAL ADVICE (OUTPATIENT)
Dept: MIDWIFE SERVICES | Facility: CLINIC | Age: 47
End: 2022-08-15

## 2022-08-16 NOTE — PROGRESS NOTES
"Marley presents to [facility] clinic for a routine prenatal visit at 32w1d. Feeling good.  GDM: a 4 slightly elevated fasting levels () and only one level above 140 (141 after Fabiola chin's)  Working really hard to eat exactly as told by the diabetic ed people. Teary. \"it controls my whole day\"  For next 2 wks will try no carb snack at night only meat, nut, egg, cheese.  Doing well with wt gain TWG 10#  Reports normal fetal movements. Discussed [facility]. Denies regular painful contractions, bleeding, leaking, changes in fetal movement.   Will check into working less--, shorter shifts. No lifting heavy objects, learning to ask for help.    Having issues with Vit D pre-authorization--request was sent to [employer]. Dept. CNM Will call pharmacy today.  Today hemoglobin and iron studies sent    PP supports--family.    Reviewed our recommendation that she take an iron supplement daily to boost her iron stores prior to birth   Pt  is currently supplementing daily; discussed strategies to manage constipation.     Reviewed  labor precautions, warning signs and when/how to call the on-call CNM.     Addendum: this CNM called pharmacy and the pharmacist said that a lot of the state plans just stopped covering over the counter supplements. Looking for a PNV that is covered. Switched from chewable to regular and see that it is covered.   Vit D isn't covered (but there is a chance the prior auth will help).  Called pt to let her know that the PNV will be ready for .  "

## 2022-08-17 ENCOUNTER — PRENATAL OFFICE VISIT (OUTPATIENT)
Dept: MIDWIFE SERVICES | Facility: CLINIC | Age: 47
End: 2022-08-17
Payer: COMMERCIAL

## 2022-08-17 ENCOUNTER — TELEPHONE (OUTPATIENT)
Dept: MIDWIFE SERVICES | Facility: CLINIC | Age: 47
End: 2022-08-17

## 2022-08-17 VITALS
SYSTOLIC BLOOD PRESSURE: 108 MMHG | BODY MASS INDEX: 29.19 KG/M2 | HEART RATE: 101 BPM | DIASTOLIC BLOOD PRESSURE: 54 MMHG | WEIGHT: 171 LBS | OXYGEN SATURATION: 97 % | HEIGHT: 64 IN

## 2022-08-17 DIAGNOSIS — O09.529 SUPERVISION OF HIGH-RISK PREGNANCY OF ELDERLY MULTIGRAVIDA: ICD-10-CM

## 2022-08-17 DIAGNOSIS — D50.9 IRON DEFICIENCY ANEMIA, UNSPECIFIED IRON DEFICIENCY ANEMIA TYPE: Primary | ICD-10-CM

## 2022-08-17 DIAGNOSIS — O09.529 HIGH-RISK PREGNANCY, ELDERLY MULTIGRAVIDA, UNSPECIFIED TRIMESTER: ICD-10-CM

## 2022-08-17 LAB
FERRITIN SERPL-MCNC: 28 NG/ML (ref 6–175)
HGB BLD-MCNC: 11.2 G/DL (ref 11.7–15.7)
IRON BINDING CAPACITY (ROCHE): 495 UG/DL (ref 240–430)
IRON SATN MFR SERPL: 12 % (ref 15–46)
IRON SERPL-MCNC: 58 UG/DL (ref 37–145)

## 2022-08-17 PROCEDURE — 99207 PR PRENATAL VISIT: CPT | Performed by: MIDWIFE

## 2022-08-17 PROCEDURE — 83540 ASSAY OF IRON: CPT | Performed by: MIDWIFE

## 2022-08-17 PROCEDURE — 82728 ASSAY OF FERRITIN: CPT | Performed by: MIDWIFE

## 2022-08-17 PROCEDURE — 36415 COLL VENOUS BLD VENIPUNCTURE: CPT | Performed by: MIDWIFE

## 2022-08-17 PROCEDURE — 85018 HEMOGLOBIN: CPT | Performed by: MIDWIFE

## 2022-08-17 PROCEDURE — 83550 IRON BINDING TEST: CPT | Performed by: MIDWIFE

## 2022-08-17 RX ORDER — FOLIC ACID, .BETA.-CAROTENE, ASCORBIC ACID, CHOLECALCIFEROL, .ALPHA.-TOCOPHEROL ACETATE, DL-, THIAMINE MONONITRATE, RIBOFLAVIN, NIACINAMIDE, PYRIDOXINE HYDROCHLORIDE, CYANOCOBALAMIN, CALCIUM PANTOTHENATE, CALCIUM CARBONATE, FERROUS FUMARATE, AND ZINC OXIDE 1; 1000; 100; 400; 30; 3; 3; 15; 20; 12; 7; 200; 29; 20 MG/1; [IU]/1; MG/1; [IU]/1; [IU]/1; MG/1; MG/1; MG/1; MG/1; UG/1; MG/1; MG/1; MG/1; MG/1
1 TABLET, CHEWABLE ORAL DAILY
Qty: 60 TABLET | Refills: 3 | Status: CANCELLED | OUTPATIENT
Start: 2022-08-17

## 2022-08-17 RX ORDER — URINE ACETONE TEST STRIPS
STRIP MISCELLANEOUS
COMMUNITY
Start: 2022-07-29 | End: 2022-10-11

## 2022-08-17 RX ORDER — LANCETS
EACH MISCELLANEOUS
COMMUNITY
Start: 2022-07-28 | End: 2022-10-11

## 2022-08-17 RX ORDER — CHOLECALCIFEROL (VITAMIN D3) 50 MCG
2 TABLET ORAL DAILY
Qty: 60 TABLET | Refills: 1 | Status: CANCELLED | OUTPATIENT
Start: 2022-08-17

## 2022-08-17 NOTE — TELEPHONE ENCOUNTER
Who is requesting: Patient     Pharmacy Name and Location: [facility] #[id number] [place] [address] AT [facility] [address]  [phone]    Medication Name: sertraline (ZOLOFT) 50 MG tablet    Insurance Plan: [employer]    Insurance Member ID Number: [id number]    Informed patient that prior authorizations can take up to 10 business days for response: Yes     Okay to leave a detailed message: Yes    Route to [facility] MED [place].

## 2022-08-17 NOTE — TELEPHONE ENCOUNTER
PA Initiation    Medication: vitamin D3 (CHOLECALCIFEROL) 100 mcg (4000 units) tablet PA INITIATED 08/17/2022  Insurance Company: [employer] ([facility]) - Phone [phone] Fax [phone]  Pharmacy Filling the Rx: [facility] #[id number] [place], [place] - [facility] AT [facility] [address]  Filling Pharmacy Phone: [phone]  Filling Pharmacy Fax:    Start Date: 8/17/2022    Central Prior Authorization Team   Phone: [phone]

## 2022-08-17 NOTE — TELEPHONE ENCOUNTER
Who is requesting: Patient      Pharmacy Name and Location: [facility] DR[facility] STORE #[id number] [place] [id number] AT [facility] [address]  [phone]     Medication Name:   Prenatal Vit-Fe Fumarate-FA (PRENATAL 19) 29-1 MG CHEW  1 tablet, DAILY       Insurance Plan: United Healthcare     Insurance Member ID Number: [id number]     Informed patient that prior authorizations can take up to 10 business days for response: Yes      Okay to leave a detailed message: Yes     Route to [facility] MED pool.

## 2022-08-17 NOTE — TELEPHONE ENCOUNTER
PA Initiation    Medication: Prenatal Vit-Fe Fumarate-FA (PRENATAL 19) 29-1 MG CHEW  Insurance Company: [employer] ([facility]) - Phone [phone] Fax [phone]  Pharmacy Filling the Rx: [facility] #[id number] [place], [place] - [facility] AT [facility] [address]  Filling Pharmacy Phone: [phone]  Filling Pharmacy Fax: [phone]  Start Date: 8/17/2022

## 2022-08-17 NOTE — TELEPHONE ENCOUNTER
Prior Authorization Not Needed per Insurance    Medication: vitamin D3 (CHOLECALCIFEROL) 100mcg (4000 units) tablet PA NOT NEEDED  Insurance Company: [employer] ([facility]) - Phone [phone] Fax [phone]  Expected CoPay:      Pharmacy Filling the Rx: [facility] #[id number] - [place], [place] - [facility] AT [facility] [address]  Pharmacy Notified: Yes  Patient Notified: Comment:  Pharmacy will notify patient.

## 2022-08-17 NOTE — TELEPHONE ENCOUNTER
Who is requesting: Patient     Pharmacy Name and Location: [facility] DR[facility] STORE #[id number] [place] [address] AT [facility] [address]  [phone]    Medication Name: vitamin D3 (CHOLECALCIFEROL) 50 mcg (2000 units) tablet    Insurance Plan: United Healthcare    Insurance Member ID Number: [id number]    Informed patient that prior authorizations can take up to 10 business days for response: Yes     Okay to leave a detailed message: Yes    Route to [facility] MED [place].

## 2022-08-18 ENCOUNTER — TELEPHONE (OUTPATIENT)
Dept: MIDWIFE SERVICES | Facility: CLINIC | Age: 47
End: 2022-08-18

## 2022-08-18 DIAGNOSIS — L29.9 PRURITUS OF PREGNANCY IN THIRD TRIMESTER: Primary | ICD-10-CM

## 2022-08-18 DIAGNOSIS — O24.410 DIET CONTROLLED GESTATIONAL DIABETES MELLITUS (GDM) IN THIRD TRIMESTER: Primary | ICD-10-CM

## 2022-08-18 DIAGNOSIS — G56.00 CARPAL TUNNEL SYNDROME DURING PREGNANCY: ICD-10-CM

## 2022-08-18 DIAGNOSIS — O99.713 PRURITUS OF PREGNANCY IN THIRD TRIMESTER: Primary | ICD-10-CM

## 2022-08-18 DIAGNOSIS — L29.9 PRURITUS OF PREGNANCY IN THIRD TRIMESTER: ICD-10-CM

## 2022-08-18 DIAGNOSIS — O26.899 CARPAL TUNNEL SYNDROME DURING PREGNANCY: ICD-10-CM

## 2022-08-18 DIAGNOSIS — O09.523 MULTIGRAVIDA OF ADVANCED MATERNAL AGE IN THIRD TRIMESTER: ICD-10-CM

## 2022-08-18 DIAGNOSIS — O99.713 PRURITUS OF PREGNANCY IN THIRD TRIMESTER: ICD-10-CM

## 2022-08-18 NOTE — TELEPHONE ENCOUNTER
Prior Authorization Not Needed per Insurance    Medication: Prenatal Vit-Fe Fumarate-FA (PRENATAL 19) 29-1 MG CHEW  Insurance Company: [employer] ([facility]) - Phone [phone] Fax [phone]  Expected CoPay:      Pharmacy Filling the Rx: [facility] #[id number] - [place], [place] - [address] AT [facility] OF [address]  Pharmacy Notified: Yes  Patient Notified: Yes  Pharmacy received paid claim.

## 2022-08-18 NOTE — TELEPHONE ENCOUNTER
Pt called CNM on call reporting itching on hands bilaterally.  Describes itching as mostly on the webbing between her thumb and pointer finger. She has been having ongoing issues with carpal tunnel during her pregnancy and notes that itching is worse when her carpal tunnel symptoms are aggravated and seem to improve when carpal tunnel symptoms are less.  Itching worst first thing in AM.  Denies itching on her feet or the rest of her body.  Normal fetal movement.  Denies VB/LOF.  Denies regular contractions.  Discussed itching is likely r/t carpal tunnel but recommendation for visit this week to evaluate and rule out cholestasis, patient is out of town until Friday evening and prefers to come in next week.  Reviewed warning signs, encouraged BID kick counts until appt.  Call for any questions/concerns or prn.  Pt scheduled for visit/NST Monday at 1200.  Has growth US/BPP already scheduled at 11 on Monday.

## 2022-08-22 ENCOUNTER — ANCILLARY PROCEDURE (OUTPATIENT)
Dept: ULTRASOUND IMAGING | Facility: HOSPITAL | Age: 47
End: 2022-08-22
Attending: OBSTETRICS & GYNECOLOGY
Payer: COMMERCIAL

## 2022-08-22 ENCOUNTER — PRENATAL OFFICE VISIT (OUTPATIENT)
Dept: MIDWIFE SERVICES | Facility: CLINIC | Age: 47
End: 2022-08-22

## 2022-08-22 ENCOUNTER — OFFICE VISIT (OUTPATIENT)
Dept: MATERNAL FETAL MEDICINE | Facility: HOSPITAL | Age: 47
End: 2022-08-22
Attending: OBSTETRICS & GYNECOLOGY
Payer: COMMERCIAL

## 2022-08-22 VITALS
WEIGHT: 170 LBS | HEART RATE: 88 BPM | BODY MASS INDEX: 29.18 KG/M2 | SYSTOLIC BLOOD PRESSURE: 108 MMHG | DIASTOLIC BLOOD PRESSURE: 56 MMHG

## 2022-08-22 DIAGNOSIS — O26.642 CHOLESTASIS DURING PREGNANCY IN SECOND TRIMESTER: Primary | ICD-10-CM

## 2022-08-22 DIAGNOSIS — O09.522 AMA (ADVANCED MATERNAL AGE) MULTIGRAVIDA 35+, SECOND TRIMESTER: ICD-10-CM

## 2022-08-22 DIAGNOSIS — O09.523 AMA (ADVANCED MATERNAL AGE) MULTIGRAVIDA 35+, THIRD TRIMESTER: Primary | ICD-10-CM

## 2022-08-22 DIAGNOSIS — L29.89 PRURITUS OF PALM: ICD-10-CM

## 2022-08-22 LAB
ALBUMIN SERPL BCG-MCNC: 3.4 G/DL (ref 3.5–5.2)
ALP SERPL-CCNC: 136 U/L (ref 35–104)
ALT SERPL W P-5'-P-CCNC: 10 U/L (ref 10–35)
ANION GAP SERPL CALCULATED.3IONS-SCNC: 10 MMOL/L (ref 7–15)
AST SERPL W P-5'-P-CCNC: 20 U/L (ref 10–35)
BASOPHILS # BLD AUTO: 0 10E3/UL (ref 0–0.2)
BASOPHILS NFR BLD AUTO: 0 %
BILIRUB SERPL-MCNC: 0.3 MG/DL
BUN SERPL-MCNC: 6.6 MG/DL (ref 6–20)
CALCIUM SERPL-MCNC: 8.8 MG/DL (ref 8.6–10)
CHLORIDE SERPL-SCNC: 107 MMOL/L (ref 98–107)
CREAT SERPL-MCNC: 0.47 MG/DL (ref 0.51–0.95)
DEPRECATED HCO3 PLAS-SCNC: 19 MMOL/L (ref 22–29)
EOSINOPHIL # BLD AUTO: 0.1 10E3/UL (ref 0–0.7)
EOSINOPHIL NFR BLD AUTO: 1 %
ERYTHROCYTE [DISTWIDTH] IN BLOOD BY AUTOMATED COUNT: 13.9 % (ref 10–15)
GFR SERPL CREATININE-BSD FRML MDRD: >90 ML/MIN/1.73M2
GLUCOSE SERPL-MCNC: 67 MG/DL (ref 70–99)
HCT VFR BLD AUTO: 33.3 % (ref 35–47)
HGB BLD-MCNC: 11.4 G/DL (ref 11.7–15.7)
IMM GRANULOCYTES # BLD: 0.1 10E3/UL
IMM GRANULOCYTES NFR BLD: 1 %
LYMPHOCYTES # BLD AUTO: 1.6 10E3/UL (ref 0.8–5.3)
LYMPHOCYTES NFR BLD AUTO: 19 %
MCH RBC QN AUTO: 30.1 PG (ref 26.5–33)
MCHC RBC AUTO-ENTMCNC: 34.2 G/DL (ref 31.5–36.5)
MCV RBC AUTO: 88 FL (ref 78–100)
MONOCYTES # BLD AUTO: 0.6 10E3/UL (ref 0–1.3)
MONOCYTES NFR BLD AUTO: 8 %
NEUTROPHILS # BLD AUTO: 6 10E3/UL (ref 1.6–8.3)
NEUTROPHILS NFR BLD AUTO: 71 %
PLATELET # BLD AUTO: 321 10E3/UL (ref 150–450)
POTASSIUM SERPL-SCNC: 4 MMOL/L (ref 3.4–5.3)
PROT SERPL-MCNC: 6.2 G/DL (ref 6.4–8.3)
RBC # BLD AUTO: 3.79 10E6/UL (ref 3.8–5.2)
SODIUM SERPL-SCNC: 136 MMOL/L (ref 136–145)
WBC # BLD AUTO: 8.5 10E3/UL (ref 4–11)

## 2022-08-22 PROCEDURE — 80053 COMPREHEN METABOLIC PANEL: CPT | Performed by: ADVANCED PRACTICE MIDWIFE

## 2022-08-22 PROCEDURE — 99000 SPECIMEN HANDLING OFFICE-LAB: CPT | Performed by: ADVANCED PRACTICE MIDWIFE

## 2022-08-22 PROCEDURE — 82239 BILE ACIDS TOTAL: CPT | Mod: 90 | Performed by: ADVANCED PRACTICE MIDWIFE

## 2022-08-22 PROCEDURE — 36415 COLL VENOUS BLD VENIPUNCTURE: CPT | Performed by: MIDWIFE

## 2022-08-22 PROCEDURE — 76819 FETAL BIOPHYS PROFIL W/O NST: CPT | Mod: 26 | Performed by: OBSTETRICS & GYNECOLOGY

## 2022-08-22 PROCEDURE — 99207 PR NO CHARGE LOS: CPT | Performed by: OBSTETRICS & GYNECOLOGY

## 2022-08-22 PROCEDURE — 99213 OFFICE O/P EST LOW 20 MIN: CPT | Mod: 25 | Performed by: MIDWIFE

## 2022-08-22 PROCEDURE — 59025 FETAL NON-STRESS TEST: CPT | Performed by: MIDWIFE

## 2022-08-22 PROCEDURE — 76816 OB US FOLLOW-UP PER FETUS: CPT

## 2022-08-22 PROCEDURE — 76816 OB US FOLLOW-UP PER FETUS: CPT | Mod: 26 | Performed by: OBSTETRICS & GYNECOLOGY

## 2022-08-22 PROCEDURE — 85025 COMPLETE CBC W/AUTO DIFF WBC: CPT | Performed by: MIDWIFE

## 2022-08-22 NOTE — PROGRESS NOTES
"Problem visit today: Pruritus of the palms.  Patient called because she was \"doing it\" and worried that she might have cholestasis.  Patient presents with her partner Everardo today as well.  Patient states that she has severe carpal tunnel and will  braces today.  When her hand goes numb since sometimes when it starts to come back into feeling she gets itchy right where the carpal tunnel is.  She does not think that her feet are itchy but sometimes she has bug bites when she has been at the cabin.  But not on the soles of her feet.  Baby has been very active.  Suspect no cholestasis but just to be sure we will do NST today, and lab work.  Patient is a gestational diabetic and her sugars have been very good lately she states.  \"I am finally figuring this out\".  Recommended twice daily fetal movement counting as directed.  Lab test today include CMP, CBC, bile acid total.    Patient states that she also saw MFM today and that the baby looks great.  She had her growth ultrasound today at 32 weeks and she will start weekly visits with [facility] at 36 weeks.  No other complaints.    NST reactive.  Baseline is 140, moderate variability present no significant decelerations noted.  Baby is very active!  While patient pushed the movement button frequently, this CNM could hear baby moving and would readjust the toco and  baby's heartbeat at the normal baseline.  During this time there was some noncontiguous monitor strip for baseline.  Patient also had just come from [facility] where she had a very reassuring biophysical profile 8 out of 8 with good amniotic fluid.  "

## 2022-08-22 NOTE — PROGRESS NOTES
Marley presents to [facility] clinic for a routine prenatal visit at 34w1d. Feeling great. Itching improved on hands. Had itching over body too (but only over areas where sun touches)--thinks it was too much iron causing her itching. Now improved today. Took benedryl and it helped.  Reports normal fetal movements. Discussed [facility] [place]. Denies regular painful contractions, bleeding, leaking, changes in fetal movement.   GDM 5 elevated out of 56 values (with increase in carbs). Only 2 elevated fasting levels  Checked wt gain and just under normal.  Taking magnesium for restless legs--magnesium.    Questions about itching.  Only working for 2 more weeks.    EPDS today: , never to #10.  Handouts given regarding GBS, breast-feeding and postpartum depression/anxiety and wellbeing.      Reviewed our recommendation that she take an iron supplement daily to boost her iron stores prior to birth   Will stop he supplemental iron.      labor precautions and danger signs and symptoms reviewed.  All questions answered.  Encouraged daily fetal movement counting and to call or return to clinic with any questions, concerns, or as needed.    Plan GBS and hgb next visit. Also discussed cervical exam or bedside limited ultrasound to confirm fetal presentation.

## 2022-08-23 LAB — BILE AC SERPL-SCNC: 1 UMOL/L

## 2022-08-25 ENCOUNTER — MYC MEDICAL ADVICE (OUTPATIENT)
Dept: MIDWIFE SERVICES | Facility: CLINIC | Age: 47
End: 2022-08-25

## 2022-08-30 ENCOUNTER — TELEPHONE (OUTPATIENT)
Dept: MIDWIFE SERVICES | Facility: CLINIC | Age: 47
End: 2022-08-30

## 2022-08-30 DIAGNOSIS — L29.89 PRURITUS OF PALM: ICD-10-CM

## 2022-08-30 DIAGNOSIS — O99.019 ANEMIA DURING PREGNANCY: ICD-10-CM

## 2022-08-30 DIAGNOSIS — O99.810 ABNORMAL GLUCOSE AFFECTING PREGNANCY: ICD-10-CM

## 2022-08-30 DIAGNOSIS — O24.410 DIET CONTROLLED GESTATIONAL DIABETES MELLITUS (GDM), ANTEPARTUM: Primary | ICD-10-CM

## 2022-08-30 DIAGNOSIS — O09.529 SUPERVISION OF HIGH-RISK PREGNANCY OF ELDERLY MULTIGRAVIDA: ICD-10-CM

## 2022-08-30 NOTE — TELEPHONE ENCOUNTER
Telephone call:   Marley is 34w0d, calling to report continued itching, worse at night. Especially pronounced on shoulders, back and arms. Trying oatmeal baths, lotion and benadryl at night with little relief. Wonders if she has developed cholestasis. Had normal labs last Monday. Also wonders if the increased oral iron she is taking is causing some symptoms. Will try to decrease oral iron intake to see if this helps. This writer is unfamiliar with  a connection between iron supplements and itching, but certainly worth a try to decrease.     She has a CNM visit scheduled for tomorrow. Encouraged to request repeat cholestasis labs (bile acids & hepatic panel).  Continue comfort measures. All questions answered, agrees with plan.     TAY Paul, CNM, IBCLC  [facility]  Midwifery

## 2022-08-31 ENCOUNTER — PRENATAL OFFICE VISIT (OUTPATIENT)
Dept: MIDWIFE SERVICES | Facility: CLINIC | Age: 47
End: 2022-08-31
Payer: COMMERCIAL

## 2022-08-31 VITALS
WEIGHT: 172 LBS | DIASTOLIC BLOOD PRESSURE: 62 MMHG | SYSTOLIC BLOOD PRESSURE: 110 MMHG | OXYGEN SATURATION: 97 % | HEART RATE: 95 BPM | BODY MASS INDEX: 29.52 KG/M2

## 2022-08-31 DIAGNOSIS — O09.529 SUPERVISION OF HIGH-RISK PREGNANCY OF ELDERLY MULTIGRAVIDA: Primary | ICD-10-CM

## 2022-08-31 PROCEDURE — 99207 PR PRENATAL VISIT: CPT | Performed by: MIDWIFE

## 2022-08-31 RX ORDER — ACETAMINOPHEN 160 MG
TABLET,DISINTEGRATING ORAL
COMMUNITY
Start: 2022-08-17 | End: 2022-08-31

## 2022-08-31 ASSESSMENT — EDINBURGH POSTNATAL DEPRESSION SCALE (EPDS)
THE THOUGHT OF HARMING MYSELF HAS OCCURRED TO ME: NEVER
I HAVE FELT SAD OR MISERABLE: NO, NOT AT ALL
I HAVE BEEN SO UNHAPPY THAT I HAVE BEEN CRYING: NO, NEVER
I HAVE LOOKED FORWARD WITH ENJOYMENT TO THINGS: AS MUCH AS I EVER DID
I HAVE BEEN SO UNHAPPY THAT I HAVE HAD DIFFICULTY SLEEPING: NOT VERY OFTEN
I HAVE FELT SCARED OR PANICKY FOR NO GOOD REASON: NO, NOT MUCH
TOTAL SCORE: 7
I HAVE BEEN ABLE TO LAUGH AND SEE THE FUNNY SIDE OF THINGS: AS MUCH AS I ALWAYS COULD
I HAVE BEEN ANXIOUS OR WORRIED FOR NO GOOD REASON: YES, SOMETIMES
THINGS HAVE BEEN GETTING ON TOP OF ME: NO, MOST OF THE TIME I HAVE COPED QUITE WELL
I HAVE BLAMED MYSELF UNNECESSARILY WHEN THINGS WENT WRONG: YES, SOME OF THE TIME

## 2022-09-14 ENCOUNTER — ANCILLARY PROCEDURE (OUTPATIENT)
Dept: ULTRASOUND IMAGING | Facility: HOSPITAL | Age: 47
End: 2022-09-14
Attending: OBSTETRICS & GYNECOLOGY
Payer: COMMERCIAL

## 2022-09-14 ENCOUNTER — OFFICE VISIT (OUTPATIENT)
Dept: MATERNAL FETAL MEDICINE | Facility: HOSPITAL | Age: 47
End: 2022-09-14
Attending: OBSTETRICS & GYNECOLOGY
Payer: COMMERCIAL

## 2022-09-14 ENCOUNTER — PRENATAL OFFICE VISIT (OUTPATIENT)
Dept: MIDWIFE SERVICES | Facility: CLINIC | Age: 47
End: 2022-09-14

## 2022-09-14 VITALS
SYSTOLIC BLOOD PRESSURE: 108 MMHG | OXYGEN SATURATION: 98 % | WEIGHT: 177 LBS | HEART RATE: 97 BPM | BODY MASS INDEX: 30.38 KG/M2 | DIASTOLIC BLOOD PRESSURE: 62 MMHG

## 2022-09-14 DIAGNOSIS — O09.523 AMA (ADVANCED MATERNAL AGE) MULTIGRAVIDA 35+, THIRD TRIMESTER: ICD-10-CM

## 2022-09-14 DIAGNOSIS — O09.93 HIGH-RISK PREGNANCY IN THIRD TRIMESTER: Primary | ICD-10-CM

## 2022-09-14 DIAGNOSIS — O09.523 AMA (ADVANCED MATERNAL AGE) MULTIGRAVIDA 35+, THIRD TRIMESTER: Primary | ICD-10-CM

## 2022-09-14 LAB
HGB BLD-MCNC: 12.1 G/DL (ref 11.7–15.7)
HOLD SPECIMEN: NORMAL

## 2022-09-14 PROCEDURE — 36415 COLL VENOUS BLD VENIPUNCTURE: CPT | Performed by: MIDWIFE

## 2022-09-14 PROCEDURE — 99207 PR NO CHARGE LOS: CPT | Performed by: OBSTETRICS & GYNECOLOGY

## 2022-09-14 PROCEDURE — 76819 FETAL BIOPHYS PROFIL W/O NST: CPT | Mod: 26 | Performed by: OBSTETRICS & GYNECOLOGY

## 2022-09-14 PROCEDURE — 85018 HEMOGLOBIN: CPT | Performed by: MIDWIFE

## 2022-09-14 PROCEDURE — 87653 STREP B DNA AMP PROBE: CPT | Performed by: MIDWIFE

## 2022-09-14 PROCEDURE — 99207 PR PRENATAL VISIT: CPT | Performed by: MIDWIFE

## 2022-09-14 PROCEDURE — 76819 FETAL BIOPHYS PROFIL W/O NST: CPT

## 2022-09-14 RX ORDER — HYDROXYZINE PAMOATE 50 MG/1
50 CAPSULE ORAL
Qty: 10 CAPSULE | Refills: 0 | Status: ON HOLD | OUTPATIENT
Start: 2022-09-14 | End: 2022-10-08

## 2022-09-14 NOTE — PROGRESS NOTES
"Marley presents to [facility] clinic for a routine prenatal visit at 36w 1d.  Repeat  with Dr. Black scheduled 10/5/2022.    Feeling ok in general but not sleeping because of carpal tunnel pain. \"I'm averaging 3 hrs per night\". Rx vistaril.    Itching is better--top of shoulders and armpits.  GDM--levels have been good. 6 elevations (with reasons for slight elevations)--3 of them were fasting and 3 were 1 hr PP (pt agrees to no carbs for night snack--only beef jerky, cheese or nuts) 56 total levels.  Reports normal fetal movements. Discussed [facility]. Denies regular painful contractions, bleeding, leaking, changes in fetal movement.   Offers no questions or concerns.        Reviewed our recommendation that she take an iron supplement daily to boost her iron stores prior to birth   Pt is not currently supplementing daily; discussed strategies to manage constipation.   SVE: cx cl/50%/soft/mid/vtx -2 ballots    GBS and hgb today. Cervical exam confirms cephalic presentation.   Reviewed term labor precautions, warning signs and when/how to call the on-call CNM. Encouraged weekly visits until birth.   "

## 2022-09-15 ENCOUNTER — DOCUMENTATION ONLY (OUTPATIENT)
Dept: MIDWIFE SERVICES | Facility: CLINIC | Age: 47
End: 2022-09-15

## 2022-09-15 LAB — GP B STREP DNA SPEC QL NAA+PROBE: NEGATIVE

## 2022-09-15 NOTE — PROGRESS NOTES
"Marley presents to [facility] for a routine prenatal visit at 37w 1d. Feeling well.  GDM: only 2 PP numbers elevated, no abnormal fasting  Repeat  with Dr. Black scheduled 10/5/2022  Sleeping better, Vistaril not helping, but doing better.  Itching still in the evenings, restless legs--taking Magnesium.  Offers no questions or concerns. Discussed [facility]  Feeling anxious and excited about upcoming labor/birth. Pt is \"nesting.\"    Reviewed our recommendation that she take an iron supplement daily to boost her iron stores prior to birth   Pt is not currently supplementing daily; discussed strategies to manage constipation.     Reviewed term labor precautions, warning signs and when/how to call the on-call CNM. Encouraged weekly visits until birth.   "

## 2022-09-19 ENCOUNTER — OFFICE VISIT (OUTPATIENT)
Dept: MATERNAL FETAL MEDICINE | Facility: HOSPITAL | Age: 47
End: 2022-09-19
Attending: OBSTETRICS & GYNECOLOGY
Payer: COMMERCIAL

## 2022-09-19 ENCOUNTER — ANCILLARY PROCEDURE (OUTPATIENT)
Dept: ULTRASOUND IMAGING | Facility: HOSPITAL | Age: 47
End: 2022-09-19
Attending: OBSTETRICS & GYNECOLOGY
Payer: COMMERCIAL

## 2022-09-19 DIAGNOSIS — O09.523 AMA (ADVANCED MATERNAL AGE) MULTIGRAVIDA 35+, THIRD TRIMESTER: Primary | ICD-10-CM

## 2022-09-19 DIAGNOSIS — O09.523 AMA (ADVANCED MATERNAL AGE) MULTIGRAVIDA 35+, THIRD TRIMESTER: ICD-10-CM

## 2022-09-19 PROCEDURE — 99207 PR NO CHARGE LOS: CPT | Performed by: OBSTETRICS & GYNECOLOGY

## 2022-09-19 PROCEDURE — 76816 OB US FOLLOW-UP PER FETUS: CPT | Mod: 26 | Performed by: OBSTETRICS & GYNECOLOGY

## 2022-09-19 PROCEDURE — 76816 OB US FOLLOW-UP PER FETUS: CPT

## 2022-09-19 PROCEDURE — 76819 FETAL BIOPHYS PROFIL W/O NST: CPT

## 2022-09-19 PROCEDURE — 76819 FETAL BIOPHYS PROFIL W/O NST: CPT | Mod: 26 | Performed by: OBSTETRICS & GYNECOLOGY

## 2022-09-20 ENCOUNTER — DOCUMENTATION ONLY (OUTPATIENT)
Dept: MIDWIFE SERVICES | Facility: CLINIC | Age: 47
End: 2022-09-20

## 2022-09-21 ENCOUNTER — PRENATAL OFFICE VISIT (OUTPATIENT)
Dept: MIDWIFE SERVICES | Facility: CLINIC | Age: 47
End: 2022-09-21
Payer: COMMERCIAL

## 2022-09-21 VITALS
OXYGEN SATURATION: 98 % | HEIGHT: 64 IN | WEIGHT: 178 LBS | SYSTOLIC BLOOD PRESSURE: 112 MMHG | BODY MASS INDEX: 30.39 KG/M2 | DIASTOLIC BLOOD PRESSURE: 64 MMHG | HEART RATE: 102 BPM

## 2022-09-21 DIAGNOSIS — O09.529 SUPERVISION OF HIGH-RISK PREGNANCY OF ELDERLY MULTIGRAVIDA: ICD-10-CM

## 2022-09-21 PROCEDURE — 99207 PR PRENATAL VISIT: CPT | Performed by: MIDWIFE

## 2022-09-22 ENCOUNTER — MYC MEDICAL ADVICE (OUTPATIENT)
Dept: MIDWIFE SERVICES | Facility: CLINIC | Age: 47
End: 2022-09-22

## 2022-09-23 ENCOUNTER — MYC MEDICAL ADVICE (OUTPATIENT)
Dept: MIDWIFE SERVICES | Facility: CLINIC | Age: 47
End: 2022-09-23

## 2022-09-26 ENCOUNTER — DOCUMENTATION ONLY (OUTPATIENT)
Dept: MIDWIFE SERVICES | Facility: CLINIC | Age: 47
End: 2022-09-26

## 2022-09-26 ENCOUNTER — ANCILLARY PROCEDURE (OUTPATIENT)
Dept: ULTRASOUND IMAGING | Facility: HOSPITAL | Age: 47
End: 2022-09-26
Attending: OBSTETRICS & GYNECOLOGY
Payer: COMMERCIAL

## 2022-09-26 ENCOUNTER — OFFICE VISIT (OUTPATIENT)
Dept: MATERNAL FETAL MEDICINE | Facility: HOSPITAL | Age: 47
End: 2022-09-26
Attending: OBSTETRICS & GYNECOLOGY
Payer: COMMERCIAL

## 2022-09-26 DIAGNOSIS — O09.523 AMA (ADVANCED MATERNAL AGE) MULTIGRAVIDA 35+, THIRD TRIMESTER: ICD-10-CM

## 2022-09-26 DIAGNOSIS — O09.523 AMA (ADVANCED MATERNAL AGE) MULTIGRAVIDA 35+, THIRD TRIMESTER: Primary | ICD-10-CM

## 2022-09-26 PROCEDURE — 76819 FETAL BIOPHYS PROFIL W/O NST: CPT | Mod: 26 | Performed by: OBSTETRICS & GYNECOLOGY

## 2022-09-26 PROCEDURE — 99207 PR NO CHARGE LOS: CPT | Performed by: OBSTETRICS & GYNECOLOGY

## 2022-09-26 PROCEDURE — 76819 FETAL BIOPHYS PROFIL W/O NST: CPT

## 2022-09-27 ENCOUNTER — OFFICE VISIT (OUTPATIENT)
Dept: OBGYN | Facility: CLINIC | Age: 47
End: 2022-09-27
Payer: COMMERCIAL

## 2022-09-27 VITALS
DIASTOLIC BLOOD PRESSURE: 68 MMHG | HEART RATE: 95 BPM | OXYGEN SATURATION: 98 % | SYSTOLIC BLOOD PRESSURE: 112 MMHG | WEIGHT: 177 LBS | BODY MASS INDEX: 30.38 KG/M2

## 2022-09-27 DIAGNOSIS — O34.219 H/O CESAREAN SECTION COMPLICATING PREGNANCY: Primary | ICD-10-CM

## 2022-09-27 PROCEDURE — 99214 OFFICE O/P EST MOD 30 MIN: CPT | Performed by: OBSTETRICS & GYNECOLOGY

## 2022-09-27 NOTE — PROGRESS NOTES
S: The patient is here in in regard to her scheduled .  She is scheduled for Oct 5 at 7:30 at [facility].  She had a recent ultrasound showing an LGA fetus.  She is planning on breast feeding.    Outpatient Medications Prior to Visit   Medication Sig Dispense Refill     blood glucose (NO BRAND SPECIFIED) lancets standard Use to test blood sugar 4 times daily or as directed. 120 lancet 6     blood glucose (NO BRAND SPECIFIED) test strip Use to test blood sugar 4 times daily or as directed. 120 strip 6     blood glucose monitoring (NO BRAND SPECIFIED) meter device kit Use to test blood sugar 4 times daily or as directed. 1 kit 1     blood glucose monitoring (SOFTCLIX) lancets USE TO TEST BLOOD GLUCOSE FOUR TIMES DAILY AS DIRECTED.       cyanocobalamin (VITAMIN B-12) 1000 MCG tablet Take 1 tablet (1,000 mcg) by mouth daily 30 tablet 4     KETOSTIX test strip TEST EVERY DAY       magnesium citrate 1.745 GM/30ML solution Take 296 mLs by mouth once       Prenatal Vit-Fe Fumarate-FA (PRENATAL 19) 29-1 MG CHEW Take 1 tablet by mouth daily 60 tablet 3     sertraline (ZOLOFT) 50 MG tablet [SERTRALINE (ZOLOFT) 50 MG TABLET] Take 1 tablet (50 mg total) by mouth daily. 50 tablet 6     Urine Glucose-Ketones Test STRP 1 each by Other route daily 50 strip 3     vitamin C (ASCORBIC ACID) 250 MG tablet Take 1 tablet (250 mg) by mouth daily 30 tablet 4     vitamin D3 (CHOLECALCIFEROL) 50 mcg (2000 units) tablet Take 2 tablets (100 mcg) by mouth daily 60 tablet 1     ferrous sulfate (FEROSUL) 325 (65 Fe) MG tablet Take 1 tablet (325 mg) by mouth daily (with breakfast) (Patient not taking: No sig reported) 30 tablet 4     hydrOXYzine (VISTARIL) 50 MG capsule Take 1 capsule (50 mg) by mouth nightly as needed for itching or other (insomnia) (Patient not taking: No sig reported) 10 capsule 0     No facility-administered medications prior to visit.       Patient has No Known Allergies.    O:  /68 (BP Location: Right arm,  Patient Position: Sitting, Cuff Size: Adult Regular)   Pulse 95   Wt 80.3 kg (177 lb)   LMP 2022 (Exact Date)           Body mass index is 30.38 kg/m .    General: gravid WF, NAD    Assessment: 47 year old SWF  at 38 weeks gestation with  scheduled next week.    Plan:  [facility]AS information gone over.  We discussed arriving at 5:30 and usual length of stay of 2 days after .  We discussed outpatient lactation consult if needed once she's discharged.  Soap and Gatorade were given with instructions.  We discussed that the baby will have his blood sugars checked to see if he needs supplementation either with donor breast milk or formula.  Questions were answered to the best of my ability.    34 minutes spent on the date of the encounter doing chart review, review of test results, patient visit and documentation

## 2022-09-28 ENCOUNTER — PRENATAL OFFICE VISIT (OUTPATIENT)
Dept: MIDWIFE SERVICES | Facility: CLINIC | Age: 47
End: 2022-09-28
Payer: COMMERCIAL

## 2022-09-28 VITALS
BODY MASS INDEX: 30.9 KG/M2 | WEIGHT: 181 LBS | HEART RATE: 96 BPM | DIASTOLIC BLOOD PRESSURE: 60 MMHG | HEIGHT: 64 IN | SYSTOLIC BLOOD PRESSURE: 116 MMHG | OXYGEN SATURATION: 98 %

## 2022-09-28 DIAGNOSIS — O09.529 SUPERVISION OF HIGH-RISK PREGNANCY OF ELDERLY MULTIGRAVIDA: Primary | ICD-10-CM

## 2022-09-28 PROCEDURE — 99207 PR PRENATAL VISIT: CPT | Performed by: MIDWIFE

## 2022-09-28 PROCEDURE — 59426 ANTEPARTUM CARE ONLY: CPT | Performed by: MIDWIFE

## 2022-09-28 NOTE — PROGRESS NOTES
Marley presents to [facility] for a routine prenatal visit at 38 w 1d.  Plans repeat  with Dr. Black on 10/5/2022, next week.  GDM: only 2 levels elevated, no fasting levels elevated.     CNM on-call 10/5/2022 notified to support patient if able  Had preop visit with Dr. Black yesterday.  Feeling ready for baby. Lost mucous plug last Thursday   Discussed [facility]  Questions about covid testing prior.  Feeling excited about upcoming labor/birth.     Reviewed our recommendation that she take an iron supplement daily to boost her iron stores prior to birth   Pt is currently supplementing daily; discussed strategies to manage constipation.     Reviewed term labor precautions, warning signs and when/how to call the on-call CNM.

## 2022-09-29 DIAGNOSIS — Z01.812 PRE-PROCEDURE LAB EXAM: Primary | ICD-10-CM

## 2022-10-01 ENCOUNTER — HEALTH MAINTENANCE LETTER (OUTPATIENT)
Age: 47
End: 2022-10-01

## 2022-10-03 ENCOUNTER — DOCUMENTATION ONLY (OUTPATIENT)
Dept: MIDWIFE SERVICES | Facility: CLINIC | Age: 47
End: 2022-10-03

## 2022-10-03 ENCOUNTER — ANCILLARY PROCEDURE (OUTPATIENT)
Dept: ULTRASOUND IMAGING | Facility: HOSPITAL | Age: 47
End: 2022-10-03
Attending: OBSTETRICS & GYNECOLOGY
Payer: COMMERCIAL

## 2022-10-03 ENCOUNTER — OFFICE VISIT (OUTPATIENT)
Dept: MATERNAL FETAL MEDICINE | Facility: HOSPITAL | Age: 47
End: 2022-10-03
Attending: OBSTETRICS & GYNECOLOGY
Payer: COMMERCIAL

## 2022-10-03 DIAGNOSIS — O09.522 ADVANCED MATERNAL AGE IN MULTIGRAVIDA, SECOND TRIMESTER: ICD-10-CM

## 2022-10-03 DIAGNOSIS — O24.414 INSULIN CONTROLLED GESTATIONAL DIABETES MELLITUS (GDM) IN THIRD TRIMESTER: Primary | ICD-10-CM

## 2022-10-03 DIAGNOSIS — O09.523 AMA (ADVANCED MATERNAL AGE) MULTIGRAVIDA 35+, THIRD TRIMESTER: ICD-10-CM

## 2022-10-03 PROCEDURE — 76819 FETAL BIOPHYS PROFIL W/O NST: CPT

## 2022-10-03 PROCEDURE — 99207 PR NO CHARGE LOS: CPT | Performed by: OBSTETRICS & GYNECOLOGY

## 2022-10-03 PROCEDURE — 76819 FETAL BIOPHYS PROFIL W/O NST: CPT | Mod: 26 | Performed by: OBSTETRICS & GYNECOLOGY

## 2022-10-03 NOTE — PROGRESS NOTES
Please see the imaging tab for details of the ultrasound performed today.    Aide Tanner MD  Specialist in Maternal-Fetal Medicine

## 2022-10-04 ENCOUNTER — LAB (OUTPATIENT)
Dept: FAMILY MEDICINE | Facility: CLINIC | Age: 47
End: 2022-10-04
Attending: MIDWIFE
Payer: COMMERCIAL

## 2022-10-04 ENCOUNTER — ANESTHESIA EVENT (OUTPATIENT)
Dept: OBGYN | Facility: HOSPITAL | Age: 47
End: 2022-10-04
Payer: COMMERCIAL

## 2022-10-04 DIAGNOSIS — Z01.812 PRE-PROCEDURE LAB EXAM: ICD-10-CM

## 2022-10-04 LAB — SARS-COV-2 RNA RESP QL NAA+PROBE: NEGATIVE

## 2022-10-04 PROCEDURE — U0003 INFECTIOUS AGENT DETECTION BY NUCLEIC ACID (DNA OR RNA); SEVERE ACUTE RESPIRATORY SYNDROME CORONAVIRUS 2 (SARS-COV-2) (CORONAVIRUS DISEASE [COVID-19]), AMPLIFIED PROBE TECHNIQUE, MAKING USE OF HIGH THROUGHPUT TECHNOLOGIES AS DESCRIBED BY CMS-2020-01-R: HCPCS

## 2022-10-04 PROCEDURE — U0005 INFEC AGEN DETEC AMPLI PROBE: HCPCS

## 2022-10-05 ENCOUNTER — TELEPHONE (OUTPATIENT)
Dept: MIDWIFE SERVICES | Facility: CLINIC | Age: 47
End: 2022-10-05

## 2022-10-05 ENCOUNTER — ANESTHESIA (OUTPATIENT)
Dept: OBGYN | Facility: HOSPITAL | Age: 47
End: 2022-10-05
Payer: COMMERCIAL

## 2022-10-05 ENCOUNTER — HOSPITAL ENCOUNTER (INPATIENT)
Facility: HOSPITAL | Age: 47
LOS: 3 days | Discharge: HOME HEALTH | End: 2022-10-08
Attending: OBSTETRICS & GYNECOLOGY | Admitting: OBSTETRICS & GYNECOLOGY
Payer: COMMERCIAL

## 2022-10-05 LAB
ABO/RH(D): NORMAL
ANTIBODY SCREEN: NEGATIVE
HGB BLD-MCNC: 13.1 G/DL (ref 11.7–15.7)
SPECIMEN EXPIRATION DATE: NORMAL
T PALLIDUM AB SER QL: NONREACTIVE

## 2022-10-05 PROCEDURE — 86901 BLOOD TYPING SEROLOGIC RH(D): CPT | Performed by: OBSTETRICS & GYNECOLOGY

## 2022-10-05 PROCEDURE — 250N000011 HC RX IP 250 OP 636: Performed by: ANESTHESIOLOGY

## 2022-10-05 PROCEDURE — C9290 INJ, BUPIVACAINE LIPOSOME: HCPCS | Performed by: ANESTHESIOLOGY

## 2022-10-05 PROCEDURE — 272N000001 HC OR GENERAL SUPPLY STERILE: Performed by: OBSTETRICS & GYNECOLOGY

## 2022-10-05 PROCEDURE — 250N000013 HC RX MED GY IP 250 OP 250 PS 637: Performed by: OBSTETRICS & GYNECOLOGY

## 2022-10-05 PROCEDURE — 999N000249 HC STATISTIC C-SECTION ON UNIT

## 2022-10-05 PROCEDURE — 250N000009 HC RX 250: Performed by: NURSE ANESTHETIST, CERTIFIED REGISTERED

## 2022-10-05 PROCEDURE — 258N000003 HC RX IP 258 OP 636: Performed by: NURSE ANESTHETIST, CERTIFIED REGISTERED

## 2022-10-05 PROCEDURE — 120N000001 HC R&B MED SURG/OB

## 2022-10-05 PROCEDURE — 710N000010 HC RECOVERY PHASE 1, LEVEL 2, PER MIN: Performed by: OBSTETRICS & GYNECOLOGY

## 2022-10-05 PROCEDURE — 250N000011 HC RX IP 250 OP 636: Performed by: OBSTETRICS & GYNECOLOGY

## 2022-10-05 PROCEDURE — 360N000076 HC SURGERY LEVEL 3, PER MIN: Performed by: OBSTETRICS & GYNECOLOGY

## 2022-10-05 PROCEDURE — 85018 HEMOGLOBIN: CPT | Performed by: OBSTETRICS & GYNECOLOGY

## 2022-10-05 PROCEDURE — 370N000017 HC ANESTHESIA TECHNICAL FEE, PER MIN: Performed by: OBSTETRICS & GYNECOLOGY

## 2022-10-05 PROCEDURE — 86780 TREPONEMA PALLIDUM: CPT | Performed by: OBSTETRICS & GYNECOLOGY

## 2022-10-05 PROCEDURE — 258N000003 HC RX IP 258 OP 636: Performed by: OBSTETRICS & GYNECOLOGY

## 2022-10-05 PROCEDURE — 36415 COLL VENOUS BLD VENIPUNCTURE: CPT | Performed by: OBSTETRICS & GYNECOLOGY

## 2022-10-05 PROCEDURE — 250N000011 HC RX IP 250 OP 636: Performed by: NURSE ANESTHETIST, CERTIFIED REGISTERED

## 2022-10-05 PROCEDURE — 86850 RBC ANTIBODY SCREEN: CPT | Performed by: OBSTETRICS & GYNECOLOGY

## 2022-10-05 RX ORDER — PROCHLORPERAZINE 25 MG
25 SUPPOSITORY, RECTAL RECTAL EVERY 12 HOURS PRN
Status: DISCONTINUED | OUTPATIENT
Start: 2022-10-05 | End: 2022-10-08 | Stop reason: HOSPADM

## 2022-10-05 RX ORDER — MISOPROSTOL 200 UG/1
800 TABLET ORAL
Status: DISCONTINUED | OUTPATIENT
Start: 2022-10-05 | End: 2022-10-05 | Stop reason: HOSPADM

## 2022-10-05 RX ORDER — NALOXONE HYDROCHLORIDE 0.4 MG/ML
0.2 INJECTION, SOLUTION INTRAMUSCULAR; INTRAVENOUS; SUBCUTANEOUS
Status: DISCONTINUED | OUTPATIENT
Start: 2022-10-05 | End: 2022-10-08 | Stop reason: HOSPADM

## 2022-10-05 RX ORDER — CITRIC ACID/SODIUM CITRATE 334-500MG
30 SOLUTION, ORAL ORAL
Status: COMPLETED | OUTPATIENT
Start: 2022-10-05 | End: 2022-10-05

## 2022-10-05 RX ORDER — ONDANSETRON 4 MG/1
4 TABLET, ORALLY DISINTEGRATING ORAL EVERY 30 MIN PRN
Status: DISCONTINUED | OUTPATIENT
Start: 2022-10-05 | End: 2022-10-08

## 2022-10-05 RX ORDER — MORPHINE SULFATE 1 MG/ML
INJECTION, SOLUTION EPIDURAL; INTRATHECAL; INTRAVENOUS
Status: COMPLETED | OUTPATIENT
Start: 2022-10-05 | End: 2022-10-05

## 2022-10-05 RX ORDER — MISOPROSTOL 200 UG/1
400 TABLET ORAL
Status: DISCONTINUED | OUTPATIENT
Start: 2022-10-05 | End: 2022-10-05 | Stop reason: HOSPADM

## 2022-10-05 RX ORDER — ACETAMINOPHEN 325 MG/1
975 TABLET ORAL EVERY 6 HOURS
Status: DISPENSED | OUTPATIENT
Start: 2022-10-05 | End: 2022-10-08

## 2022-10-05 RX ORDER — AMOXICILLIN 250 MG
2 CAPSULE ORAL 2 TIMES DAILY
Status: DISCONTINUED | OUTPATIENT
Start: 2022-10-05 | End: 2022-10-08 | Stop reason: HOSPADM

## 2022-10-05 RX ORDER — OXYTOCIN 10 [USP'U]/ML
10 INJECTION, SOLUTION INTRAMUSCULAR; INTRAVENOUS
Status: DISCONTINUED | OUTPATIENT
Start: 2022-10-05 | End: 2022-10-05 | Stop reason: HOSPADM

## 2022-10-05 RX ORDER — BISACODYL 10 MG
10 SUPPOSITORY, RECTAL RECTAL DAILY PRN
Status: DISCONTINUED | OUTPATIENT
Start: 2022-10-07 | End: 2022-10-08 | Stop reason: HOSPADM

## 2022-10-05 RX ORDER — METHYLERGONOVINE MALEATE 0.2 MG/ML
200 INJECTION INTRAVENOUS
Status: DISCONTINUED | OUTPATIENT
Start: 2022-10-05 | End: 2022-10-08 | Stop reason: HOSPADM

## 2022-10-05 RX ORDER — BUPIVACAINE HYDROCHLORIDE 2.5 MG/ML
INJECTION, SOLUTION EPIDURAL; INFILTRATION; INTRACAUDAL
Status: DISCONTINUED | OUTPATIENT
Start: 2022-10-05 | End: 2022-10-05

## 2022-10-05 RX ORDER — OXYCODONE HYDROCHLORIDE 5 MG/1
5 TABLET ORAL EVERY 4 HOURS PRN
Status: DISCONTINUED | OUTPATIENT
Start: 2022-10-05 | End: 2022-10-08

## 2022-10-05 RX ORDER — EPHEDRINE SULFATE 50 MG/ML
5 INJECTION, SOLUTION INTRAMUSCULAR; INTRAVENOUS; SUBCUTANEOUS
Status: DISCONTINUED | OUTPATIENT
Start: 2022-10-05 | End: 2022-10-05 | Stop reason: HOSPADM

## 2022-10-05 RX ORDER — NALOXONE HYDROCHLORIDE 0.4 MG/ML
0.4 INJECTION, SOLUTION INTRAMUSCULAR; INTRAVENOUS; SUBCUTANEOUS
Status: DISCONTINUED | OUTPATIENT
Start: 2022-10-05 | End: 2022-10-08 | Stop reason: HOSPADM

## 2022-10-05 RX ORDER — LIDOCAINE 40 MG/G
CREAM TOPICAL
Status: DISCONTINUED | OUTPATIENT
Start: 2022-10-05 | End: 2022-10-05 | Stop reason: HOSPADM

## 2022-10-05 RX ORDER — METOCLOPRAMIDE 10 MG/1
10 TABLET ORAL EVERY 6 HOURS PRN
Status: DISCONTINUED | OUTPATIENT
Start: 2022-10-05 | End: 2022-10-08 | Stop reason: HOSPADM

## 2022-10-05 RX ORDER — MODIFIED LANOLIN
OINTMENT (GRAM) TOPICAL
Status: DISCONTINUED | OUTPATIENT
Start: 2022-10-05 | End: 2022-10-08 | Stop reason: HOSPADM

## 2022-10-05 RX ORDER — HYDROMORPHONE HYDROCHLORIDE 1 MG/ML
0.2 INJECTION, SOLUTION INTRAMUSCULAR; INTRAVENOUS; SUBCUTANEOUS EVERY 5 MIN PRN
Status: DISCONTINUED | OUTPATIENT
Start: 2022-10-05 | End: 2022-10-08

## 2022-10-05 RX ORDER — SODIUM CHLORIDE, SODIUM LACTATE, POTASSIUM CHLORIDE, CALCIUM CHLORIDE 600; 310; 30; 20 MG/100ML; MG/100ML; MG/100ML; MG/100ML
INJECTION, SOLUTION INTRAVENOUS CONTINUOUS
Status: DISCONTINUED | OUTPATIENT
Start: 2022-10-05 | End: 2022-10-05 | Stop reason: HOSPADM

## 2022-10-05 RX ORDER — OXYTOCIN 10 [USP'U]/ML
10 INJECTION, SOLUTION INTRAMUSCULAR; INTRAVENOUS
Status: DISCONTINUED | OUTPATIENT
Start: 2022-10-05 | End: 2022-10-08 | Stop reason: HOSPADM

## 2022-10-05 RX ORDER — SODIUM CHLORIDE, SODIUM LACTATE, POTASSIUM CHLORIDE, CALCIUM CHLORIDE 600; 310; 30; 20 MG/100ML; MG/100ML; MG/100ML; MG/100ML
INJECTION, SOLUTION INTRAVENOUS CONTINUOUS
Status: DISCONTINUED | OUTPATIENT
Start: 2022-10-05 | End: 2022-10-08

## 2022-10-05 RX ORDER — MISOPROSTOL 200 UG/1
800 TABLET ORAL
Status: DISCONTINUED | OUTPATIENT
Start: 2022-10-05 | End: 2022-10-08 | Stop reason: HOSPADM

## 2022-10-05 RX ORDER — OXYTOCIN/0.9 % SODIUM CHLORIDE 30/500 ML
PLASTIC BAG, INJECTION (ML) INTRAVENOUS CONTINUOUS PRN
Status: DISCONTINUED | OUTPATIENT
Start: 2022-10-05 | End: 2022-10-05

## 2022-10-05 RX ORDER — FENTANYL CITRATE 50 UG/ML
50 INJECTION, SOLUTION INTRAMUSCULAR; INTRAVENOUS
Status: DISCONTINUED | OUTPATIENT
Start: 2022-10-05 | End: 2022-10-05 | Stop reason: CLARIF

## 2022-10-05 RX ORDER — SIMETHICONE 80 MG
80 TABLET,CHEWABLE ORAL 4 TIMES DAILY PRN
Status: DISCONTINUED | OUTPATIENT
Start: 2022-10-05 | End: 2022-10-08 | Stop reason: HOSPADM

## 2022-10-05 RX ORDER — OXYTOCIN/0.9 % SODIUM CHLORIDE 30/500 ML
100-340 PLASTIC BAG, INJECTION (ML) INTRAVENOUS CONTINUOUS PRN
Status: DISCONTINUED | OUTPATIENT
Start: 2022-10-05 | End: 2022-10-08 | Stop reason: HOSPADM

## 2022-10-05 RX ORDER — NALBUPHINE HYDROCHLORIDE 10 MG/ML
2.5-5 INJECTION, SOLUTION INTRAMUSCULAR; INTRAVENOUS; SUBCUTANEOUS EVERY 6 HOURS PRN
Status: DISCONTINUED | OUTPATIENT
Start: 2022-10-05 | End: 2022-10-08 | Stop reason: HOSPADM

## 2022-10-05 RX ORDER — HYDROCORTISONE 25 MG/G
CREAM TOPICAL 3 TIMES DAILY PRN
Status: DISCONTINUED | OUTPATIENT
Start: 2022-10-05 | End: 2022-10-08 | Stop reason: HOSPADM

## 2022-10-05 RX ORDER — ONDANSETRON 2 MG/ML
INJECTION INTRAMUSCULAR; INTRAVENOUS PRN
Status: DISCONTINUED | OUTPATIENT
Start: 2022-10-05 | End: 2022-10-05

## 2022-10-05 RX ORDER — CEFAZOLIN SODIUM/WATER 2 G/20 ML
2 SYRINGE (ML) INTRAVENOUS
Status: COMPLETED | OUTPATIENT
Start: 2022-10-05 | End: 2022-10-05

## 2022-10-05 RX ORDER — CARBOPROST TROMETHAMINE 250 UG/ML
250 INJECTION, SOLUTION INTRAMUSCULAR
Status: DISCONTINUED | OUTPATIENT
Start: 2022-10-05 | End: 2022-10-08 | Stop reason: HOSPADM

## 2022-10-05 RX ORDER — METHYLERGONOVINE MALEATE 0.2 MG/ML
200 INJECTION INTRAVENOUS
Status: DISCONTINUED | OUTPATIENT
Start: 2022-10-05 | End: 2022-10-05 | Stop reason: HOSPADM

## 2022-10-05 RX ORDER — LIDOCAINE 40 MG/G
CREAM TOPICAL
Status: DISCONTINUED | OUTPATIENT
Start: 2022-10-05 | End: 2022-10-05 | Stop reason: CLARIF

## 2022-10-05 RX ORDER — SODIUM CHLORIDE, SODIUM LACTATE, POTASSIUM CHLORIDE, CALCIUM CHLORIDE 600; 310; 30; 20 MG/100ML; MG/100ML; MG/100ML; MG/100ML
INJECTION, SOLUTION INTRAVENOUS CONTINUOUS PRN
Status: DISCONTINUED | OUTPATIENT
Start: 2022-10-05 | End: 2022-10-05

## 2022-10-05 RX ORDER — MORPHINE SULFATE 0.5 MG/ML
150 INJECTION, SOLUTION EPIDURAL; INTRATHECAL; INTRAVENOUS ONCE
Status: DISCONTINUED | OUTPATIENT
Start: 2022-10-05 | End: 2022-10-05 | Stop reason: CLARIF

## 2022-10-05 RX ORDER — MISOPROSTOL 200 UG/1
400 TABLET ORAL
Status: DISCONTINUED | OUTPATIENT
Start: 2022-10-05 | End: 2022-10-08 | Stop reason: HOSPADM

## 2022-10-05 RX ORDER — SODIUM CHLORIDE, SODIUM LACTATE, POTASSIUM CHLORIDE, CALCIUM CHLORIDE 600; 310; 30; 20 MG/100ML; MG/100ML; MG/100ML; MG/100ML
INJECTION, SOLUTION INTRAVENOUS CONTINUOUS
Status: DISCONTINUED | OUTPATIENT
Start: 2022-10-05 | End: 2022-10-05 | Stop reason: CLARIF

## 2022-10-05 RX ORDER — ONDANSETRON 2 MG/ML
4 INJECTION INTRAMUSCULAR; INTRAVENOUS EVERY 6 HOURS PRN
Status: DISCONTINUED | OUTPATIENT
Start: 2022-10-05 | End: 2022-10-08 | Stop reason: HOSPADM

## 2022-10-05 RX ORDER — CARBOPROST TROMETHAMINE 250 UG/ML
250 INJECTION, SOLUTION INTRAMUSCULAR
Status: DISCONTINUED | OUTPATIENT
Start: 2022-10-05 | End: 2022-10-05 | Stop reason: HOSPADM

## 2022-10-05 RX ORDER — FENTANYL CITRATE 50 UG/ML
25 INJECTION, SOLUTION INTRAMUSCULAR; INTRAVENOUS EVERY 5 MIN PRN
Status: DISCONTINUED | OUTPATIENT
Start: 2022-10-05 | End: 2022-10-08

## 2022-10-05 RX ORDER — OXYTOCIN/0.9 % SODIUM CHLORIDE 30/500 ML
340 PLASTIC BAG, INJECTION (ML) INTRAVENOUS CONTINUOUS PRN
Status: DISCONTINUED | OUTPATIENT
Start: 2022-10-05 | End: 2022-10-08 | Stop reason: HOSPADM

## 2022-10-05 RX ORDER — ONDANSETRON 4 MG/1
4 TABLET, ORALLY DISINTEGRATING ORAL EVERY 6 HOURS PRN
Status: DISCONTINUED | OUTPATIENT
Start: 2022-10-05 | End: 2022-10-05 | Stop reason: HOSPADM

## 2022-10-05 RX ORDER — CEFAZOLIN SODIUM/WATER 2 G/20 ML
2 SYRINGE (ML) INTRAVENOUS SEE ADMIN INSTRUCTIONS
Status: DISCONTINUED | OUTPATIENT
Start: 2022-10-05 | End: 2022-10-05 | Stop reason: CLARIF

## 2022-10-05 RX ORDER — OXYTOCIN/0.9 % SODIUM CHLORIDE 30/500 ML
340 PLASTIC BAG, INJECTION (ML) INTRAVENOUS CONTINUOUS PRN
Status: DISCONTINUED | OUTPATIENT
Start: 2022-10-05 | End: 2022-10-05 | Stop reason: HOSPADM

## 2022-10-05 RX ORDER — METOCLOPRAMIDE HYDROCHLORIDE 5 MG/ML
10 INJECTION INTRAMUSCULAR; INTRAVENOUS EVERY 6 HOURS PRN
Status: DISCONTINUED | OUTPATIENT
Start: 2022-10-05 | End: 2022-10-08 | Stop reason: HOSPADM

## 2022-10-05 RX ORDER — OXYCODONE HYDROCHLORIDE 5 MG/1
5 TABLET ORAL EVERY 4 HOURS PRN
Status: DISCONTINUED | OUTPATIENT
Start: 2022-10-05 | End: 2022-10-08 | Stop reason: HOSPADM

## 2022-10-05 RX ORDER — ONDANSETRON 2 MG/ML
4 INJECTION INTRAMUSCULAR; INTRAVENOUS EVERY 6 HOURS PRN
Status: DISCONTINUED | OUTPATIENT
Start: 2022-10-05 | End: 2022-10-05 | Stop reason: HOSPADM

## 2022-10-05 RX ORDER — ONDANSETRON 4 MG/1
4 TABLET, ORALLY DISINTEGRATING ORAL EVERY 6 HOURS PRN
Status: DISCONTINUED | OUTPATIENT
Start: 2022-10-05 | End: 2022-10-08 | Stop reason: HOSPADM

## 2022-10-05 RX ORDER — ACETAMINOPHEN 325 MG/1
975 TABLET ORAL ONCE
Status: COMPLETED | OUTPATIENT
Start: 2022-10-05 | End: 2022-10-05

## 2022-10-05 RX ORDER — DEXTROSE, SODIUM CHLORIDE, SODIUM LACTATE, POTASSIUM CHLORIDE, AND CALCIUM CHLORIDE 5; .6; .31; .03; .02 G/100ML; G/100ML; G/100ML; G/100ML; G/100ML
INJECTION, SOLUTION INTRAVENOUS CONTINUOUS
Status: DISCONTINUED | OUTPATIENT
Start: 2022-10-05 | End: 2022-10-08 | Stop reason: HOSPADM

## 2022-10-05 RX ORDER — ACETAMINOPHEN 325 MG/1
650 TABLET ORAL EVERY 4 HOURS PRN
Status: DISCONTINUED | OUTPATIENT
Start: 2022-10-08 | End: 2022-10-08 | Stop reason: HOSPADM

## 2022-10-05 RX ORDER — ONDANSETRON 2 MG/ML
4 INJECTION INTRAMUSCULAR; INTRAVENOUS EVERY 30 MIN PRN
Status: DISCONTINUED | OUTPATIENT
Start: 2022-10-05 | End: 2022-10-08

## 2022-10-05 RX ORDER — LIDOCAINE 40 MG/G
CREAM TOPICAL
Status: DISCONTINUED | OUTPATIENT
Start: 2022-10-05 | End: 2022-10-08 | Stop reason: HOSPADM

## 2022-10-05 RX ORDER — IBUPROFEN 800 MG/1
800 TABLET, FILM COATED ORAL EVERY 6 HOURS PRN
Status: DISCONTINUED | OUTPATIENT
Start: 2022-10-05 | End: 2022-10-08 | Stop reason: HOSPADM

## 2022-10-05 RX ORDER — KETOROLAC TROMETHAMINE 30 MG/ML
INJECTION, SOLUTION INTRAMUSCULAR; INTRAVENOUS PRN
Status: DISCONTINUED | OUTPATIENT
Start: 2022-10-05 | End: 2022-10-05

## 2022-10-05 RX ORDER — KETOROLAC TROMETHAMINE 30 MG/ML
30 INJECTION, SOLUTION INTRAMUSCULAR; INTRAVENOUS EVERY 6 HOURS
Status: DISPENSED | OUTPATIENT
Start: 2022-10-05 | End: 2022-10-06

## 2022-10-05 RX ORDER — PROCHLORPERAZINE MALEATE 10 MG
10 TABLET ORAL EVERY 6 HOURS PRN
Status: DISCONTINUED | OUTPATIENT
Start: 2022-10-05 | End: 2022-10-08 | Stop reason: HOSPADM

## 2022-10-05 RX ORDER — BUPIVACAINE HYDROCHLORIDE 7.5 MG/ML
INJECTION, SOLUTION INTRASPINAL
Status: COMPLETED | OUTPATIENT
Start: 2022-10-05 | End: 2022-10-05

## 2022-10-05 RX ORDER — AMOXICILLIN 250 MG
1 CAPSULE ORAL 2 TIMES DAILY
Status: DISCONTINUED | OUTPATIENT
Start: 2022-10-05 | End: 2022-10-08 | Stop reason: HOSPADM

## 2022-10-05 RX ADMIN — BUPIVACAINE HYDROCHLORIDE 20 ML: 2.5 INJECTION, SOLUTION EPIDURAL; INFILTRATION; INTRACAUDAL at 04:25

## 2022-10-05 RX ADMIN — ACETAMINOPHEN 975 MG: 325 TABLET, FILM COATED ORAL at 06:45

## 2022-10-05 RX ADMIN — NALBUPHINE HYDROCHLORIDE 5 MG: 10 INJECTION, SOLUTION INTRAMUSCULAR; INTRAVENOUS; SUBCUTANEOUS at 15:32

## 2022-10-05 RX ADMIN — PHENYLEPHRINE HYDROCHLORIDE 200 MCG: 10 INJECTION INTRAVENOUS at 03:43

## 2022-10-05 RX ADMIN — PHENYLEPHRINE HYDROCHLORIDE 100 MCG: 10 INJECTION INTRAVENOUS at 03:32

## 2022-10-05 RX ADMIN — PHENYLEPHRINE HYDROCHLORIDE 200 MCG: 10 INJECTION INTRAVENOUS at 03:40

## 2022-10-05 RX ADMIN — ACETAMINOPHEN 975 MG: 325 TABLET, FILM COATED ORAL at 02:54

## 2022-10-05 RX ADMIN — ONDANSETRON 4 MG: 2 INJECTION INTRAMUSCULAR; INTRAVENOUS at 03:15

## 2022-10-05 RX ADMIN — BUPIVACAINE HYDROCHLORIDE IN DEXTROSE 1.6 ML: 7.5 INJECTION, SOLUTION SUBARACHNOID at 03:07

## 2022-10-05 RX ADMIN — ACETAMINOPHEN 975 MG: 325 TABLET, FILM COATED ORAL at 14:00

## 2022-10-05 RX ADMIN — PHENYLEPHRINE HYDROCHLORIDE 100 MCG: 10 INJECTION INTRAVENOUS at 03:25

## 2022-10-05 RX ADMIN — BUPIVACAINE 20 ML: 13.3 INJECTION, SUSPENSION, LIPOSOMAL INFILTRATION at 04:25

## 2022-10-05 RX ADMIN — KETOROLAC TROMETHAMINE 30 MG: 30 INJECTION, SOLUTION INTRAMUSCULAR at 23:36

## 2022-10-05 RX ADMIN — SODIUM CHLORIDE, POTASSIUM CHLORIDE, SODIUM LACTATE AND CALCIUM CHLORIDE: 600; 310; 30; 20 INJECTION, SOLUTION INTRAVENOUS at 03:05

## 2022-10-05 RX ADMIN — SENNOSIDES AND DOCUSATE SODIUM 1 TABLET: 50; 8.6 TABLET ORAL at 20:51

## 2022-10-05 RX ADMIN — ACETAMINOPHEN 975 MG: 325 TABLET, FILM COATED ORAL at 20:03

## 2022-10-05 RX ADMIN — SERTRALINE HYDROCHLORIDE 50 MG: 50 TABLET ORAL at 20:51

## 2022-10-05 RX ADMIN — Medication 2 G: at 03:05

## 2022-10-05 RX ADMIN — SENNOSIDES AND DOCUSATE SODIUM 1 TABLET: 50; 8.6 TABLET ORAL at 11:05

## 2022-10-05 RX ADMIN — SODIUM CHLORIDE, POTASSIUM CHLORIDE, SODIUM LACTATE AND CALCIUM CHLORIDE: 600; 310; 30; 20 INJECTION, SOLUTION INTRAVENOUS at 04:15

## 2022-10-05 RX ADMIN — AZITHROMYCIN MONOHYDRATE 500 MG: 500 INJECTION, POWDER, LYOPHILIZED, FOR SOLUTION INTRAVENOUS at 03:10

## 2022-10-05 RX ADMIN — Medication 300 ML/HR: at 03:30

## 2022-10-05 RX ADMIN — PHENYLEPHRINE HYDROCHLORIDE 100 MCG: 10 INJECTION INTRAVENOUS at 03:28

## 2022-10-05 RX ADMIN — PHENYLEPHRINE HYDROCHLORIDE 0.5 MCG/KG/MIN: 10 INJECTION INTRAVENOUS at 03:09

## 2022-10-05 RX ADMIN — Medication 0.15 MG: at 03:07

## 2022-10-05 RX ADMIN — NALBUPHINE HYDROCHLORIDE 5 MG: 10 INJECTION, SOLUTION INTRAMUSCULAR; INTRAVENOUS; SUBCUTANEOUS at 08:48

## 2022-10-05 RX ADMIN — KETOROLAC TROMETHAMINE 30 MG: 30 INJECTION, SOLUTION INTRAMUSCULAR at 11:04

## 2022-10-05 RX ADMIN — SODIUM CITRATE AND CITRIC ACID MONOHYDRATE 30 ML: 500; 334 SOLUTION ORAL at 02:54

## 2022-10-05 RX ADMIN — KETOROLAC TROMETHAMINE 30 MG: 30 INJECTION, SOLUTION INTRAMUSCULAR at 17:37

## 2022-10-05 RX ADMIN — KETOROLAC TROMETHAMINE 30 MG: 30 INJECTION, SOLUTION INTRAMUSCULAR at 04:13

## 2022-10-05 ASSESSMENT — ACTIVITIES OF DAILY LIVING (ADL)
FALL_HISTORY_WITHIN_LAST_SIX_MONTHS: NO
ADLS_ACUITY_SCORE: 18
DRESSING/BATHING_DIFFICULTY: NO
ADLS_ACUITY_SCORE: 18
WEAR_GLASSES_OR_BLIND: NO
ADLS_ACUITY_SCORE: 24
ADLS_ACUITY_SCORE: 18
WALKING_OR_CLIMBING_STAIRS_DIFFICULTY: NO
CHANGE_IN_FUNCTIONAL_STATUS_SINCE_ONSET_OF_CURRENT_ILLNESS/INJURY: NO
ADLS_ACUITY_SCORE: 18
TOILETING_ISSUES: NO
CONCENTRATING,_REMEMBERING_OR_MAKING_DECISIONS_DIFFICULTY: NO
DOING_ERRANDS_INDEPENDENTLY_DIFFICULTY: NO
ADLS_ACUITY_SCORE: 18
ADLS_ACUITY_SCORE: 18
DIFFICULTY_EATING/SWALLOWING: NO

## 2022-10-05 NOTE — ANESTHESIA PROCEDURE NOTES
TAP Procedure Note    Pre-Procedure   Staff -        Anesthesiologist:  Austin Arbams MD       Performed By: anesthesiologist       Location: OR       Procedure Start/Stop Times: 10/5/2022 4:25 AM and 10/5/2022 4:30 AM       Pre-Anesthestic Checklist: patient identified, IV checked, site marked, risks and benefits discussed, informed consent, monitors and equipment checked, pre-op evaluation, at physician/surgeon's request and post-op pain management  Timeout:       Correct Patient: Yes        Correct Procedure: Yes        Correct Site: Yes        Correct Position: Yes        Correct Laterality: Yes        Site Marked: Yes  Procedure Documentation  Procedure: TAP       Laterality: bilateral       Patient Position: supine       Patient Prep/Sterile Barriers: sterile gloves, mask       Skin prep: Chloraprep       Needle Type: insulated       Needle Gauge: 20.        Needle Length (Inches): 6        Ultrasound guided       1. Ultrasound was used to identify targeted nerve, plexus, vascular marker, or fascial plane and place a needle adjacent to it in real-time.       2. Ultrasound was used to visualize the spread of anesthetic in close proximity to the above referenced structure.       3. A permanent image is entered into the patient's record.       4. The visualized anatomic structures appeared normal.       5. There were no apparent abnormal pathologic findings.    Assessment/Narrative         The placement was negative for: blood aspirated, painful injection and site bleeding       Paresthesias: No.       Bolus given via needle..        Secured via.        Insertion/Infusion Method: Single Shot       Complications: none       Injection made incrementally with aspirations every 3 mL.    Medication(s) Administered   Bupivacaine 0.25% PF (Infiltration) - Infiltration   20 mL - 10/5/2022 4:25:00 AM  Bupivacaine liposome (Exparel) 1.3% LA inj susp (Infiltration) - Infiltration   20 mL - 10/5/2022 4:25:00  AM  Medication Administration Time: 10/5/2022 4:25 AM

## 2022-10-05 NOTE — PROCEDURES
Section Operative Report    Preoperative Diagnosis: Prior C/S x 1    Postoperative Diagnosis: Prior C/S x 1    Procedure: Repeat Low transverse  section with 2 layer closure    Uterine Extensions: None    Surgeon:  Georgia Low MD     Assistant: Tech    Anesthesia: spinal     Delivery QBL (mL): 897 ml    Uterine Atony: None    Findings:   1. Amniotic fluid - Clear  2. Cephalic presentation  3. Normal uterus, tubes and ovaries bilaterally  4. Live male infant - Parish  5. Apgars of 8 at one minute, 9 at 5 minutes  6. Weight - 9 Ibs 3.4 oz  7. Normal appearing placenta  8. Good hemostasis  9. Counts correct x 2    Drains: Bella catheter.  Pathology: None  Complications: None    Procedure:    Patient was met preoperatively where we discussed the procedure and the risks associated with the procedure.  She understood these to include but not limited to injury to adjacent organs including bowel, bladder, ureter, infection and bleeding. Understanding these risks her consents were signed.      She was brought to the operating room in stable condition.  After induction of a spinal anesthetic, fetal heart tones were checked and were stable. A bella catheter was placed. She was carefully prepped and draped in the typical sterile fashion for the procedure.  A timeout was then performed.      A Pfannenstiel skin incision was made and carried down to the rectus fascia which was incised in the midline and carried out bilaterally.  The superior and inferior aspects of the rectus fascia were elevated up and the underlying rectus muscles dissected off with sharp and blunt techniques.  The rectus muscles were then  at the midline and the peritoneum was identified and entered bluntly.  This incision was extended.  A bladder blade was introduced. The vesicouterine peritoneum was identified and a bladder flap was created.  A low transverse uterine incision was made revealing clear amniotic fluid.  The  baby's head was then delivered. There was a spontaneous cry and therefore bulb suction was performed. The remainder of the infant easily delivered. The cord was clamped x 2 and cut and the infant handed off to waiting nursing personnel.    The placenta was then manually removed from the uterus.  The uterus was exteriorized, covered with a moist laparotomy sponge and cleared of all clots and debris.  The uterine incision was then closed with #1-chromic from both angles in a running locking fashion. The incision was irrigated and noted to be hemostatic. The bladder flap was then reapproximated with 2-0 vicryl. The uterus was returned to the abdominopelvic cavity.  The pericolic gutters were cleared of all clots and debris.  The uterine incision was again inspected and noted to be hemostatic.  The peritoneum was now closed with 3.0 vicryl suture superiorly to inferiorly.  The rectus muscles were made hemostatic with the use of electrocautery and brought together with vertical mattress sutures of 2.0 chromic. The fascia was brought together with 0-PDS from both angles in a running nonlocking fashion and met at the midline. The subcutaneous tissues were irrigated, made hemostatic with use of electrocautery and brought together with 3-0 plain gut suture.  Skin was closed with 4.0 monocryl .  Patient tolerated this procedure well.  Needle, instrument and lap counts were correct x two.    Georgia Low MD     CC: Dr. Black
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
## 2022-10-05 NOTE — LACTATION NOTE
"This note was copied from a baby's chart.  This writer met with Marley to assist with latching infant onto the breast.  Marley complains of pain in her hands related to her carpel tunnel.  This writer assisted placing infant at the breast in the football hold. This writer \"sandwiched\" Marley's breast tissue and brought infant to the breast.  Infant easily latched onto the breast.  This writer needed to hold the breast tissue the entire feeding in order for infant to sustain the latch.  Infant actively, rhythmically sucked with swallows heard.  Infant was able to latch onto both breasts.  FOB taught how to hold Marley's breast to help infant sustain a latch.  Marley to call for assistance, prn.  Hand expression taught and Marley had large drops of colostrum expressed.  Infant to breastfeed 8-12+ times in 24 hours.  If unable to latch, Marley to hand express and spoon feed infant expressed colostrum.  Marley verbalizes understanding of all education given.  She denies any further questions.      "

## 2022-10-05 NOTE — PROGRESS NOTES
"Csection - Post operative day 0    ASSESSMENT: PLAN:   POD#0    Repeat csection   spontaneous rupture of membranes  Doing well    Continue routine cares   aniticipate discharge in am    SUBJECTIVE:    The patient feels well: Catheter is out, bleeding decreased, tolerating normal diet, and passing flatus.  Pain is well controlled. The patient has no emotional concerns.  The baby is well and being fed    OBJECTIVE:  /60   Pulse 75   Temp 97.9  F (36.6  C) (Oral)   Resp 14   Ht 1.626 m (5' 4\")   Wt 82.1 kg (181 lb)   LMP 01/04/2022 (Exact Date)   SpO2 99%   Breastfeeding Unknown   BMI 31.07 kg/m      Fundus firm  Incision- dressing dry   Ext- nontender      Lab  hgb pending      Bella Hunter MD  [facility]  [phone]    "

## 2022-10-05 NOTE — LACTATION NOTE
"This note was copied from a baby's chart.  Lactation to room to assist with a feeding. [place] \"Parish\" was skin-to-skin with mother, Marley. The best positions for a deep and comfortable latch were reviewed. Marley was assisted with hand expression and Parish was finger fed a small amount of colostrum. A breastfeeding attempt was made, however, Parish was sleepy, gagging, and reluctant to latch. Parish was spoon fed approximately 4 ml of colostrum and then swaddled. The hospital breast pump was set up at bedside, instructions on use given. Marley states she will pump after she eats breakfast. Marley reports that she struggled with milk supply with her previous baby. She was encouraged to pump after feedings to stimulate and build an optimal supply. The plan of care (below) was provided and reviewed. Benefits of skin-to-skin reviewed. Encouragement and emotional support provided. Marley was instructed to request for assistance with feedings.     Care Plan - Latch Difficulty       Place baby skin to skin on mom's chest up to an hour before feeding.     Attempt breastfeeding on infant's early hunger cues (hand in mouth, rooting).    Position baby in cross cradle or football hold, which may help achieve latch.     If latched, watch for rhythmic sucking and occasional swallows.    Limit latch attempts to 5-10 minutes.    If latch difficulty or few/no swallows noted:  o Hand express colostrum and offer via spoon before or after feeding attempt to increase baby's energy level.  o Pump breasts for 15 minutes to stimulate milk production.   o Feed expressed milk to baby using the amounts below as a guideline, give more as baby cues.  If necessary, make up the difference with donor milk or formula as a bridge until milk supply increases:    0-24 hours     2-10 ml each feeding (may use spoon)  24-48 hours   5-15 ml each feeding  48-72 hours   15-30 ml each feeding  72-96 hours   30-60 ml each feeding     Contact " [facility] after discharge as needed. [phone]

## 2022-10-05 NOTE — ANESTHESIA CARE TRANSFER NOTE
Patient: Marley Weldon    Procedure: Procedure(s):  REPEAT  SECTION       Diagnosis: H/O  section complicating pregnancy [O34.219]  Diagnosis Additional Information: No value filed.    Anesthesia Type:   Spinal     Note:    Oropharynx: oropharynx clear of all foreign objects  Level of Consciousness: awake  Oxygen Supplementation: room air  Level of Supplemental Oxygen (L/min / FiO2): 6  Independent Airway: airway patency satisfactory and stable  Dentition: dentition unchanged  Vital Signs Stable: post-procedure vital signs reviewed and stable  Report to RN Given: handoff report given  Patient transferred to: Labor and Delivery    Handoff Report: Identifed the Patient, Identified the Reponsible Provider, Reviewed the pertinent medical history, Discussed the surgical course, Reviewed Intra-OP anesthesia mangement and issues during anesthesia, Set expectations for post-procedure period and Allowed opportunity for questions and acknowledgement of understanding      Vitals:  Vitals Value Taken Time   /74 10/05/22 0440   Temp 36.6  C (97.9  F) 10/05/22 0440   Pulse 75 10/05/22 0440   Resp 14 10/05/22 0440   SpO2 99 % 10/05/22 0441   Vitals shown include unvalidated device data.    Electronically Signed By: TAY Suarez CRNA  2022  4:42 AM

## 2022-10-05 NOTE — PLAN OF CARE
Problem: Adjustment to Role Transition (Postpartum  Delivery)  Goal: Successful Maternal Role Transition  Outcome: Ongoing, Progressing  Intervention: Support Maternal Role Transition  Recent Flowsheet Documentation  Taken 10/5/2022 0542 by Ean Giron RN  Supportive Measures:   active listening utilized   decision-making supported   positive reinforcement provided   problem-solving facilitated   self-care encouraged  Parent/Child Attachment Promotion:   caring behavior modeled   cue recognition promoted   face-to-face positioning promoted   interaction encouraged   parent/caregiver presence encouraged   participation in care promoted   positive reinforcement provided   rooming-in promoted   skin-to-skin contact encouraged   strengths emphasized     Problem: Bleeding (Postpartum  Delivery)  Goal: Hemostasis  Outcome: Ongoing, Progressing     Problem: Pain (Postpartum  Delivery)  Goal: Acceptable Pain Control  Outcome: Ongoing, Progressing   Pt VSS, breastfeeding baby, postpartum bleeding is minimal, Pt states anxiety around baby arrival and nervous about remembering how to transition into  parenting role. Education given around  care.

## 2022-10-05 NOTE — PROGRESS NOTES
MD called and verified that there are no BG orders for GDM Pt. MD stated no order for BG at this time.

## 2022-10-05 NOTE — ANESTHESIA PROCEDURE NOTES
Intrathecal injection Procedure Note    Pre-Procedure   Staff -        Anesthesiologist:  Austin Abrams MD       Performed By: anesthesiologist       Location: OR       Procedure Start/Stop Times: 10/5/2022 3:07 AM and 10/5/2022 3:10 AM       Pre-Anesthestic Checklist: patient identified, IV checked, risks and benefits discussed, informed consent, monitors and equipment checked, pre-op evaluation, at physician/surgeon's request and post-op pain management  Timeout:       Correct Patient: Yes        Correct Procedure: Yes        Correct Site: Yes        Correct Position: Yes   Procedure Documentation  Procedure: intrathecal injection       Patient Position: sitting       Patient Prep/Sterile Barriers: sterile gloves, mask, patient draped       Skin prep: Chloraprep       Insertion Site: L3-4. (midline approach).       Needle Gauge: 24.        Needle Length (Inches): 3.5        Spinal Needle Type: Rizwan tip       Introducer used       # of attempts: 1 and  # of redirects:  1    Assessment/Narrative         Paresthesias: Yes and Resolved.       CSF fluid: clear.    Medication(s) Administered   0.75% Hyperbaric Bupivacaine (Intrathecal) - Intrathecal   1.6 mL - 10/5/2022 3:07:00 AM  Morphine PF 1 mg/mL (Intrathecal) - Intrathecal   0.15 mg - 10/5/2022 3:07:00 AM  Medication Administration Time: 10/5/2022 3:07 AM

## 2022-10-05 NOTE — PROGRESS NOTES
Telephone call from Marley reporting that as she was getting up to use the bathroom she noticed a large gush of fluid that soaked the bed and another large gush when she sat down on the toilet.  Notes that the fluid was clear with some milky white discharge mixed in.  Noted a small amount of pink when she wiped.  Is not noticing any contractions.  Has not felt the baby move since the water broke, but this is not normally an active time  for the baby.  Notes that she did have a high normal level of fluid at 24 cm.  Patient has  scheduled for 7:30 AM today with Dr. Black.  Recommended that patient gather her things and head into [facility] now to confirm rupture of membranes and check on baby.  Patient states that she is quite cautious and feels most comfortable with this plan.  Spoke with Mela charge RN at [facility] and they are accepting o f the patient now.  Plan will be to perform a ROM plus and NST and then discuss case with Dr. Low, in-house OB and if all is well likely tuck patient in for the night and plan for  in the morning as originally planned.  If any concerns about maternal or fetal status, or at the discretion of Dr. Low,  would be performed sooner. All questions answered and patient is in agreement with the plan.    TAY Cavazos, FLORY

## 2022-10-05 NOTE — PROVIDER NOTIFICATION
CNM notified that Pt arived dto unit, SVE 4/80/-3, EFM applied and tracing brigid 1. Pt in pain c ctx and requesting pain meds.

## 2022-10-05 NOTE — PROGRESS NOTES
CAITLIN[facility] Courtesy Round:    Patient Name:  Marley Weldon  :      1975  MRN:      [id number]      Assessment:   1.  Postoperative day #0, day of delivery  2.  Breast-feeding  3.  Contraceptive management  4.  GDM  5.  History of depression and anxiety, stable on medication  6.  Pruritus with carpal tunnel symptoms    Plan:    -Discussed breast care, pain management, breastfeeding initiation, bowel changes, return of fertility, postpartum exercises, postpartum mood changes and adjustments, postpartum blues vs. postpartum depression, sleep changes and required support.     -Continue prenatal vitamin with breast-feeding.    -Follow-up with OB/GYN at 2 weeks postpartum and with CNM at 6 weeks postpartum or sooner as needed.    -Plan for today: encouraged rest, skin-to-skin, breastfeeding on cue, adequate pain control, tub soaks, and limiting visitors.     -Discussed that patient is under physician management with CNM support as needed.     Subjective:  Integrating birth experience. Please with her care. Pain is well controlled with current medications. Baby is breastfeeding on cue. Breastfeeding with the assistance of the nursing staff. Postpartum contraception plans include minipill. Support at home identified adequate. Patient endorses history of depression and anxiety which are well managed with medication therapy.     Objective:  No exam. Courtesy round only.     TT with patient 40 minutes all of which was spent in counseling.    Provider: TAY Alan CNM    Date:  10/5/2022  Time:  2:23 PM
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
## 2022-10-06 LAB — HGB BLD-MCNC: 11 G/DL (ref 11.7–15.7)

## 2022-10-06 PROCEDURE — 36415 COLL VENOUS BLD VENIPUNCTURE: CPT | Performed by: OBSTETRICS & GYNECOLOGY

## 2022-10-06 PROCEDURE — 250N000013 HC RX MED GY IP 250 OP 250 PS 637: Performed by: OBSTETRICS & GYNECOLOGY

## 2022-10-06 PROCEDURE — 120N000001 HC R&B MED SURG/OB

## 2022-10-06 PROCEDURE — 250N000011 HC RX IP 250 OP 636: Performed by: ANESTHESIOLOGY

## 2022-10-06 PROCEDURE — 85018 HEMOGLOBIN: CPT | Performed by: OBSTETRICS & GYNECOLOGY

## 2022-10-06 RX ADMIN — ACETAMINOPHEN 975 MG: 325 TABLET, FILM COATED ORAL at 15:58

## 2022-10-06 RX ADMIN — OXYCODONE HYDROCHLORIDE 5 MG: 5 TABLET ORAL at 06:32

## 2022-10-06 RX ADMIN — NALBUPHINE HYDROCHLORIDE 2.5 MG: 10 INJECTION, SOLUTION INTRAMUSCULAR; INTRAVENOUS; SUBCUTANEOUS at 00:16

## 2022-10-06 RX ADMIN — Medication: at 14:35

## 2022-10-06 RX ADMIN — OXYCODONE HYDROCHLORIDE 5 MG: 5 TABLET ORAL at 10:47

## 2022-10-06 RX ADMIN — SERTRALINE HYDROCHLORIDE 50 MG: 50 TABLET ORAL at 20:11

## 2022-10-06 RX ADMIN — SENNOSIDES AND DOCUSATE SODIUM 2 TABLET: 50; 8.6 TABLET ORAL at 09:26

## 2022-10-06 RX ADMIN — OXYCODONE HYDROCHLORIDE 5 MG: 5 TABLET ORAL at 20:10

## 2022-10-06 RX ADMIN — IBUPROFEN 800 MG: 800 TABLET ORAL at 06:32

## 2022-10-06 RX ADMIN — SENNOSIDES AND DOCUSATE SODIUM 2 TABLET: 50; 8.6 TABLET ORAL at 20:11

## 2022-10-06 RX ADMIN — ACETAMINOPHEN 975 MG: 325 TABLET, FILM COATED ORAL at 02:26

## 2022-10-06 RX ADMIN — IBUPROFEN 800 MG: 800 TABLET ORAL at 20:11

## 2022-10-06 RX ADMIN — OXYCODONE HYDROCHLORIDE 5 MG: 5 TABLET ORAL at 15:58

## 2022-10-06 RX ADMIN — IBUPROFEN 800 MG: 800 TABLET ORAL at 12:47

## 2022-10-06 RX ADMIN — ACETAMINOPHEN 975 MG: 325 TABLET, FILM COATED ORAL at 22:10

## 2022-10-06 RX ADMIN — ACETAMINOPHEN 975 MG: 325 TABLET, FILM COATED ORAL at 09:25

## 2022-10-06 ASSESSMENT — ACTIVITIES OF DAILY LIVING (ADL)
ADLS_ACUITY_SCORE: 20
ADLS_ACUITY_SCORE: 24
ADLS_ACUITY_SCORE: 20

## 2022-10-06 NOTE — PROGRESS NOTES
CAITLINM Courtesy Round:      Patient Name:  Marley Weldon  :      1975  MRN:      [id number]        Subjective:  Marley is doing well.  Pleased with her care.  Is breastfeeding with the help of the nursing staff - has difficulty positioning baby due to bilateral carpel tunnel.  Per lactation notes it does sound like baby had some good feeding episodes yesterday, but also shared that baby was given a supplement during the night (about 0400) as she was so tired.  Postpartum contraception plans include minipill. Support at home identified partner who will be off work X2 weeks.  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  Patient does have a  history of depression/anxiety/mood disorder.  She feels stable on her current meds and will reach out PRN.       Objective:  No exam. Courtesy round only.       Assessment:   - Day 1 postpartum, s/p repeat  .  - CNM courtesy round.  - Breastfeeding  - History of depression/anxiety - stable on meds  - Carpel tunnel pain bilaterally, wearing braces      Plan:    -New mom handout given and reviewed. Discussed breast care, breastfeeding initiation, bowel changes, return of fertility, postpartum mood changes and adjustments, postpartum blues vs.  mood and anxiety disorders, sleep changes and required support.     -Plan for today: encouraged rest alternated with activity, skin-to-skin contact with baby, breastfeeding on cue, adequate pain control.    -Talked with RNs about getting lactation in to help Marley today.  Also set up an outpatient postpartum visit with Soha CROSS CNM, IBCLC for Tuesday 10/11/22 at 1:00 PM at the [facility] (patient given this information).     -Explained the new role of CNM (as emotional support only) while under physician management.       Provider:  TAY Dunaway/FLORY        Date:  10/6/2022  Time:  9:01 AM

## 2022-10-06 NOTE — LACTATION NOTE
"Lactation follow up. Bedside nurse present and reports  had good feeding a short time prior to this writer's arrival and was supplemented with 30 mL of HDM. He is fussy at time of visit, but no feeding cues. Education provided on  stomach size in first few days of life by showing her visuals in education booklet in room. Writer assisted with soothing  and got him to burp several times before he fell asleep in a \"crunched\" position in writer's arms to alleviate stomach discomfort. She is still experiencing significant carpal tunnel symptoms but notes they have been improving somewhat. Discussed supporting  after latch with pillows and rolled blankets to keep deep latch without putting stress on her wrist/hands. Offered support with next feeding if she wishes. Her main concern today was securing a breast pump for home. She has been unable to get one from the place insurance sent her and notes significant stress with this issue. Supplied Medela breast pump for home at this visit.   "
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
## 2022-10-06 NOTE — PLAN OF CARE
Problem: Pain (Postpartum  Delivery)  Goal: Acceptable Pain Control  Outcome: Ongoing, Progressing   Pt reports adequate pain control with scheduled tylenol and ibuprofen, and prn oxycodone. Pt ambulating in room independently.     Problem: Bleeding (Postpartum  Delivery)  Goal: Hemostasis  Outcome: Ongoing, Progressing   VSS. Last Hgb 11. Fundus is firm. Light bleeding.

## 2022-10-06 NOTE — PLAN OF CARE
Problem: Plan of Care - These are the overarching goals to be used throughout the patient stay.    Goal: Plan of Care Review/Shift Note  Description: The Plan of Care Review/Shift note should be completed every shift.  The Outcome Evaluation is a brief statement about your assessment that the patient is improving, declining, or no change.  This information will be displayed automatically on your shift note.  Outcome: Ongoing, Progressing   Vital signs stable.  Patient is ambulating independently in room.  Managing pain with toradol and tylenol. Nubain given for itching.  Goal is for patient to ambulate more today and to rate pain less than or equal to 3.

## 2022-10-06 NOTE — ANESTHESIA POSTPROCEDURE EVALUATION
Patient: Marley Weldon    Procedure: Procedure(s):  REPEAT  SECTION       Anesthesia Type:  Spinal    Note:     Postop Pain Control: Uneventful            Sign Out: Well controlled pain   PONV: No   Neuro/Psych: Uneventful            Sign Out: Acceptable/Baseline neuro status   Airway/Respiratory: Uneventful            Sign Out: Acceptable/Baseline resp. status   CV/Hemodynamics: Uneventful            Sign Out: Acceptable CV status; No obvious hypovolemia; No obvious fluid overload   Other NRE: NONE   DID A NON-ROUTINE EVENT OCCUR? No           Last vitals:  Vitals Value Taken Time   /57 10/05/22 2343   Temp 37.2  C (98.9  F) 10/05/22 2343   Pulse 73 10/05/22 2343   Resp 16 10/05/22 2343   SpO2 98 % 10/05/22 2343       Electronically Signed By: Lucero Jennings MD  2022  3:54 AM

## 2022-10-06 NOTE — PROGRESS NOTES
"Csection - Post operative day 1    ASSESSMENT: PLAN:   POD#1    Repeat csection   Doing well  Continue routine cares   aniticipate discharge in am    SUBJECTIVE:    The patient feels well: Catheter is out, bleeding decreased, tolerating normal diet, and passing flatus.  Pain is well controlled. The patient has no emotional concerns.  The baby is well and being fed    OBJECTIVE:  /52   Pulse 86   Temp 97.7  F (36.5  C)   Resp 16   Ht 1.626 m (5' 4\")   Wt 82.1 kg (181 lb)   LMP 01/04/2022 (Exact Date)   SpO2 99%   Breastfeeding Yes   BMI 31.07 kg/m      Fundus firm  Incision- dressing dry   Ext- nontender      Lab  Hemoglobin   Date Value Ref Range Status   10/06/2022 11.0 (L) 11.7 - 15.7 g/dL Final   ]        Bella Hunter MD  [facility]  [phone]    "

## 2022-10-07 ENCOUNTER — MEDICAL CORRESPONDENCE (OUTPATIENT)
Dept: HEALTH INFORMATION MANAGEMENT | Facility: CLINIC | Age: 47
End: 2022-10-07

## 2022-10-07 PROCEDURE — 250N000013 HC RX MED GY IP 250 OP 250 PS 637: Performed by: OBSTETRICS & GYNECOLOGY

## 2022-10-07 PROCEDURE — 120N000001 HC R&B MED SURG/OB

## 2022-10-07 RX ADMIN — OXYCODONE HYDROCHLORIDE 5 MG: 5 TABLET ORAL at 06:54

## 2022-10-07 RX ADMIN — ACETAMINOPHEN 975 MG: 325 TABLET, FILM COATED ORAL at 17:32

## 2022-10-07 RX ADMIN — OXYCODONE HYDROCHLORIDE 5 MG: 5 TABLET ORAL at 11:27

## 2022-10-07 RX ADMIN — IBUPROFEN 800 MG: 800 TABLET ORAL at 14:36

## 2022-10-07 RX ADMIN — OXYCODONE HYDROCHLORIDE 5 MG: 5 TABLET ORAL at 02:17

## 2022-10-07 RX ADMIN — OXYCODONE HYDROCHLORIDE 5 MG: 5 TABLET ORAL at 16:13

## 2022-10-07 RX ADMIN — ACETAMINOPHEN 975 MG: 325 TABLET, FILM COATED ORAL at 04:12

## 2022-10-07 RX ADMIN — SERTRALINE HYDROCHLORIDE 50 MG: 50 TABLET ORAL at 20:33

## 2022-10-07 RX ADMIN — ACETAMINOPHEN 975 MG: 325 TABLET, FILM COATED ORAL at 11:27

## 2022-10-07 RX ADMIN — IBUPROFEN 800 MG: 800 TABLET ORAL at 08:19

## 2022-10-07 RX ADMIN — IBUPROFEN 800 MG: 800 TABLET ORAL at 02:17

## 2022-10-07 RX ADMIN — SENNOSIDES AND DOCUSATE SODIUM 2 TABLET: 50; 8.6 TABLET ORAL at 20:37

## 2022-10-07 RX ADMIN — IBUPROFEN 800 MG: 800 TABLET ORAL at 20:33

## 2022-10-07 RX ADMIN — SENNOSIDES AND DOCUSATE SODIUM 2 TABLET: 50; 8.6 TABLET ORAL at 08:19

## 2022-10-07 RX ADMIN — OXYCODONE HYDROCHLORIDE 5 MG: 5 TABLET ORAL at 20:48

## 2022-10-07 ASSESSMENT — ACTIVITIES OF DAILY LIVING (ADL)
ADLS_ACUITY_SCORE: 20

## 2022-10-07 NOTE — PROGRESS NOTES
"Csection - Post operative day 2    ASSESSMENT: PLAN:   POD#2    Repeat csection  Having problems with breast milk production  Continue routine cares   aniticipate discharge in am    SUBJECTIVE:    The patient feels well: Catheter is out, bleeding decreased, tolerating normal diet, and passing flatus.  Pain is well controlled. The patient has no emotional concerns.  The baby is well and being fed    OBJECTIVE:  /63   Pulse 89   Temp 97.9  F (36.6  C) (Oral)   Resp 18   Ht 1.626 m (5' 4\")   Wt 82.1 kg (181 lb)   LMP 01/04/2022 (Exact Date)   SpO2 98%   Breastfeeding Yes   BMI 31.07 kg/m      Incision- dressing dry   Ext- nontender      Lab  Hemoglobin   Date Value Ref Range Status   10/06/2022 11.0 (L) 11.7 - 15.7 g/dL Final   ]        Bella Hunter MD  [facility]  [phone]      "

## 2022-10-07 NOTE — PLAN OF CARE
"  Problem: Plan of Care - These are the overarching goals to be used throughout the patient stay.    Goal: Plan of Care Review/Shift Note  Description: The Plan of Care Review/Shift note should be completed every shift.  The Outcome Evaluation is a brief statement about your assessment that the patient is improving, declining, or no change.  This information will be displayed automatically on your shift note.  Outcome: Ongoing, Progressing  Flowsheets (Taken 10/6/2022 2136)  Plan of Care Reviewed With: patient  Outcome Evaluation: she is extremely happy at this time due to her hand and wrists having reduced pain and being able to latch infant. she was very nervous adn angry at herself at approx 1945 and has calmed down since  Overall Patient Progress: improving   Pain is present at incision and with her chronic carpel tunnel. Patient reports that \"this evening is the best my wrists and hands have felt in such a long time\" she reports pain is reducing with use of prn and scheduled pain medications   "

## 2022-10-07 NOTE — PROGRESS NOTES
CNM Courtesy Round:    Patient Name:  Marley Weldon  :      1975  MRN:      [id number]    Assessment:   Day 2 postpartum, s/p , repeat planned  ADHD  Anxiety on Zoloft  Carpal tunnel  CNM courtesy round.    Plan:    -Discussed breast care, pain management, breastfeeding initiation, bowel changes, return of fertility,  postpartum mood changes and adjustments, postpartum blues vs. postpartum depression, sleep changes and required support.     -Plan for today: encouraged rest, skin-to-skin, breastfeeding on cue, adequate pain control, and limiting visitors.     -Discussed that patient is under physician management with CNM support as needed.        Subjective:  Integrating birth experience. Pleased with her care. The patient is voiding and ambulating without difficulty. Tolerating normal diet and passing flatus. no BM yet, taking stool softeners. Bleeding is normal for postpartum course. Pain is now well controlled with current medications. Baby is breast feeding on cue.  Breastfeeding with the assistance of the nursing staff and has had visit with [facility]. Postpartum contraception plans include P OP. Support at home identified father the baby Clark. Patient will have 8 weeks off from work. Patient has history of anxiety on Zoloft. Has breast pump now (given in hospital).     Objective:  No exam. Courtesy round only.          Provider: Xochitl CROSS CNM    Date:  10/7/2022  Time:  1:37 PM

## 2022-10-07 NOTE — PLAN OF CARE
Marley's vitals and maternal assessments are WNL. Pain adequately controlled with scheduled medication and abdominal binder. Patient was able to rest while infant was with nurse in the nursery. Marley plans to begin pumping again in the morning, this RN educated on the importance of pumping after every supplemental feed. Marley noted that her carpal tunnel is improving now that she is not pregnant.    Problem: Plan of Care - These are the overarching goals to be used throughout the patient stay.    Goal: Optimal Comfort and Wellbeing  Outcome: Ongoing, Progressing  Intervention: Monitor Pain and Promote Comfort  Recent Flowsheet Documentation  Taken 10/7/2022 0220 by Nicky Escalante, RN  Pain Management Interventions: medication (see MAR)  Intervention: Provide Person-Centered Care  Recent Flowsheet Documentation  Taken 10/7/2022 0220 by Nicky Escalante RN  Trust Relationship/Rapport:   care explained   choices provided   emotional support provided   empathic listening provided   questions answered   questions encouraged   reassurance provided   thoughts/feelings acknowledged     Problem: Adjustment to Role Transition (Postpartum  Delivery)  Goal: Successful Maternal Role Transition  Outcome: Ongoing, Progressing  Intervention: Support Maternal Role Transition  Recent Flowsheet Documentation  Taken 10/7/2022 0220 by Nicky Escalante, RN  Supportive Measures:   active listening utilized   self-care encouraged   positive reinforcement provided   verbalization of feelings encouraged   problem-solving facilitated  Parent/Child Attachment Promotion:   caring behavior modeled   cue recognition promoted   strengths emphasized   skin-to-skin contact encouraged   positive reinforcement provided   participation in care promoted     Problem: Pain (Postpartum  Delivery)  Goal: Acceptable Pain Control  Outcome: Ongoing, Progressing  Intervention: Prevent or Manage Pain  Recent Flowsheet  Documentation  Taken 10/7/2022 0220 by Nicky Escalante, RN  Pain Management Interventions: medication (see MAR)

## 2022-10-07 NOTE — PROGRESS NOTES
"Patient anxious and talking to herself, states she is feeling \"so guilty and bad for him (her infant) having withdrawal symptoms.\" patient has not slept \"in a long time\" writer offered to bring infant to the nursery since pt is anxious and unable to console her infant. She agreed and is trying to rest. Infant brought to nursery at 2215  "

## 2022-10-07 NOTE — PLAN OF CARE
Problem: Pain (Postpartum  Delivery)  Goal: Acceptable Pain Control  Outcome: Ongoing, Progressing  Intervention: Prevent or Manage Pain  Recent Flowsheet Documentation  Taken 10/7/2022 1436 by Carolina Mcdowell, RN  Pain Management Interventions: medication (see MAR)  Taken 10/7/2022 0819 by Carolina Mcdowell, RN  Pain Management Interventions: medication (see MAR)   Patient taking tylenol, ibuprofen and oxycodone for pain control

## 2022-10-07 NOTE — LACTATION NOTE
This note was copied from a baby's chart.  This writer met with Marley per her request to observe and assist with breastfeeding.  She complains of very sore nipples with latching infant onto the breast.  She was re-educated on hand expression, proper positioning of infant at the breast, latch techniques and to stimulate infant to keep him actively sucking/ swallowing at the breast.  Marley has been supplementing infant with donor milk after breastfeeding and pumping her breasts for every bottle infant receives.  She is obtaining a couple milliliters with each pumping session and feels her breasts are just beginning to fill.  After education and demonstration of proper latch x 2, she was able to independently latch infant onto each breast with mild nipple discomfort that lasted < 30 seconds.  Infant was able to actively suck at the breast for > 5-7 minutes with several swallows heard.  She was encouraged to supplement infant after breastfeeding, as infant cues and pump her breasts for every supplement infant receives.  Marley has a breast pump for home use.  She verbalizes understanding of all education given.  She denies any further questions.  She was encouraged to follow up at outpatient lactation clinic after discharge, prn.

## 2022-10-08 VITALS
OXYGEN SATURATION: 98 % | SYSTOLIC BLOOD PRESSURE: 119 MMHG | TEMPERATURE: 98 F | HEIGHT: 64 IN | DIASTOLIC BLOOD PRESSURE: 64 MMHG | BODY MASS INDEX: 30.9 KG/M2 | RESPIRATION RATE: 16 BRPM | HEART RATE: 78 BPM | WEIGHT: 181 LBS

## 2022-10-08 PROCEDURE — 250N000013 HC RX MED GY IP 250 OP 250 PS 637: Performed by: OBSTETRICS & GYNECOLOGY

## 2022-10-08 RX ORDER — IBUPROFEN 800 MG/1
800 TABLET, FILM COATED ORAL EVERY 6 HOURS PRN
Qty: 30 TABLET | Refills: 0 | Status: SHIPPED | OUTPATIENT
Start: 2022-10-08 | End: 2022-10-18

## 2022-10-08 RX ORDER — OXYCODONE HYDROCHLORIDE 5 MG/1
5 TABLET ORAL EVERY 4 HOURS PRN
Qty: 12 TABLET | Refills: 0 | Status: SHIPPED | OUTPATIENT
Start: 2022-10-08 | End: 2022-10-18

## 2022-10-08 RX ORDER — DOCUSATE SODIUM 100 MG/1
100 CAPSULE, LIQUID FILLED ORAL 2 TIMES DAILY
Qty: 20 CAPSULE | Refills: 0 | Status: SHIPPED | OUTPATIENT
Start: 2022-10-08 | End: 2022-10-11

## 2022-10-08 RX ADMIN — OXYCODONE HYDROCHLORIDE 5 MG: 5 TABLET ORAL at 16:23

## 2022-10-08 RX ADMIN — OXYCODONE HYDROCHLORIDE 5 MG: 5 TABLET ORAL at 06:22

## 2022-10-08 RX ADMIN — IBUPROFEN 800 MG: 800 TABLET ORAL at 02:52

## 2022-10-08 RX ADMIN — Medication: at 06:21

## 2022-10-08 RX ADMIN — IBUPROFEN 800 MG: 800 TABLET ORAL at 09:20

## 2022-10-08 RX ADMIN — OXYCODONE HYDROCHLORIDE 5 MG: 5 TABLET ORAL at 01:01

## 2022-10-08 RX ADMIN — IBUPROFEN 800 MG: 800 TABLET ORAL at 15:28

## 2022-10-08 RX ADMIN — SENNOSIDES AND DOCUSATE SODIUM 1 TABLET: 50; 8.6 TABLET ORAL at 09:20

## 2022-10-08 RX ADMIN — ACETAMINOPHEN 650 MG: 325 TABLET, FILM COATED ORAL at 11:09

## 2022-10-08 RX ADMIN — ACETAMINOPHEN 650 MG: 325 TABLET, FILM COATED ORAL at 15:28

## 2022-10-08 RX ADMIN — ACETAMINOPHEN 650 MG: 325 TABLET, FILM COATED ORAL at 06:21

## 2022-10-08 RX ADMIN — OXYCODONE HYDROCHLORIDE 5 MG: 5 TABLET ORAL at 11:09

## 2022-10-08 RX ADMIN — ACETAMINOPHEN 975 MG: 325 TABLET, FILM COATED ORAL at 00:12

## 2022-10-08 ASSESSMENT — ACTIVITIES OF DAILY LIVING (ADL)
ADLS_ACUITY_SCORE: 20

## 2022-10-08 NOTE — DISCHARGE SUMMARY
HOSPITAL DISCHARGE SUMMARY -  Birth    Patient Name: Marley Weldon   YOB: 1975  Age: 47 year old  Medical Record Number: [id number]  Primary Physician: Thony Sykes    Admission Date:  10/5/2022  Delivery Date:   @BABYSUPPRESS(DBLINK,ept,110,,1,,)@  Gestational Age at Delivery:  39w1d   Discharge Date:  10/8/2022    REASON FOR ADMISSION: Labor and Delivery    DIAGNOSIS:    1.  Birth secondary to prior C/S  2. APGARS at 1 min 8, at 5 min 9  3. Baby's Weight 9 lbs 3.4 oz    Conditions complicating antepartum/postpartum:  Antepartum: Prior C/S x 1, GDM A1, ADHD, Anxiety - Zoloft 50 mg, Overweight, seizure in   Intrapartum: None  Postpartum: Asymptomatic acute blood loss anemia    PROCEDURES:  Lower transverse     SIGNIFICANT DIAGNOSTIC PROCEDURES:   None    CONSULTS: None    HISTORY OF PRESENT ILLNESS AND HOSPITAL COURSE: This is a 47 year old   female who presented in labor prior to her scheduled  section.  She underwent  section without complication secondary to history of prior .  She had asymptomatic acute blood loss anemia.  On the day of discharge patient was tolerating diet, pain was controlled with oral medications, she was voiding and passing gas.    LABS:  Hemoglobin   Date Value Ref Range Status   10/06/2022 11.0 (L) 11.7 - 15.7 g/dL Final   ]  PENDING LABS:  None    DISPOSITION:  Home    DISCHARGE CONDITION: Good/Stable    DISCHARGE MEDICATIONS:      Review of your medicines      START taking      Dose / Directions   docusate sodium 100 MG capsule  Commonly known as: COLACE  Used for:  delivery delivered      Dose: 100 mg  Take 1 capsule (100 mg) by mouth 2 times daily  Quantity: 20 capsule  Refills: 0     ibuprofen 800 MG tablet  Commonly known as: ADVIL/MOTRIN  Used for:  delivery delivered      Dose: 800 mg  Take 1 tablet (800 mg) by mouth every 6 hours as needed for other (cramping)  Quantity: 30  tablet  Refills: 0     oxyCODONE 5 MG tablet  Commonly known as: ROXICODONE  Used for:  delivery delivered      Dose: 5 mg  Take 1 tablet (5 mg) by mouth every 4 hours as needed  Quantity: 12 tablet  Refills: 0        CONTINUE these medicines which have NOT CHANGED      Dose / Directions   blood glucose lancets standard  Commonly known as: NO BRAND SPECIFIED  Used for: Gestational diabetes mellitus (GDM) in second trimester, gestational diabetes method of control unspecified      Use to test blood sugar 4 times daily or as directed.  Quantity: 120 lancet  Refills: 6     blood glucose monitoring lancets      USE TO TEST BLOOD GLUCOSE FOUR TIMES DAILY AS DIRECTED.  Refills: 0     blood glucose monitoring meter device kit  Commonly known as: NO BRAND SPECIFIED  Used for: Gestational diabetes mellitus (GDM) in second trimester, gestational diabetes method of control unspecified      Use to test blood sugar 4 times daily or as directed.  Quantity: 1 kit  Refills: 1     blood glucose test strip  Commonly known as: NO BRAND SPECIFIED  Used for: Gestational diabetes mellitus (GDM) in second trimester, gestational diabetes method of control unspecified      Use to test blood sugar 4 times daily or as directed.  Quantity: 120 strip  Refills: 6     cyanocobalamin 1000 MCG tablet  Commonly known as: VITAMIN B-12  Used for: Anemia affecting pregnancy in second trimester      Dose: 1,000 mcg  Take 1 tablet (1,000 mcg) by mouth daily  Quantity: 30 tablet  Refills: 4     ferrous sulfate 325 (65 Fe) MG tablet  Commonly known as: FEROSUL  Used for: Anemia affecting pregnancy in second trimester      Dose: 325 mg  Take 1 tablet (325 mg) by mouth daily (with breakfast)  Quantity: 30 tablet  Refills: 4     Ketostix test strip  Generic drug: acetone urine      TEST EVERY DAY  Refills: 0     magnesium citrate 1.745 GM/30ML solution (NOT currently available)      Dose: 296 mL  Take 296 mLs by mouth once  Refills: 0     Prenatal 19  29-1 MG Chew  Used for: Supervision of high-risk pregnancy of elderly multigravida      Dose: 1 tablet  Take 1 tablet by mouth daily  Quantity: 60 tablet  Refills: 3     sertraline 50 MG tablet  Commonly known as: ZOLOFT      Dose: 50 mg  [SERTRALINE (ZOLOFT) 50 MG TABLET] Take 1 tablet (50 mg total) by mouth daily.  Quantity: 50 tablet  Refills: 6     Urine Glucose-Ketones Test Strp  Used for: Gestational diabetes mellitus (GDM) in second trimester, gestational diabetes method of control unspecified      Dose: 1 each  1 each by Other route daily  Quantity: 50 strip  Refills: 3     vitamin C 250 MG tablet  Commonly known as: ASCORBIC ACID  Used for: Anemia affecting pregnancy in second trimester      Dose: 250 mg  Take 1 tablet (250 mg) by mouth daily  Quantity: 30 tablet  Refills: 4     vitamin D3 50 mcg (2000 units) tablet  Commonly known as: CHOLECALCIFEROL  Used for: High-risk pregnancy, elderly multigravida, unspecified trimester      Dose: 2 tablet  Take 2 tablets (100 mcg) by mouth daily  Quantity: 60 tablet  Refills: 1        STOP taking    hydrOXYzine 50 MG capsule  Commonly known as: VISTARIL              Where to get your medicines      Some of these will need a paper prescription and others can be bought over the counter. Ask your nurse if you have questions.    Bring a paper prescription for each of these medications    docusate sodium 100 MG capsule    ibuprofen 800 MG tablet    oxyCODONE 5 MG tablet       DISCHARGE PLAN:   - Follow up with  Dr. Georgia Low, in 1-2 weeks  - Take medication as prescribed  - Physical activity: As tolerated, no heavy lifting. Pelvic rest.  - Diet:  Regular  - Medication:  Please see MAR  - Warning signs discussed with patient about when to call the clinic/hospital  - All questions and concerns were answered for the patient prior to discharge.       Georgia Low MD, FACOG  P) [phone]    I saw the patient on the date of discharge  Total time spent for  discharge on date of discharge: 20 minutes    Physician(s) in addition to primary physician who should receive a copy:  CC1: Dr. Black

## 2022-10-08 NOTE — PLAN OF CARE
Patient is managing pain with Oxycodone, Ibuprofen and Tylenol.   Postpartum assessment WNL.   Patient is hopeful for discharge to home today.  Tdap up to date.   Still needs to complete mood assessment.  Birth certificate completed.     Problem: Bleeding (Postpartum  Delivery)  Goal: Hemostasis  Outcome: Ongoing, Progressing     Problem: Infection (Postpartum  Delivery)  Goal: Absence of Infection Signs and Symptoms  Outcome: Ongoing, Progressing  Intervention: Prevent or Manage Infection  Recent Flowsheet Documentation  Taken 10/8/2022 0012 by Isatu Barron RN  Infection Management: aseptic technique maintained     Problem: Pain (Postpartum  Delivery)  Goal: Acceptable Pain Control  Outcome: Ongoing, Progressing  Intervention: Prevent or Manage Pain  Recent Flowsheet Documentation  Taken 10/8/2022 0252 by Isatu Barron RN  Pain Management Interventions:   medication (see MAR)   repositioned   rest  Taken 10/8/2022 010 by Isatu Barron RN  Pain Management Interventions:   medication (see MAR)   repositioned   rest  Taken 10/8/2022 001 by Isatu Barron, RN  Pain Management Interventions:   medication (see MAR)   repositioned   rest     Problem: Adjustment to Role Transition (Postpartum  Delivery)  Goal: Successful Maternal Role Transition  Outcome: Met  Intervention: Support Maternal Role Transition  Recent Flowsheet Documentation  Taken 10/8/2022 001 by Isatu Barron, RN  Supportive Measures:   active listening utilized   counseling provided   decision-making supported   positive reinforcement provided  Parent/Child Attachment Promotion:   caring behavior modeled   cue recognition promoted   positive reinforcement provided     Problem: Postoperative Nausea and Vomiting (Postpartum  Delivery)  Goal: Nausea and Vomiting Relief  Outcome: Met     Problem: Postoperative Urinary Retention (Postpartum  Delivery)  Goal: Effective Urinary Elimination  Outcome: Met  Intervention:  Monitor and Manage Urinary Retention  Recent Flowsheet Documentation  Taken 10/8/2022 0012 by Isatu Barron RN  Urinary Elimination Promotion: frequent voiding encouraged     Problem: Plan of Care - These are the overarching goals to be used throughout the patient stay.    Goal: Absence of Hospital-Acquired Illness or Injury  Intervention: Prevent Skin Injury  Recent Flowsheet Documentation  Taken 10/8/2022 0012 by Isatu Barron RN  Body Position: position changed independently  Intervention: Prevent and Manage VTE (Venous Thromboembolism) Risk  Recent Flowsheet Documentation  Taken 10/8/2022 0012 by Isatu Barron RN  VTE Prevention/Management: SCDs (sequential compression devices) off  Activity Management:   ambulated in room   ambulated to bathroom   up ad santos  Intervention: Prevent Infection  Recent Flowsheet Documentation  Taken 10/8/2022 0012 by Isatu Barron RN  Infection Prevention:   environmental surveillance performed   hand hygiene promoted  Goal: Optimal Comfort and Wellbeing  Intervention: Monitor Pain and Promote Comfort  Recent Flowsheet Documentation  Taken 10/8/2022 0252 by Isatu Barron RN  Pain Management Interventions:   medication (see MAR)   repositioned   rest  Taken 10/8/2022 0101 by Isatu Barron RN  Pain Management Interventions:   medication (see MAR)   repositioned   rest  Taken 10/8/2022 0012 by Isatu Barron RN  Pain Management Interventions:   medication (see MAR)   repositioned   rest  Intervention: Provide Person-Centered Care  Recent Flowsheet Documentation  Taken 10/8/2022 0012 by Isatu Barron RN  Trust Relationship/Rapport:   care explained   empathic listening provided   questions answered   questions encouraged   reassurance provided   thoughts/feelings acknowledged   choices provided   emotional support provided

## 2022-10-08 NOTE — PROGRESS NOTES
CNM Courtesy Round:    Patient Name:  Marley Weldon  :      1975  MRN:      [id number]      Assessment:   Day 3 postpartum, s/p repeat  .  CNM courtesy round.  Breastfeeding   Contraceptive counseling POPs  Anxiety- well managed on Zoloft    Plan:    -Continue with Zoloft. Disc possible need to adjust med postpartum. Warning signs of worsening anxiety and depression postpartum reviewed. Enc to reach out to CNM with any of these. Reviewed sleep changes and required support.   -plans discharge today.   -Follow-up with MD at 2 weeks and CNM at 6 weeks.   -Retrun of fertility and starting POPs at 6 wks reviewed.   -Outpatient lactation resources discussed. Has lactation appt scheduled.   -Covid booster and flu shot recommended, pt declines.     Subjective:  Marley is excited to go home. Pleased with her care. Joyfully reviews birth experience, glad she got to experience labor and her water breaking. Thankful to be feeling well with controlled pain. Had a BM yesterday. Has had good lactation support and feels like she has a good plan.  Breastfeeding with the assistance of the nursing staff. Postpartum contraception plans include POPs. Support at home identified Clark who is off 2 weeks. Patient will have 8 weeks off from work. On Zoloft for Anxiety, feels it is currently well managed.     Objective:  No exam. Courtesy round only.     Provider:  TAY Paul, FLORY, IBCLC  [facility]  Midwifery        Date:  10/8/2022  Time:  8:30 AM

## 2022-10-08 NOTE — PLAN OF CARE
Marley's VSS.  She's independent with self and baby cares.  Marley's putting baby to breast as he cues, every 1-2 hours.  He has a good latch, suck and swallow.  Marley reports that her nipples feel sore, but they are intact.  She's using Lanolin intermittently.  She's additionally supplementing him with donor milk.    Problem: Pain (Postpartum  Delivery)  Goal: Acceptable Pain Control  Outcome: Ongoing, Progressing  Intervention: Prevent or Manage Pain  Recent Flowsheet Documentation  Taken 10/7/2022 173 by Crissy Bridges RN  Pain Management Interventions: medication (see MAR)   She's taking tylenol, Ibuprofen and Oxy for abdominal/incisional pain.

## 2022-10-08 NOTE — PLAN OF CARE
Patient doing well post partum - post op.  States appropriate relief from PO meds and demonstrates independence in cares of self and infant.  Desires to be discharged this janell.  Will support patient desires and continue with cares and continued assessment.

## 2022-10-08 NOTE — PROGRESS NOTES
"Chart reviewed.   Sw consulted on Baby - \"Baby is on TASHIA scoring. Mother is on zoloft. Sent drug detection panel /marijuana metabolite on umbilical cord. Please follow up on testing\"     Baby Tox pending at this time.   [facility] will follow.     Isatu Banda, LUCHO  10/8/2022    "

## 2022-10-09 RX ORDER — DOCUSATE SODIUM 100 MG/1
100 CAPSULE, LIQUID FILLED ORAL 2 TIMES DAILY
Qty: 20 CAPSULE | Refills: 1 | Status: SHIPPED | OUTPATIENT
Start: 2022-10-09 | End: 2022-10-11

## 2022-10-09 RX ORDER — OXYCODONE HYDROCHLORIDE 5 MG/1
5 TABLET ORAL EVERY 6 HOURS PRN
Qty: 12 TABLET | Refills: 0 | Status: SHIPPED | OUTPATIENT
Start: 2022-10-09 | End: 2022-10-11

## 2022-10-09 RX ORDER — IBUPROFEN 600 MG/1
600 TABLET, FILM COATED ORAL EVERY 6 HOURS PRN
Qty: 30 TABLET | Refills: 1 | Status: SHIPPED | OUTPATIENT
Start: 2022-10-09 | End: 2022-10-11

## 2022-10-09 NOTE — PLAN OF CARE
Marley's VSS.  She's ambulating without difficulty, independent with self and baby cares.  Breastfeeding continues to go well; her milk is coming in and she's pumping getting 15-20 ml. She's using Lanolin on her nipples.  Her pain is well controlled with Tylenol, Ibuprofen and Oxy.  She was given a RX for Oxy to take at home per orders.  Marley and her  were given written and verbal discharge instructions and danger signs for her.  They had no questions, she verbalized understanding, and signed discharge paperwork.  Baby was buckled into an appropriate car seat; checked by this RN.  The family was escorted to their vehicle by RN.

## 2022-10-09 NOTE — PROGRESS NOTES
In House OB    Patient presented because her medication Rx did not have signatures  I reopened the chart and gave her Oxycodone, Ibuprofen, and Colace    Misty Cohen M.D.

## 2022-10-11 ENCOUNTER — MYC MEDICAL ADVICE (OUTPATIENT)
Dept: OBGYN | Facility: CLINIC | Age: 47
End: 2022-10-11

## 2022-10-11 ENCOUNTER — ALLIED HEALTH/NURSE VISIT (OUTPATIENT)
Dept: MIDWIFE SERVICES | Facility: CLINIC | Age: 47
End: 2022-10-11
Payer: COMMERCIAL

## 2022-10-11 VITALS — SYSTOLIC BLOOD PRESSURE: 112 MMHG | DIASTOLIC BLOOD PRESSURE: 70 MMHG

## 2022-10-11 DIAGNOSIS — O92.79 OTHER DISORDERS OF LACTATION: Primary | ICD-10-CM

## 2022-10-11 PROCEDURE — 99417 PROLNG OP E/M EACH 15 MIN: CPT | Performed by: ADVANCED PRACTICE MIDWIFE

## 2022-10-11 PROCEDURE — 99215 OFFICE O/P EST HI 40 MIN: CPT | Performed by: ADVANCED PRACTICE MIDWIFE

## 2022-10-11 RX ORDER — ACETAMINOPHEN 500 MG
500-1000 TABLET ORAL EVERY 6 HOURS PRN
COMMUNITY
End: 2022-10-18

## 2022-10-11 NOTE — PATIENT INSTRUCTIONS
"  1.  Use good positioning for deep latch, with baby held close to body and baby's head/shoulders/hips in good alignment.  When in a seated position, use a pillow to help bring baby close to breasts, and stepstool to elevate your knees above hips.   2.   Present breast in the \"sandwich\" hold, compressing breast vertically and in line with baby's mouth, for baby to get a larger mouthful of breast and a deeper latch.  If there is pinching or pain, try using a finger to give a little gentle pressure on his chin to help his open more widely and take in more of your breast.  If it is still painful, use a finger to break the suction, remove baby from the breast and try again until there is no pain with nursing.  There is sometimes a little pain when the baby first begins sucking, but after the first few seconds there should be no pain--only a tugging feeling.  3.  Babies latch best to the breast by bringing their chin in first, so point your nipple towards baby's nose, tuck the chin in close, and then wait for his mouth to open.  When his mouth opens, bring his head in deeply.  Baby's chin should be snugged deeply in your breast, their upper cheeks should be touching the breast, and their nose just out of the breast.  4.  To continue to nurse baby on cue, 8-12 times each day.  Feed on one side until baby finishes swallowing.  Once swallowing slows, use breast compression to encourage more swallowing, but once there is no more active swallowing, and baby is either sleeping, coming off the breast, or just \"nibbling,\" it is OK to use a finger to take baby off the breast and move to the other breast.  Do the same on the other side.  Offer both breasts at each feeding--consider starting with the left side sometimes.  It is more important to watch the baby than the clock!   5. You do not need to worry about whether or not your breasts are full before nursing;  Milk production is constant.  You do not need to wait until they are " "\"full\" again before nursing or pumping, because there is always milk in the breast.  You can put your baby to the breast as often as you want to without worrying that the milk will be \"gone.\"    6.  When your breasts are really full and uncomfortable, you can do some very gentle massage (as light as petting a cat--don't press hard).   Just very lightly stroke from your breast out towards your armpit.  You can also do some hand expression.  If they are still full and uncomfortable, you can use some ice on your breasts after feeding.   7.  See pediatric provider as planned, and lactation as needed.  [employer] can be used for brief questions, but it's important to know that messages are not seen Friday through Sunday. If urgent help is needed, Monday through Friday you can call [phone] and one of our lactation consultants will get the message and respond; if you need a rapid response over a weekend or holiday, it is best to call your on-call maternity or pediatric provider.  Please feel free to schedule a return visit if the concern is more detailed;  telephone visits are also an option if you don't feel you need to be seen in person.    ______________      For excellent video on positioning for good latch:      [email or web address]  (click on \"attachment\")     ______________________  To know if your baby is getting enough milk, the main things to rely on are:  The baby's weight (should regain birthweight at around 2 weeks, and then gain around 1 oz/day after that)  Poop.  Baby should be having yellow, runny poop by day 5, and multiple times each day  Pee:  about as many wet diapers as they are days old for the first 5-6 days, and then continue with at least 5-6 wet diapers/day.    Whether your baby takes milk from a bottle after nursing, how firm or soft your breasts feel, how much milk comes when you pump, how often your baby feeds or how fussy your baby is are NOT good ways to assess whether or not " your baby is getting enough milk from your breasts.  For a more complete explanation, see these two excellent articles by CRISTOBAL Zambrano:      Good breastfeeding resources:    Websites:  www.[employer]deri  www.naaptol.[employer]der[employer]/blog/  www.llli.org/breastfeeding-info/    Book:   The Womanly Art of Breastfeeding by [employer]

## 2022-10-12 NOTE — TELEPHONE ENCOUNTER
Per patient message dated 10/12/22, this message can be disregarded.  She found the answer she was looking for.  Nothing further needed at this time.

## 2022-10-18 ENCOUNTER — OFFICE VISIT (OUTPATIENT)
Dept: OBGYN | Facility: CLINIC | Age: 47
End: 2022-10-18
Payer: COMMERCIAL

## 2022-10-18 VITALS
WEIGHT: 159 LBS | BODY MASS INDEX: 27.14 KG/M2 | HEART RATE: 80 BPM | HEIGHT: 64 IN | DIASTOLIC BLOOD PRESSURE: 70 MMHG | SYSTOLIC BLOOD PRESSURE: 118 MMHG | OXYGEN SATURATION: 98 %

## 2022-10-18 DIAGNOSIS — Z98.890 POST-OPERATIVE STATE: Primary | ICD-10-CM

## 2022-10-18 PROCEDURE — 99024 POSTOP FOLLOW-UP VISIT: CPT | Performed by: OBSTETRICS & GYNECOLOGY

## 2022-11-07 NOTE — PROGRESS NOTES
"Routine Postpartum Visit      Subjective:      Marley is a 47 year old year old female who presents to clinic today for a postpartum visit.  She is currently 5 weeks postpartum of a planned C-sec birth (arrived 2 am in labor).  The birth happened on 10/5/22.  The baby boy was 39 weeks gestation at the time of the birth.  She attends the visit today with \"Parish\".   pt had GDM,  a 3 month PP A1c, will also get hemoglobin and Vit D level    Marley's postpartum course has been smooth.  Baby Parish's  course has been smooth, growing well.     Feelings about how her birth went:  Very good.  Bleeding Status:  Bled for 4 weeks, then [place].  Currently has had a little BRB x 1 this week--will watch to see if it's her menses.  Sleeping Status:  Pretty good  Eating:  well  Bowel/Bladder Function:  Bowel function is normal. Bladder function is normal.  No SI.  Exercise:  Walking, 1 hr light housework.  [place]:  Pt has not resumed intercourse.  Wants POP.  Pain:  none  Contraceptive Plans:  Planned contraception method is POP. Rx given. Proper use and warnings discussed  Baby's Feeding Method:  Baby is feeding by mostly breast milk, 20% formula.  Mental Health/Mood Status:  Feeling pretty good.  [place]  Depression Scale score of 6/30 today.  Never to #10, pt states that she worries about housework and organizing.  Work Plans:  Will be returning to work.  Dec 10th just weekends.      Review of Systems  -A 12 point comprehensive review of systems was negative except as noted above.  -Prenatal history and intrapartum course were also reviewed today.    History of abnormal pap:  5/10/11 NIL  14 ASCUS, neg HPV  17 ASC-H, +HR HPV (unknown strain)  17 colpo bx JOI I  11/3/20 NIL pap, neg HPV. Plan: cotest in 3 years per provider--Pap with cotesting due 2023      Objective:        There were no vitals filed for this visit.    Physical Exam:    General Appearance: Pleasant, articulate, " well-groomed, well-nourished female.  Not in any apparent distress.    Neck: Supple, symmetrical, no adenopathy.  Thyroid:  Not enlarged, symmetric, no tenderness/mass/nodules  Back: no CVA tenderness  Breasts: Symmetrical, non-tender.  No masses, adenopathy, or nipple discharge.  Abdomen: Soft, non-tender, no masses, diastasis normal, no separation  Pelvic:  -External genitalia:  Normal hair distribution, no lesion.   -Urethral opening: Without lesions, or tenderness.   -Bladder: Without masses, or tenderness.  -Vagina: Pink, rugated, normal-appearing discharge.  -Cervix: cl, pink, smooth, no lesions.  Pap smear not due.  -Bimanual: Finds uterus small, mobile, nontender, no masses.  Negative CMT.  Adnexa, without masses or tenderness.    Extremities: Extremities no edema, no varicosities.        Assessment/Plan:     1)  Normal postpartum visit  2)  Contraceptive plans include: POP RX given with proper use and warnings discussed including that she will need to take the medication every 24 hours and use backup for 7 days if she is more than 1 hour off from the day before.  Recommended changing to an alternative contraceptive method at 6 weeks postpartum or when patient is pumping or breast-feeding less.  Patient verbalizes understanding  3)  General postpartum education completed including:  Self-care of physical and emotional needs in this new postpartum period, return of fertility, resumption of intercourse, discussion of general health maintenance, sleep needs, required support of family/friends/community, and watching for signs/symptoms of  mood and anxiety disorders.  4)  Pap with cotesting due 2023.  5) return to clinic for a lab only appointment for an A1c, hemoglobin, vitamin D level in approximately 7 weeks.    Total time spent on the date of this encounter doing chart review, review of test results, patient visit, the physical exam & procedure, education, counseling, developing this plan of  care, and documenting =  40   minutes.       Xochitl CROSS CNM

## 2022-11-09 ENCOUNTER — PRENATAL OFFICE VISIT (OUTPATIENT)
Dept: MIDWIFE SERVICES | Facility: CLINIC | Age: 47
End: 2022-11-09
Payer: COMMERCIAL

## 2022-11-09 VITALS
WEIGHT: 167 LBS | DIASTOLIC BLOOD PRESSURE: 62 MMHG | BODY MASS INDEX: 28.51 KG/M2 | SYSTOLIC BLOOD PRESSURE: 108 MMHG | HEIGHT: 64 IN | HEART RATE: 76 BPM

## 2022-11-09 DIAGNOSIS — R87.611 ATYPICAL SQUAMOUS CELLS CANNOT EXCLUDE HIGH GRADE SQUAMOUS INTRAEPITHELIAL LESION ON CYTOLOGIC SMEAR OF CERVIX (ASC-H): ICD-10-CM

## 2022-11-09 DIAGNOSIS — O24.410 GDM, CLASS A1: ICD-10-CM

## 2022-11-09 DIAGNOSIS — O99.810 ABNORMAL GLUCOSE AFFECTING PREGNANCY: ICD-10-CM

## 2022-11-09 DIAGNOSIS — Z78.9 USES BIRTH CONTROL: ICD-10-CM

## 2022-11-09 RX ORDER — ACETAMINOPHEN AND CODEINE PHOSPHATE 120; 12 MG/5ML; MG/5ML
0.35 SOLUTION ORAL DAILY
Qty: 90 TABLET | Refills: 1 | Status: SHIPPED | OUTPATIENT
Start: 2022-11-09 | End: 2023-04-24

## 2022-11-11 NOTE — PROGRESS NOTES
"S:  The patient is here for a post-op visit following a  section for a history of prior .  Her surgery was on 10/5/22.  She has been feeling well overall.  Her complaints are none.  Her lochia is decreasing.  Pain is under control.    O:  /70 (BP Location: Right arm, Patient Position: Sitting, Cuff Size: Adult Regular)   Pulse 80   Ht 1.626 m (5' 4\")   Wt 72.1 kg (159 lb)   LMP 2022 (Exact Date)  Body mass index is 27.29 kg/m .    Abdomen soft, non tender, non distended, incision healing well, uterus 14-16 week size    A:  Normal post-op check    P:  Activity and restrictions d/w the patient.  Follow up in 4 weeks with the CNMs for regular post-partum appointment.  "

## 2022-11-26 NOTE — PROGRESS NOTES
"Marley presents to the clinic by herself.  Happy to reiterate that she is completely discontinued use of THC about 1 month ago.  U tox on 2016 was positive for THC as patient anticipated.  Patient revealing the insight that she used THC and tobacco due to boredom, and she is finding ways to distract herself and fill her time.  Baby is active, and she denies regular uterine contractions, loss of fluid or vaginal bleeding.  Encourage daily fetal movement counting.  Reporting \"restless legs\" at night.  Recommend 1 iron supplement before bed, prescribed and sent to the patient's preferred pharmacy.  Lower leg cramps responded to increasing magnesium.  Complaining of lower extremity pruritus for the last few months.  Concerned about intrahepatic cholestasis of pregnancy (ICP).  Denies pruritus of palms and soles of feet.  Total bile acids and comprehensive metabolic panel obtained and other labs today: One-hour glucose challenge test, RPR and hemoglobin.  U tox ordered today.  Pertussis immunization accepted and given today, and patient understands fetal benefits.   labor and danger signs and symptoms reviewed.  All questions answered.  Encouraged to call or return to clinic with any questions, concerns, or as needed.  "
no

## 2022-12-05 ENCOUNTER — DOCUMENTATION ONLY (OUTPATIENT)
Dept: FAMILY MEDICINE | Facility: CLINIC | Age: 47
End: 2022-12-05

## 2022-12-12 ENCOUNTER — DOCUMENTATION ONLY (OUTPATIENT)
Dept: LAB | Facility: CLINIC | Age: 47
End: 2022-12-12

## 2022-12-13 DIAGNOSIS — O24.419 GESTATIONAL DIABETES MELLITUS (GDM), ANTEPARTUM, GESTATIONAL DIABETES METHOD OF CONTROL UNSPECIFIED: Primary | ICD-10-CM

## 2022-12-15 ENCOUNTER — MEDICAL CORRESPONDENCE (OUTPATIENT)
Dept: HEALTH INFORMATION MANAGEMENT | Facility: CLINIC | Age: 47
End: 2022-12-15

## 2023-01-18 ENCOUNTER — TELEPHONE (OUTPATIENT)
Dept: MIDWIFE SERVICES | Facility: CLINIC | Age: 48
End: 2023-01-18

## 2023-01-18 ENCOUNTER — LAB (OUTPATIENT)
Dept: LAB | Facility: CLINIC | Age: 48
End: 2023-01-18
Payer: COMMERCIAL

## 2023-01-18 ENCOUNTER — NURSE TRIAGE (OUTPATIENT)
Dept: NURSING | Facility: CLINIC | Age: 48
End: 2023-01-18

## 2023-01-18 ENCOUNTER — MEDICAL CORRESPONDENCE (OUTPATIENT)
Dept: LAB | Facility: CLINIC | Age: 48
End: 2023-01-18

## 2023-01-18 DIAGNOSIS — Z13.220 SCREENING FOR HYPERLIPIDEMIA: ICD-10-CM

## 2023-01-18 DIAGNOSIS — O24.410 GDM, CLASS A1: ICD-10-CM

## 2023-01-18 DIAGNOSIS — O24.419 GESTATIONAL DIABETES MELLITUS (GDM), ANTEPARTUM, GESTATIONAL DIABETES METHOD OF CONTROL UNSPECIFIED: ICD-10-CM

## 2023-01-18 LAB
GLUCOSE 2H P 75 G GLC PO SERPL-MCNC: 120 MG/DL (ref 60–152)
GLUCOSE P FAST SERPL-MCNC: 100 MG/DL (ref 60–91)
HBA1C MFR BLD: 5.3 % (ref 0–5.6)
HGB BLD-MCNC: 14.2 G/DL (ref 11.7–15.7)

## 2023-01-18 PROCEDURE — 82947 ASSAY GLUCOSE BLOOD QUANT: CPT

## 2023-01-18 PROCEDURE — 83036 HEMOGLOBIN GLYCOSYLATED A1C: CPT

## 2023-01-18 PROCEDURE — 82306 VITAMIN D 25 HYDROXY: CPT

## 2023-01-18 PROCEDURE — 85018 HEMOGLOBIN: CPT

## 2023-01-18 PROCEDURE — 36415 COLL VENOUS BLD VENIPUNCTURE: CPT

## 2023-01-18 PROCEDURE — 80061 LIPID PANEL: CPT

## 2023-01-18 PROCEDURE — 82951 GLUCOSE TOLERANCE TEST (GTT): CPT | Mod: 59

## 2023-01-18 NOTE — TELEPHONE ENCOUNTER
Pt has a couple questions about her glucose test from today. She was told her first fasting level was 80 and when she looks online it shows 100 and she's very confused. Please call her back to discuss

## 2023-01-18 NOTE — TELEPHONE ENCOUNTER
Patient calling with questions regarding lab results from earlier today. Nurse relayed message from provider but patient had additional questions. Requesting to speak with someone from the clinic. Was able to get her transferred over there.     Clark Roldan RN on 1/18/2023 at 4:58 PM    Reason for Disposition    Nursing judgment    Protocols used: INFORMATION ONLY CALL - NO TRIAGE-A-OH

## 2023-01-19 LAB — DEPRECATED CALCIDIOL+CALCIFEROL SERPL-MC: 45 UG/L (ref 20–75)

## 2023-01-23 ENCOUNTER — OFFICE VISIT (OUTPATIENT)
Dept: FAMILY MEDICINE | Facility: CLINIC | Age: 48
End: 2023-01-23
Payer: COMMERCIAL

## 2023-01-23 VITALS
RESPIRATION RATE: 16 BRPM | SYSTOLIC BLOOD PRESSURE: 119 MMHG | TEMPERATURE: 97.9 F | BODY MASS INDEX: 30.35 KG/M2 | HEART RATE: 77 BPM | WEIGHT: 177.8 LBS | DIASTOLIC BLOOD PRESSURE: 65 MMHG | OXYGEN SATURATION: 99 % | HEIGHT: 64 IN

## 2023-01-23 DIAGNOSIS — Z12.11 SCREEN FOR COLON CANCER: ICD-10-CM

## 2023-01-23 DIAGNOSIS — M65.4 DE QUERVAIN'S DISEASE (TENOSYNOVITIS): Primary | ICD-10-CM

## 2023-01-23 DIAGNOSIS — Z13.220 SCREENING FOR HYPERLIPIDEMIA: ICD-10-CM

## 2023-01-23 DIAGNOSIS — Z12.31 VISIT FOR SCREENING MAMMOGRAM: ICD-10-CM

## 2023-01-23 LAB
CHOLEST SERPL-MCNC: 212 MG/DL
HDLC SERPL-MCNC: 71 MG/DL
LDLC SERPL CALC-MCNC: 119 MG/DL
NONHDLC SERPL-MCNC: 141 MG/DL
TRIGL SERPL-MCNC: 109 MG/DL

## 2023-01-23 PROCEDURE — 99214 OFFICE O/P EST MOD 30 MIN: CPT | Performed by: FAMILY MEDICINE

## 2023-01-23 RX ORDER — IBUPROFEN 800 MG/1
800 TABLET, FILM COATED ORAL 3 TIMES DAILY
Qty: 42 TABLET | Refills: 0 | Status: SHIPPED | OUTPATIENT
Start: 2023-01-23 | End: 2023-02-06

## 2023-01-23 ASSESSMENT — PAIN SCALES - GENERAL: PAINLEVEL: MODERATE PAIN (5)

## 2023-01-23 NOTE — PROGRESS NOTES
"  Assessment & Plan     De Quervain's disease (tenosynovitis)  -Has 3.5 months old baby.  Finkelstein test positive.  -Had issues with carpal tunnel during pregnancy, currently denied any numbness or tingling in her fingers.    -Recommended thumb spica splint, NSAID trial, changing the way of carrying baby.  -Follow-up in 2 weeks to reassess.  If still persistent, referral to Ortho for injections.  -Notified side effects of heartburn and told to stop if she has  heartburn issues.    - ibuprofen (ADVIL/MOTRIN) 800 MG tablet; Take 1 tablet (800 mg) by mouth 3 times daily for 14 days    Visit for screening mammogram    - MA SCREENING DIGITAL BILAT - Future  (s+30); Future    Screen for colon cancer    - Colonoscopy Screening  Referral; Future    Screening for hyperlipidemia    - Lipid panel reflex to direct LDL Fasting; Future         BMI:   Estimated body mass index is 31 kg/m  as calculated from the following:    Height as of this encounter: 1.613 m (5' 3.5\").    Weight as of this encounter: 80.6 kg (177 lb 12.8 oz).           Return in about 2 weeks (around 2/6/2023) for Follow up.    Tom Carrington MD  [facility]    Stuart Roach is a 47 year old, presenting for the following health issues:  Musculoskeletal Problem (Wrist pain)      History of Present Illness       Reason for visit:  A sprained wrist  Symptom onset:  More than a month  Symptoms include:  Sore wrist joint  Symptom intensity:  Moderate  Symptom progression:  Staying the same  Had these symptoms before:  No  What makes it worse:  Carrying heavy stuff  What makes it better:  Carpal tunnel brace    She eats 2-3 servings of fruits and vegetables daily.She consumes 0 sweetened beverage(s) daily.She exercises with enough effort to increase her heart rate 10 to 19 minutes per day.  She exercises with enough effort to increase her heart rate 6 days per week.   She is taking medications " "regularly.     3.5 months baby  -pregnancy -had carpal tunnel        Pain History:  When did you first notice your pain? - Acute Pain   Have you seen anyone else for your pain? No  Where in your body do you have pain? Musculoskeletal problem/pain  Onset/Duration: 2.5 months  Description  Location: wrist - left  Joint Swelling: No  Redness: No  Pain: YES  Warmth: No  Intensity:  moderate  Progression of Symptoms:  worsening and same  Accompanying signs and symptoms:   Fevers: No  Numbness/tingling/weakness: YES- weakness  History  Trauma to the area: No  Recent illness:  No  Previous similar problem: YES- had carpal tunnel since 2012  Previous evaluation:  No  Precipitating or alleviating factors:  Aggravating factors include: lifting and overuse  Therapies tried and outcome: rest/inactivity, ice and support wrap          Review of Systems   Constitutional, HEENT, cardiovascular, pulmonary, gi and gu systems are negative, except as otherwise noted.      Objective    /65 (BP Location: Left arm, Patient Position: Sitting, Cuff Size: Adult Regular)   Pulse 77   Temp 97.9  F (36.6  C) (Tympanic)   Resp 16   Ht 1.613 m (5' 3.5\")   Wt 80.6 kg (177 lb 12.8 oz)   SpO2 99%   Breastfeeding Yes   BMI 31.00 kg/m    Body mass index is 31 kg/m .  Physical Exam   GENERAL: healthy, alert and no distress  NECK: no adenopathy, no asymmetry, masses, or scars and thyroid normal to palpation  RESP: lungs clear to auscultation - no rales, rhonchi or wheezes  CV: regular rate and rhythm, normal S1 S2, no S3 or S4, no murmur, click or rub, no peripheral edema and peripheral pulses strong  MS: no gross musculoskeletal defects noted, no edema                    "

## 2023-03-31 ENCOUNTER — OFFICE VISIT (OUTPATIENT)
Dept: FAMILY MEDICINE | Facility: CLINIC | Age: 48
End: 2023-03-31
Payer: COMMERCIAL

## 2023-03-31 ENCOUNTER — TELEPHONE (OUTPATIENT)
Dept: GASTROENTEROLOGY | Facility: CLINIC | Age: 48
End: 2023-03-31

## 2023-03-31 VITALS
HEIGHT: 64 IN | OXYGEN SATURATION: 96 % | BODY MASS INDEX: 29.94 KG/M2 | DIASTOLIC BLOOD PRESSURE: 64 MMHG | RESPIRATION RATE: 17 BRPM | SYSTOLIC BLOOD PRESSURE: 105 MMHG | HEART RATE: 82 BPM | TEMPERATURE: 98.2 F | WEIGHT: 175.4 LBS

## 2023-03-31 DIAGNOSIS — M65.4 DE QUERVAIN'S DISEASE (TENOSYNOVITIS): Primary | ICD-10-CM

## 2023-03-31 DIAGNOSIS — M54.50 ACUTE LEFT-SIDED LOW BACK PAIN WITHOUT SCIATICA: ICD-10-CM

## 2023-03-31 PROCEDURE — 99214 OFFICE O/P EST MOD 30 MIN: CPT | Performed by: FAMILY MEDICINE

## 2023-03-31 RX ORDER — CYCLOBENZAPRINE HCL 10 MG
5 TABLET ORAL 3 TIMES DAILY PRN
Qty: 30 TABLET | Refills: 1 | Status: SHIPPED | OUTPATIENT
Start: 2023-03-31 | End: 2024-03-05

## 2023-03-31 ASSESSMENT — ANXIETY QUESTIONNAIRES
2. NOT BEING ABLE TO STOP OR CONTROL WORRYING: SEVERAL DAYS
5. BEING SO RESTLESS THAT IT IS HARD TO SIT STILL: NOT AT ALL
IF YOU CHECKED OFF ANY PROBLEMS ON THIS QUESTIONNAIRE, HOW DIFFICULT HAVE THESE PROBLEMS MADE IT FOR YOU TO DO YOUR WORK, TAKE CARE OF THINGS AT HOME, OR GET ALONG WITH OTHER PEOPLE: SOMEWHAT DIFFICULT
7. FEELING AFRAID AS IF SOMETHING AWFUL MIGHT HAPPEN: NOT AT ALL
GAD7 TOTAL SCORE: 3
6. BECOMING EASILY ANNOYED OR IRRITABLE: NOT AT ALL
1. FEELING NERVOUS, ANXIOUS, OR ON EDGE: NOT AT ALL
4. TROUBLE RELAXING: SEVERAL DAYS
3. WORRYING TOO MUCH ABOUT DIFFERENT THINGS: SEVERAL DAYS
GAD7 TOTAL SCORE: 3

## 2023-03-31 ASSESSMENT — PAIN SCALES - GENERAL: PAINLEVEL: MODERATE PAIN (4)

## 2023-03-31 NOTE — TELEPHONE ENCOUNTER
Screening Questions  BLUE  KIND OF PREP RED  LOCATION [review exclusion criteria] GREEN  SEDATION TYPE        Y Are you active on mychart?       MARGARITO DOMÍNGUEZ Ordering/Referring Provider?        [facility] What type of coverage do you have?      N Have you had a positive covid test in the last 14 days?     29.2 1. BMI  [BMI 40+ - review exclusion criteria]    Y  2. Are you able to give consent for your medical care? [IF NO,RN REVIEW]          N  3. Are you taking any prescription pain medications on a routine schedule   (ex narcotics: oxycodone, roxicodone, oxycontin,  and percocet)? [RN Review]          3a. EXTENDED PREP What kind of prescription?     N 4. Do you have any chemical dependencies such as alcohol, street drugs, or methadone?        **If yes 3- 5 , please schedule with MAC sedation.**          IF YES TO ANY 6 - 10 - HOSPITAL SETTING ONLY.     N 6.   Do you need assistance transferring?     N 7.   Have you had a heart or lung transplant?    N 8.   Are you currently on dialysis?   N 9.   Do you use daily home oxygen?   N 10. Do you take nitroglycerin?   10a.  If yes, how often?     11. [FEMALES]  N Are you currently pregnant?    11a.  If yes, how many weeks? [ Greater than 12 weeks, OR NEEDED]    N 12. Do you have Pulmonary Hypertension? *NEED PAC APPT AT [facility] w/ MAC*     N 13. [review exclusion criteria]  Do you have any implantable devices in your body (pacemaker, defib, LVAD)?    N 14. In the past 6 months, have you had any heart related issues including cardiomyopathy or heart attack?     14a.  If yes, did it require cardiac stenting if so when?     N 15. Have you had a stroke or Transient ischemic attack (TIA - aka  mini stroke ) within 6 months?      N 16. Do you have mod to severe Obstructive Sleep Apnea?  [Hospital only]    N 17. Do you have SEVERE AND UNCONTROLLED asthma? *NEED PAC APPT AT [facility] w/MAC*     18. Are you currently taking any blood thinners?     18a. No. Continue to  "19.   18b.     N 19. Do you take the medication Phentermine?    19a. If yes, \"Hold for 7 days before procedure.  Please consult your prescribing provider if you have questions about holding this medication.\"     N  20. Do you have chronic kidney disease?      N  21. Do you have a diagnosis of diabetes?     N  22. On a regular basis do you go 3-5 days between bowel movements?      23. Preferred LOCAL Pharmacy for Pre Prescription    [ LIST ONLY ONE PHARMACY]     [id number] - [facility] [address] AT [facility] [address]        - CLOSING REMINDERS -    Informed patient they will need an adult    Cannot take any type of public or medical transportation alone    Conscious Sedation- Needs  for 6 hours after the procedure       MAC/General-Needs  for 24 hours after procedure    Pre-Procedure Covid test to be completed [[facility] PCR Testing Required]    Confirmed Nurse will call to complete assessment       - SCHEDULING DETAILS -  N Hospital Setting Required? If yes, what is the exclusion?:      Surgeon      Date of Procedure  Lower Endoscopy [Colonoscopy]  Type of Procedure Scheduled  [facility] [place]ALAX GATORADE WITHOUT MAGNEISUM CITRATE Which Colonoscopy Prep was Sent?     CS Sedation Type     N PAC / Pre-op Required           NEEDS TO FIND  AND  WILL CALL BACK      "

## 2023-03-31 NOTE — PROGRESS NOTES
"  Assessment & Plan     De Quervain's disease (tenosynovitis)  -Left de Quervain's tenosynovitis, still very bothersome after trial of splinting and NSAIDs.      - Orthopedic  Referral; Future    Acute left-sided low back pain without sciatica  -Has an infant at home, low back pain secondary to frequent carrying of bassinets.  -Preferred to try physical therapy.    - Physical Therapy Referral; Future  - cyclobenzaprine (FLEXERIL) 10 MG tablet; Take 0.5 tablets (5 mg) by mouth 3 times daily as needed for muscle spasms           BMI:   Estimated body mass index is 30.58 kg/m  as calculated from the following:    Height as of this encounter: 1.613 m (5' 3.5\").    Weight as of this encounter: 79.6 kg (175 lb 6.4 oz).           Tom Carrington MD  [facility] ELI Roach is a 47 year old, presenting for the following health issues:  No chief complaint on file.    Additional Questions 12/20/2021   Roomed by Yadiel MONZON   Accompanied by Self     History of Present Illness       Back Pain:  She presents for follow up of back pain. Patient's back pain is a recurring problem.  Location of back pain:  Left lower back  Description of back pain: dull ache  Back pain spreads: left buttocks    Since patient first noticed back pain, pain is: gradually worsening  Does back pain interfere with her job:  Yes      Reason for visit:  Tendenitis    She eats 2-3 servings of fruits and vegetables daily.She consumes 0 sweetened beverage(s) daily.She exercises with enough effort to increase her heart rate 20 to 29 minutes per day.  She exercises with enough effort to increase her heart rate 7 days per week.   She is taking medications regularly.       Pain History:  When did you first notice your pain? 5 months    Have you seen this provider for your pain in the past?   Yes   Where in your body do you have pain? Left wrist   Are you seeing anyone else for your pain? " No                Chronic Pain Follow Up:    Location of pain: low back  Analgesia/pain control:    - Recent changes:  infant    - Overall control: Tolerable with discomfort    - Current treatments:      PDMP Review     None        Last CSA Agreement:   CSA -- Patient Level:    CSA: None found at the patient level.       Last UDS:                 Review of Systems   Constitutional, HEENT, cardiovascular, pulmonary, gi and gu systems are negative, except as otherwise noted.      Objective    There were no vitals taken for this visit.  There is no height or weight on file to calculate BMI.  Physical Exam   GENERAL: healthy, alert and no distress  NECK: no adenopathy, no asymmetry, masses, or scars and thyroid normal to palpation  RESP: lungs clear to auscultation - no rales, rhonchi or wheezes  CV: regular rate and rhythm, normal S1 S2, no S3 or S4, no murmur, click or rub, no peripheral edema and peripheral pulses strong  ABDOMEN: soft, nontender, no hepatosplenomegaly, no masses and bowel sounds normal  MS: no gross musculoskeletal defects noted, no edema

## 2023-04-05 ENCOUNTER — MEDICAL CORRESPONDENCE (OUTPATIENT)
Dept: HEALTH INFORMATION MANAGEMENT | Facility: CLINIC | Age: 48
End: 2023-04-05
Payer: COMMERCIAL

## 2023-04-06 ENCOUNTER — HOSPITAL ENCOUNTER (OUTPATIENT)
Dept: PHYSICAL THERAPY | Facility: REHABILITATION | Age: 48
Discharge: HOME | End: 2023-04-06
Attending: FAMILY MEDICINE
Payer: COMMERCIAL

## 2023-04-06 DIAGNOSIS — M54.50 ACUTE LEFT-SIDED LOW BACK PAIN WITHOUT SCIATICA: ICD-10-CM

## 2023-04-06 PROCEDURE — 97110 THERAPEUTIC EXERCISES: CPT | Mod: GP

## 2023-04-06 PROCEDURE — 97161 PT EVAL LOW COMPLEX 20 MIN: CPT | Mod: GP

## 2023-04-06 NOTE — PROGRESS NOTES
[facility]    OUTPATIENT PHYSICAL THERAPY ORTHOPEDIC EVALUATION  PLAN OF TREATMENT FOR OUTPATIENT REHABILITATION  (COMPLETE FOR INITIAL CLAIMS ONLY)  Patient's Last Name, First Name, M.I.  YOB: 1975  ShirazMarley  MANUEL    Provider s Name:  [facility]   Medical Record No.  [id number]   Start of Care Date:  04/06/23   Onset Date:  03/31/23   Type:     _X__PT   ___OT   ___SLP Medical Diagnosis:  M54.50 (ICD-10-CM) - Acute left-sided low back pain without sciatica     PT Diagnosis:  Low back pain   Visits from SOC:  1      _________________________________________________________________________________  Plan of Treatment/Functional Goals:  balance training, fine motor coordination training, gait training, joint mobilization, manual therapy, neuromuscular re-education, ROM, strengthening, stretching, transfer training     TENS, Traction, Ultrasound     Goals  Goal Identifier: HEP  Goal Description: Initated HEP, Patient education, Verbal and tactile cues utilized to facilitate correct exercise technique  Target Date: 06/29/23    Goal Identifier: Walking     Target Date: 06/29/23          Target Date: 06/29/23          Target Date: 06/29/23                                                Therapy Frequency:  1 time/week  Predicted Duration of Therapy Intervention:  8-10 visits over 12 weeks    SHEFALI BROWN, PT                  04/06/23 1300   General Information   Type of Visit Initial OP Ortho PT Evaluation   Start of Care Date 04/06/23   Referring Physician Tom Jones MD   Patient/Family Goals Statement No more pain in back and hip   Orders Evaluate and Treat   Date of Order 03/31/23   Certification Required? Yes   Medical Diagnosis M54.50 (ICD-10-CM) - Acute left-sided low back pain without sciatica   Surgical/Medical history reviewed Yes    Precautions/Limitations no known precautions/limitations   Body Part(s)   Body Part(s) Lumbar Spine/SI   Presentation and Etiology   Pertinent history of current problem (include personal factors and/or comorbidities that impact the POC) Pt arrives today reporting low back pain and L sided hip pain that started 5 months prior, this was pretty quickly after pregnancy - pt had second C section. Has had pain and difficulty with lifting and carrying son, items for stroller and getting items in and out of car. Pain started increased the past week since using the treadmill. 5/10 pain today. Pain with bending, lifting, sitting, walking and running.   Impairments A. Pain;B. Decreased WB tolerance;D. Decreased ROM;E. Decreased flexibility;F. Decreased strength and endurance;G. Impaired balance   Functional Limitations perform activities of daily living;perform required work activities;perform desired leisure / sports activities   Symptom Location L hip pain   How/Where did it occur Other  (Post pregnancy)   Onset date of current episode/exacerbation 03/31/23   Chronicity Chronic   Pain rating (0-10 point scale) Best (/10);Worst (/10)   Best (/10) 2/10   Worst (/10) 6/10   Pain quality D. Burning;B. Dull;C. Aching   Frequency of pain/symptoms C. With activity   Pain/symptoms are: The same all the time   Pain/symptoms exacerbated by A. Sitting;B. Walking;C. Lifting;D. Carrying;I. Bending;J. ADL;K. Home tasks;L. Work tasks   Pain/symptoms eased by I. OTC medication(s);K. Other;H. Cold  (Muscle cream OTC)   Progression of symptoms since onset: Worsened   Current Level of Function   Patient role/employment history A. Employed   Fall Risk Screen   Fall screen completed by PT   Have you fallen 2 or more times in the past year? No   Have you fallen and had an injury in the past year? No   Is patient a fall risk? No   Abuse Screen (yes response referral indicated)   Feels Unsafe at Home or Work/School no   Feels Threatened by  Someone no   Does Anyone Try to Keep You From Having Contact with Others or Doing Things Outside Your Home? no   Physical Signs of Abuse Present no   Patient needs abuse support services and resources No   Lumbar Spine/SI Objective Findings   Gait/Locomotion Normal   Balance/Proprioception (Single Leg Stance) Normal   Hamstring Flexibility Limited B   Quadricep Flexibility Limited B   Piriformis Flexibility WFL and equal B   Flexion ROM To toes, no pain   Extension ROM Normal, painful   Right Side Bending ROM To knee, no pain   Left Side Bending ROM To knee, painful   Lumbar ROM Comment Burning muscle pain with L SB   Hip Screen WFL and equal B   Hip Flexion (L2) Strength 4/5 B   Hip Abduction Strength 4/5 B   Knee Flexion Strength 4/5 B   Knee Extension (L3) Strength 4/5 B   Ankle Dorsiflexion (L4) Strength 4/5 B   Great Toe Extension (L5) Strength 4/5 B   Ankle Plantar Flexion (S1) Strength 4/5 B   SLR - B   Segmental Mobility Normal   Palpation Tender to palpate at L QL lateral thigh burning pain   Planned Therapy Interventions   Planned Therapy Interventions balance training;fine motor coordination training;gait training;joint mobilization;manual therapy;neuromuscular re-education;ROM;strengthening;stretching;transfer training   Planned Modality Interventions   Planned Modality Interventions TENS;Traction;Ultrasound   Clinical Impression   Criteria for Skilled Therapeutic Interventions Met yes, treatment indicated   PT Diagnosis Low back pain   Influenced by the following impairments Pain, decreased strength and ROM, decreased activity tolerance   Functional limitations due to impairments Walking, bending, standing   Clinical Presentation Stable/Uncomplicated   Clinical Presentation Rationale Clinical judgement   Clinical Decision Making (Complexity) Low complexity   Therapy Frequency 1 time/week   Predicted Duration of Therapy Intervention (days/wks) 8-10 visits over 12 weeks   Risk & Benefits of therapy have  been explained Yes   Patient, Family & other staff in agreement with plan of care Yes   Clinical Impression Comments 48 yo female presents to PT with low back pain and L sided burning thigh pain starting 5 months prior. Pain started after giving birth (C section), son is now 6 months old. Persistent burning with prolonged walking, bending and carrying. Upon evaulation findings suggest core weakness post partum and possible femoral nerve irritation. Pt would benefit from skilled PT to progress function.   ORTHO GOALS   PT Ortho Eval Goals 1;2;3;4   Ortho Goal 1   Goal Identifier HEP   Goal Description Initated HEP, Patient education, Verbal and tactile cues utilized to facilitate correct exercise technique   Target Date 06/29/23   Ortho Goal 2   Goal Identifier Walking   Target Date 06/29/23   Ortho Goal 3   Target Date 06/29/23   Ortho Goal 4   Target Date 06/29/23   Total Evaluation Time   PT Eval, Low Complexity Minutes (31560) 25   Therapy Certification   Certification date from 04/06/23   Certification date to 06/29/23   Medical Diagnosis M54.50 (ICD-10-CM) - Acute left-sided low back pain without sciatica     I CERTIFY THE NEED FOR THESE SERVICES FURNISHED UNDER        THIS PLAN OF TREATMENT AND WHILE UNDER MY CARE     (Physician co-signature of this document indicates review and certification of the therapy plan).                     Certification Date From:  04/06/23   Certification Date To:  06/29/23    Referring Provider:  Tom Jones MD    Initial Assessment        See Epic Evaluation Start of Care Date: 04/06/23

## 2023-04-06 NOTE — PROGRESS NOTES
[facility]    OUTPATIENT PHYSICAL THERAPY ORTHOPEDIC EVALUATION  PLAN OF TREATMENT FOR OUTPATIENT REHABILITATION  (COMPLETE FOR INITIAL CLAIMS ONLY)  Patient's Last Name, First Name, M.I.  YOB: 1975  ShirazMarley  MANUEL    Provider s Name:  [facility]   Medical Record No.  [id number]   Start of Care Date:  (P) 04/06/23   Onset Date:  (P) 03/31/23   Type:     _X__PT   ___OT   ___SLP Medical Diagnosis:  (P) M54.50 (ICD-10-CM) - Acute left-sided low back pain without sciatica     PT Diagnosis:  (P) Low back pain   Visits from SOC:  1      _________________________________________________________________________________  Plan of Treatment/Functional Goals:  (P) balance training, fine motor coordination training, gait training, joint mobilization, manual therapy, neuromuscular re-education, ROM, strengthening, stretching, transfer training     (P) TENS, Traction, Ultrasound     Goals  Goal Identifier: (P) HEP  Goal Description: (P) Initated HEP, Patient education, Verbal and tactile cues utilized to facilitate correct exercise technique  Target Date: (P) 06/29/23    Goal Identifier: (P) Bending  Goal Description: (P) pt will be able to bend with pain level <2/10 for chores and ADLs  Target Date: (P) 06/29/23    Goal Identifier: (P) Lifting  Goal Description: (P) Pt will be able to carry son with pain level <2/10 for childcare duties  Target Date: (P) 06/29/23    Goal Identifier: (P) Walking  Goal Description: Pt will be able to walk on treadmill with pain level <2/10 for physical activity  Target Date: (P) 06/29/23                                                Therapy Frequency:  (P) 1 time/week  Predicted Duration of Therapy Intervention:  (P) 8-10 visits over 12 weeks    SHEFALI BROWN, PT                  04/06/23 1300   General Information   Type of Visit Initial OP  Ortho PT Evaluation   Start of Care Date 04/06/23   Referring Physician Tom Jones MD   Patient/Family Goals Statement No more pain in back and hip   Orders Evaluate and Treat   Date of Order 03/31/23   Certification Required? Yes   Medical Diagnosis M54.50 (ICD-10-CM) - Acute left-sided low back pain without sciatica   Surgical/Medical history reviewed Yes   Precautions/Limitations no known precautions/limitations   Body Part(s)   Body Part(s) Lumbar Spine/SI   Presentation and Etiology   Pertinent history of current problem (include personal factors and/or comorbidities that impact the POC) Pt arrives today reporting low back pain and L sided hip pain that started 5 months prior, this was pretty quickly after pregnancy - pt had second C section. Has had pain and difficulty with lifting and carrying son, items for stroller and getting items in and out of car. Pain started increased the past week since using the treadmill. 5/10 pain today. Pain with bending, lifting, sitting, walking and running.   Impairments A. Pain;B. Decreased WB tolerance;D. Decreased ROM;E. Decreased flexibility;F. Decreased strength and endurance;G. Impaired balance   Functional Limitations perform activities of daily living;perform required work activities;perform desired leisure / sports activities   Symptom Location L hip pain   How/Where did it occur Other  (Post pregnancy)   Onset date of current episode/exacerbation 03/31/23   Chronicity Chronic   Pain rating (0-10 point scale) Best (/10);Worst (/10)   Best (/10) 2/10   Worst (/10) 6/10   Pain quality D. Burning;B. Dull;C. Aching   Frequency of pain/symptoms C. With activity   Pain/symptoms are: The same all the time   Pain/symptoms exacerbated by A. Sitting;B. Walking;C. Lifting;D. Carrying;I. Bending;J. ADL;K. Home tasks;L. Work tasks   Pain/symptoms eased by I. OTC medication(s);K. Other;H. Cold  (Muscle cream OTC)   Progression of symptoms since onset:  Worsened   Current Level of Function   Patient role/employment history A. Employed   Fall Risk Screen   Fall screen completed by PT   Have you fallen 2 or more times in the past year? No   Have you fallen and had an injury in the past year? No   Is patient a fall risk? No   Abuse Screen (yes response referral indicated)   Feels Unsafe at Home or Work/School no   Feels Threatened by Someone no   Does Anyone Try to Keep You From Having Contact with Others or Doing Things Outside Your Home? no   Physical Signs of Abuse Present no   Patient needs abuse support services and resources No   Lumbar Spine/SI Objective Findings   Gait/Locomotion Normal   Balance/Proprioception (Single Leg Stance) Normal   Hamstring Flexibility Limited B   Quadricep Flexibility Limited B   Piriformis Flexibility WFL and equal B   Flexion ROM To toes, no pain   Extension ROM Normal, painful   Right Side Bending ROM To knee, no pain   Left Side Bending ROM To knee, painful   Lumbar ROM Comment Burning muscle pain with L SB   Hip Screen WFL and equal B   Hip Flexion (L2) Strength 4/5 B   Hip Abduction Strength 4/5 B   Knee Flexion Strength 4/5 B   Knee Extension (L3) Strength 4/5 B   Ankle Dorsiflexion (L4) Strength 4/5 B   Great Toe Extension (L5) Strength 4/5 B   Ankle Plantar Flexion (S1) Strength 4/5 B   SLR - B   Segmental Mobility Normal   Palpation Tender to palpate at L QL lateral thigh burning pain   Planned Therapy Interventions   Planned Therapy Interventions balance training;fine motor coordination training;gait training;joint mobilization;manual therapy;neuromuscular re-education;ROM;strengthening;stretching;transfer training   Planned Modality Interventions   Planned Modality Interventions TENS;Traction;Ultrasound   Clinical Impression   Criteria for Skilled Therapeutic Interventions Met yes, treatment indicated   PT Diagnosis Low back pain   Influenced by the following impairments Pain, decreased strength and ROM, decreased  activity tolerance   Functional limitations due to impairments Walking, bending, standing   Clinical Presentation Stable/Uncomplicated   Clinical Presentation Rationale Clinical judgement   Clinical Decision Making (Complexity) Low complexity   Therapy Frequency 1 time/week   Predicted Duration of Therapy Intervention (days/wks) 8-10 visits over 12 weeks   Risk & Benefits of therapy have been explained Yes   Patient, Family & other staff in agreement with plan of care Yes   Clinical Impression Comments 48 yo female presents to PT with low back pain and L sided burning thigh pain starting 5 months prior. Pain started after giving birth (C section), son is now 6 months old. Persistent burning with prolonged walking, bending and carrying. Upon evaulation findings suggest core weakness post partum and possible femoral nerve irritation. Pt would benefit from skilled PT to progress function.   ORTHO GOALS   PT Ortho Eval Goals 1;2;3;4   Ortho Goal 1   Goal Identifier HEP   Goal Description Initated HEP, Patient education, Verbal and tactile cues utilized to facilitate correct exercise technique   Target Date 06/29/23   Ortho Goal 2   Goal Identifier Bending   Goal Description pt will be able to bend with pain level <2/10 for chores and ADLs   Target Date 06/29/23   Ortho Goal 3   Goal Identifier Lifting   Goal Description Pt will be able to carry son with pain level <2/10 for childcare duties   Target Date 06/29/23   Ortho Goal 4   Goal Identifier Walking   Goal Description Pt will be able to walk on treadmill with pain level <2/10 for physical activity   Target Date 06/29/23   Total Evaluation Time   PT Marycarmen Low Complexity Minutes ([id number]) 25   Therapy Certification   Certification date from 04/06/23   Certification date to 06/29/23   Medical Diagnosis M54.50 (ICD-10-CM) - Acute left-sided low back pain without sciatica     I CERTIFY THE NEED FOR THESE SERVICES FURNISHED UNDER        THIS PLAN OF TREATMENT AND WHILE  UNDER MY CARE     (Physician co-signature of this document indicates review and certification of the therapy plan).                     Certification Date From:  (P) 04/06/23   Certification Date To:  (P) 06/29/23    Referring Provider:  (BHAVESH) Tom Jones MD    Initial Assessment        See Epic Evaluation Start of Care Date: (P) 04/06/23

## 2023-05-03 ENCOUNTER — HOSPITAL ENCOUNTER (OUTPATIENT)
Dept: PHYSICAL THERAPY | Facility: REHABILITATION | Age: 48
Discharge: HOME | End: 2023-05-03
Payer: COMMERCIAL

## 2023-05-03 DIAGNOSIS — M54.50 ACUTE LEFT-SIDED LOW BACK PAIN WITHOUT SCIATICA: ICD-10-CM

## 2023-05-03 DIAGNOSIS — M62.81 GENERALIZED MUSCLE WEAKNESS: Primary | ICD-10-CM

## 2023-05-03 PROCEDURE — 97110 THERAPEUTIC EXERCISES: CPT | Mod: GP | Performed by: PHYSICAL THERAPY ASSISTANT

## 2023-05-08 NOTE — PROGRESS NOTES
Chief Complaint:   Chief Complaint   Patient presents with     Left Hand - Pain     Left de Quervain's tenosynovitis. 3/31/23 went to [facility] and is wearing a thumb spica brace and took ibuprofen for 10 days with little relief. The brace helps. She has a 7 month old. Baby carrier aggravates wrist. Also a  on the weekends which is challenging.       Marley Weldon is seen today in the [facility] for evaluation of left wrist pain at the request of Dr. Tom Lockhart*       HPI: Marley Weldon is a 47 year old female , right -hand dominant, who presents for evaluation and management of a left wrist pain, no known injury. Pain has been present since 11/2022. Since that time, pain has waxed and waned. Was worse ~1/2023. Has been wearing a thumb brace since about 3 months, seems to help some.       Has a 7 month old son, started shortly after his birth. She is breastfeeding evenings.    Pain worse with lifting son out of car carrier, crib, etc.    hgb A1c=5.3 on 1/18/2023      She reports having mild pain/discomfort around the wrist site. She denies associated numbness or tingling. She denies any other injuries to her upper extremity.   Symptoms: pain.  Location of symptoms: radial left wrist.  Pain severity: 5/10  Pain quality: aching, sharp and stabbing  Frequency of symptoms: frequently  Aggravating factors: lifting child, certain movements.  Relieving factors: rest, brace.    Previous treatment: brace, ibuprofen.    Past medical history:  has a past medical history of Abnormal Pap smear of cervix, Allergic, Depression, HPV (human papilloma virus) infection, Migraine, Seizures (H), and Varicella.   Patient Active Problem List    Diagnosis Date Noted     Acute left-sided low back pain without sciatica 04/06/2023     Priority: Medium     Pruritus of palm 08/22/2022     Priority: Medium     8/22/22: assessed for palms itching.  Patient has significant carpal tunnel and states  that her hands go numb sometimes.  Then in the place where the carpal tunnel is she gets itching when the feeling starts to come back.  She sometimes has itching on her feet as well but she thinks this might be bug bites (not on the soles of her feet).   CBC, CMP, bile salts drawn.  Twice daily fetal movement kick counts recommended    22: Bile acid result= 1       Diet controlled gestational diabetes mellitus (GDM), antepartum 2022     Priority: Medium     GDM diagnosed doing well. Feeling better.       Anemia during pregnancy 2022     Priority: Medium     Abnormal glucose affecting pregnancy 2022     Priority: Medium     Elevated 1 hr GCT: 163  3hr GTT abnormal  Dx GDM       Supervision of high-risk pregnancy of elderly multigravida 2022     Priority: Medium     Clinic/H[facility]: [facility]  Partner Name: Clark  Ultrasound predicts sex: BOY  Childrens names/ages: 6 yo Delilah  Previous labor experiences:  for fetal distress, wants repeat C-sec  Waterbirth (interest, declined, ineligible): ineligible  Level of education/occupation: Assoc. Degree,  at [employer]   Pertinent History:  section, Age 47 at EDB, anxiety, ADHD, BMI 26, GDM  PP contraception: POPs  Hx PPD/Depression/Anxiety: yes, anxiety and ADHD  Peds provider:  [facility]  Plans for labor pain management: repeat C-sec planned  Plans for post partum recovery/time off: 8 wks, play it by ear, only works [employer]  Feeding preference: breast  Breast pump: will need Rx in hosp.  Car seat: needs  Circumcision: unsure  Discussed 2 week visit:  Tdap: 22  Flu: declines until after baby is born  COVID: Vaccinated x 2, due for booster      ?                 Antepartum multigravida of advanced maternal age 2022     Priority: Medium     Advanced maternal age is defined as age at or greater than 35 at delivery.  Being at or greater than the age of 40 when you give birth to your baby further increases your risk in pregnancy and  birth.      Risks that increase:    Gestational diabetes    Aneuploidy and congenital anomalies    Hypertension    Abruption or placenta previa    Postpartum hemorrhage     birth/low birthweight     section    Stillbirth     These risks increased further if the woman is -American or if this is her first pregnancy.    You will be offered genetic screening tests to assess your risk for genetic and chromosomal problems with your baby.  You are not required to the do them, however.    Rates of stillbirth increase as maternal age and gestational age increase.  It is highest around 41 weeks of pregnancy, but begin increasing after 37-38 weeks.     birth risk is highest for those who choose to wait for labor to begin on its own but end up with an induction for postterm pregnancy, greater than 42 weeks.    Women who are of advanced maternal age are at greater risk for failed induction of labor.    There are fewer benefits to women of advanced maternal age who choose elective  section versus those who are of younger age.    Few quality studies have been done regarding benefit of  testing or induction of labor for women who are of advanced maternal age.  There is no consensus on recommendations for testing or induction amongst professional organizations at this time.    Our current recommendations:    We may recommend a growth ultrasound be done at 32 weeks of pregnancy.    We recommend weekly  testing, including biophysical profile and nonstress test, beginning at 36 weeks of pregnancy in hopes of reducing stillbirth.    We offer induction of labor at or after 39 weeks.    Fewer interventions occur during labor and birth with midwifery-led care.    As always, you are integral in the decisions made about your care.  You have the right to decline recommended interventions or tests.  We invite you to talk with your family and care provider to decide what is best for  you.             BMI 27.0-27.9,adult 2022     Priority: Medium     TWG 15-25lb recommended in pregnancy       Attention deficit disorder without hyperactivity      Priority: Medium     3/21/22: Managed by [facility] (though provider recently left), stopped Adderall with+ UPT. Recommended establishing care with a different [facility] provider and close pregnancy follow based on symptoms.        Atypical squamous cells cannot exclude high grade squamous intraepithelial lesion on cytologic smear of cervix (ASC-H) 2017     Priority: Medium     5/10/11 NIL  14 ASCUS, neg HPV  17 ASC-H, +HR HPV (unknown strain)  17 colpo bx JOI I  11/3/20 NIL pap, neg HPV. Plan: cotest in 3 years per provider              delivery delivered 2017     Priority: Medium     2017 for fetal distress  [facility] at [facility] on 4/3/2017    -Desires TOLAC: ___desires repeat  section____  -OP report available in Epic on 4/3/17  - calculator 59.8% success rate  -Physician referral placed and done  -OP report obtained and reviewed by Dr. Black  -Physician consult 22 with Dr. Black  -Consent signed (by 37 weeks) ___________ (date)   -Patient informed of: admission labs (hgb and T&S and continuous fetal monitoring ___       Anxiety 2017     Priority: Medium      3/21/22: Managed by [facility] (though provider recently left), on Zoloft 50 mg daily.   Recommended establishing care with a different [facility] provider and close pregnancy follow based on symptoms.            Past surgical history:  has a past surgical history that includes Mouth surgery;  Section (N/A, 4/3/2017); and  section (N/A, 10/5/2022).     Medications:    Current Outpatient Medications   Medication Sig Dispense Refill     cyclobenzaprine (FLEXERIL) 10 MG tablet Take 0.5 tablets (5 mg) by mouth 3 times daily as needed for muscle spasms 30 tablet 1     norethindrone (MICRONOR) 0.35 MG tablet TAKE 1  "TABLET(0.35 MG) BY MOUTH DAILY 84 tablet 1     Prenatal Vit-Fe Fumarate-FA (PRENATAL 19) 29-1 MG CHEW Take 1 tablet by mouth daily (Patient not taking: Reported on 3/31/2023) 60 tablet 3     sertraline (ZOLOFT) 50 MG tablet [SERTRALINE (ZOLOFT) 50 MG TABLET] Take 1 tablet (50 mg total) by mouth daily. 50 tablet 6        Allergies:   No Known Allergies     Family History: family history includes Breast Cancer in her paternal grandmother; Cancer in her maternal grandmother; Dementia in her paternal grandmother; No Known Problems in her father and mother.     Social History: works as a / on weekends.  reports that she has quit smoking. She has never used smokeless tobacco. She reports that she does not currently use alcohol after a past usage of about 2.0 standard drinks of alcohol per week. She reports that she does not currently use drugs.    Review of Systems:  ROS: 10 point ROS neg other than the symptoms noted above in the HPI and past medical history.    Physical Exam  GENERAL APPEARANCE: healthy, alert, no distress. Accompanied by her 7month old son  SKIN: no suspicious lesions or rashes  NEURO: Normal strength and tone, mentation intact and speech normal  PSYCH:  mentation appears normal and affect normal. Not anxious.  RESPIRATORY: No increased work of breathing.    Ht 1.613 m (5' 3.5\")   Wt 79.4 kg (175 lb)   BMI 30.51 kg/m       left HAND / WRIST EXAM:    Skin intact. No abnormal skin discoloration, erythema or ecchymosis.   Normal wear pattern, color and tone.  No observable or palpable masses of the fingers or palm or wrist.  No palpable triggering of fingers.   No observable or palpable cords or nodules of the fingers or palm.    There is no swelling in the wrist, hand, 1st extensor compartmet of the wrist.  There is moderate tenderness in the 1st dorsal compartment of the wrist.  There is no ecchymosis.  There is no erythema of the surrounding skin.  There is no maceration of the " skin.  There is no deformity in the area.  Intact extensors. No extensor lag.    Special tests wrist:    Tinel's negative,    Phalen's negative.    Flexion/compression test negative.   Finkelstein's test: positive.    1st carpometacarpal grind: negative    Intact sensation light touch median, radial, ulnar nerves of the hand  Intact sensation to the radial and ulnar digital nerves of the fingers, as well as the finger tips.  Intact epl fpl fdp edc wrist flexion/extension biceps/triceps deltoid  Brisk capillary refill to all fingers.   Palpable radial pulse, 2+.    X-rays: none.    Assessment: 46yo RHD female with chronic left wrist pain, dequervains tenosynovitis.    Plan:  Nonoperative treatment. Expect improvement in most cases without surgery.  * Rest. Immobilization in removable thumb spica splints / braces.  * will refer to [facility] for thumb based handsplints, as well as treatment modalities as indicated.  * Activity modification - avoid activities that aggravate symptoms.  * NSAIDS - regular use for inflammation, with food, as long as no contra-indications. Please discuss with pcp and pediatrician if needed, especially if breastfeeding  * Ice twice daily to three times daily.  * Tylenol as needed for pain  * Injections: consider in future should pain persist despite trial of mentioned recommendations. Please discuss with pediatrician as she's breastfeeding.  * Return to clinic  as needed.            Eleuterio Liu M.D., M.S.  Dept. of Orthopaedic Surgery  [facility]

## 2023-05-12 ENCOUNTER — OFFICE VISIT (OUTPATIENT)
Dept: ORTHOPEDICS | Facility: CLINIC | Age: 48
End: 2023-05-12
Payer: COMMERCIAL

## 2023-05-12 VITALS — BODY MASS INDEX: 29.88 KG/M2 | WEIGHT: 175 LBS | HEIGHT: 64 IN

## 2023-05-12 DIAGNOSIS — M65.4 TENOSYNOVITIS, DE QUERVAIN: Primary | ICD-10-CM

## 2023-05-12 PROCEDURE — 99203 OFFICE O/P NEW LOW 30 MIN: CPT | Performed by: ORTHOPAEDIC SURGERY

## 2023-05-12 ASSESSMENT — PAIN SCALES - GENERAL: PAINLEVEL: MODERATE PAIN (5)

## 2023-05-12 NOTE — LETTER
5/12/2023         RE: Marley Weldon  [address]  [place] [address]        Dear Colleague,    Thank you for referring your patient, Marley Weldon, to the [facility]. Please see a copy of my visit note below.    Chief Complaint:   Chief Complaint   Patient presents with     Left Hand - Pain     Left de Quervain's tenosynovitis. 3/31/23 went to [facility] and is wearing a thumb spica brace and took ibuprofen for 10 days with little relief. The brace helps. She has a 7 month old. Baby carrier aggravates wrist. Also a  on the weekends which is challenging.       Marley Weldon is seen today in the [facility] for evaluation of left wrist pain at the request of Dr. Tom Lockhart*       HPI: Marley Weldon is a 47 year old female , right -hand dominant, who presents for evaluation and management of a left wrist pain, no known injury. Pain has been present since 11/2022. Since that time, pain has waxed and waned. Was worse ~1/2023. Has been wearing a thumb brace since about 3 months, seems to help some.       Has a 7 month old son, started shortly after his birth. She is breastfeeding evenings.    Pain worse with lifting son out of car carrier, crib, etc.    hgb A1c=5.3 on 1/18/2023      She reports having mild pain/discomfort around the wrist site. She denies associated numbness or tingling. She denies any other injuries to her upper extremity.   Symptoms: pain.  Location of symptoms: radial left wrist.  Pain severity: 5/10  Pain quality: aching, sharp and stabbing  Frequency of symptoms: frequently  Aggravating factors: lifting child, certain movements.  Relieving factors: rest, brace.    Previous treatment: brace, ibuprofen.    Past medical history:  has a past medical history of Abnormal Pap smear of cervix, Allergic, Depression, HPV (human papilloma virus) infection, Migraine, Seizures (H), and Varicella.   Patient Active Problem  List    Diagnosis Date Noted     Acute left-sided low back pain without sciatica 2023     Priority: Medium     Pruritus of palm 2022     Priority: Medium     22: assessed for palms itching.  Patient has significant carpal tunnel and states that her hands go numb sometimes.  Then in the place where the carpal tunnel is she gets itching when the feeling starts to come back.  She sometimes has itching on her feet as well but she thinks this might be bug bites (not on the soles of her feet).   CBC, CMP, bile salts drawn.  Twice daily fetal movement kick counts recommended    22: Bile acid result= 1       Diet controlled gestational diabetes mellitus (GDM), antepartum 2022     Priority: Medium     GDM diagnosed doing well. Feeling better.       Anemia during pregnancy 2022     Priority: Medium     Abnormal glucose affecting pregnancy 2022     Priority: Medium     Elevated 1 hr GCT: 163  3hr GTT abnormal  Dx GDM       Supervision of high-risk pregnancy of elderly multigravida 2022     Priority: Medium     Cl[facility]/H[facility]  Partner Name: Clark  Ultrasound predicts sex: BOY  Childrens names/ages: 4 yo Delilah  Previous labor experiences:  for fetal distress, wants repeat C-sec  Waterbirth (interest, declined, ineligible): ineligible  Level of education/occupation: Assoc. Degree,  at [employer]   Pertinent History:  section, Age 47 at EDB, anxiety, ADHD, BMI 26, GDM  PP contraception: POPs  Hx PPD/Depression/Anxiety: yes, anxiety and ADHD  Peds provider:  [facility]  Plans for labor pain management: repeat C-sec planned  Plans for post partum recovery/time off: 8 wks, play it by ear, only works [employer]  Feeding preference: breast  Breast pump: will need Rx in hosp.  Car seat: needs  Circumcision: unsure  Discussed 2 week visit:  Tdap: 22  Flu: declines until after baby is born  COVID: Vaccinated x 2, due for booster      ?                 Antepartum  multigravida of advanced maternal age 2022     Priority: Medium     Advanced maternal age is defined as age at or greater than 35 at delivery.  Being at or greater than the age of 40 when you give birth to your baby further increases your risk in pregnancy and birth.      Risks that increase:    Gestational diabetes    Aneuploidy and congenital anomalies    Hypertension    Abruption or placenta previa    Postpartum hemorrhage     birth/low birthweight     section    Stillbirth     These risks increased further if the woman is -American or if this is her first pregnancy.    You will be offered genetic screening tests to assess your risk for genetic and chromosomal problems with your baby.  You are not required to the do them, however.    Rates of stillbirth increase as maternal age and gestational age increase.  It is highest around 41 weeks of pregnancy, but begin increasing after 37-38 weeks.     birth risk is highest for those who choose to wait for labor to begin on its own but end up with an induction for postterm pregnancy, greater than 42 weeks.    Women who are of advanced maternal age are at greater risk for failed induction of labor.    There are fewer benefits to women of advanced maternal age who choose elective  section versus those who are of younger age.    Few quality studies have been done regarding benefit of  testing or induction of labor for women who are of advanced maternal age.  There is no consensus on recommendations for testing or induction amongst professional organizations at this time.    Our current recommendations:    We may recommend a growth ultrasound be done at 32 weeks of pregnancy.    We recommend weekly  testing, including biophysical profile and nonstress test, beginning at 36 weeks of pregnancy in hopes of reducing stillbirth.    We offer induction of labor at or after 39 weeks.    Fewer interventions occur during  labor and birth with midwifery-led care.    As always, you are integral in the decisions made about your care.  You have the right to decline recommended interventions or tests.  We invite you to talk with your family and care provider to decide what is best for you.             BMI 27.0-27.9,adult 2022     Priority: Medium     TWG 15-25lb recommended in pregnancy       Attention deficit disorder without hyperactivity      Priority: Medium     3/21/22: Managed by [facility] (though provider recently left), stopped Adderall with+ UPT. Recommended establishing care with a different [facility] provider and close pregnancy follow based on symptoms.        Atypical squamous cells cannot exclude high grade squamous intraepithelial lesion on cytologic smear of cervix (ASC-H) 2017     Priority: Medium     5/10/11 NIL  14 ASCUS, neg HPV  17 ASC-H, +HR HPV (unknown strain)  17 colpo bx JOI I  11/3/20 NIL pap, neg HPV. Plan: cotest in 3 years per provider              delivery delivered 2017     Priority: Medium     2017 for fetal distress  [facility] at [facility] on 4/3/2017    -Desires TOLAC: ___desires repeat  section____  -OP report available in Epic on 4/3/17  - calculator 59.8% success rate  -Physician referral placed and done  -OP report obtained and reviewed by Dr. Black  -Physician consult 22 with Dr. Black  -Consent signed (by 37 weeks) ___________ (date)   -Patient informed of: admission labs (hgb and T&S and continuous fetal monitoring ___       Anxiety 2017     Priority: Medium      3/21/22: Managed by [facility] (though provider recently left), on Zoloft 50 mg daily.   Recommended establishing care with a different [facility] provider and close pregnancy follow based on symptoms.            Past surgical history:  has a past surgical history that includes Mouth surgery;  Section (N/A, 4/3/2017); and  section (N/A, 10/5/2022).  "    Medications:    Current Outpatient Medications   Medication Sig Dispense Refill     cyclobenzaprine (FLEXERIL) 10 MG tablet Take 0.5 tablets (5 mg) by mouth 3 times daily as needed for muscle spasms 30 tablet 1     norethindrone (MICRONOR) 0.35 MG tablet TAKE 1 TABLET(0.35 MG) BY MOUTH DAILY 84 tablet 1     Prenatal Vit-Fe Fumarate-FA (PRENATAL 19) 29-1 MG CHEW Take 1 tablet by mouth daily (Patient not taking: Reported on 3/31/2023) 60 tablet 3     sertraline (ZOLOFT) 50 MG tablet [SERTRALINE (ZOLOFT) 50 MG TABLET] Take 1 tablet (50 mg total) by mouth daily. 50 tablet 6        Allergies:   No Known Allergies     Family History: family history includes Breast Cancer in her paternal grandmother; Cancer in her maternal grandmother; Dementia in her paternal grandmother; No Known Problems in her father and mother.     Social History: works as a / on weekends.  reports that she has quit smoking. She has never used smokeless tobacco. She reports that she does not currently use alcohol after a past usage of about 2.0 standard drinks of alcohol per week. She reports that she does not currently use drugs.    Review of Systems:  ROS: 10 point ROS neg other than the symptoms noted above in the HPI and past medical history.    Physical Exam  GENERAL APPEARANCE: healthy, alert, no distress. Accompanied by her 7month old son  SKIN: no suspicious lesions or rashes  NEURO: Normal strength and tone, mentation intact and speech normal  PSYCH:  mentation appears normal and affect normal. Not anxious.  RESPIRATORY: No increased work of breathing.    Ht 1.613 m (5' 3.5\")   Wt 79.4 kg (175 lb)   BMI 30.51 kg/m       left HAND / WRIST EXAM:    Skin intact. No abnormal skin discoloration, erythema or ecchymosis.   Normal wear pattern, color and tone.  No observable or palpable masses of the fingers or palm or wrist.  No palpable triggering of fingers.   No observable or palpable cords or nodules of the fingers or " palm.    There is no swelling in the wrist, hand, 1st extensor compartmet of the wrist.  There is moderate tenderness in the 1st dorsal compartment of the wrist.  There is no ecchymosis.  There is no erythema of the surrounding skin.  There is no maceration of the skin.  There is no deformity in the area.  Intact extensors. No extensor lag.    Special tests wrist:    Tinel's negative,    Phalen's negative.    Flexion/compression test negative.   Finkelstein's test: positive.    1st carpometacarpal grind: negative    Intact sensation light touch median, radial, ulnar nerves of the hand  Intact sensation to the radial and ulnar digital nerves of the fingers, as well as the finger tips.  Intact epl fpl fdp edc wrist flexion/extension biceps/triceps deltoid  Brisk capillary refill to all fingers.   Palpable radial pulse, 2+.    X-rays: none.    Assessment: 46yo RHD female with chronic left wrist pain, dequervains tenosynovitis.    Plan:  Nonoperative treatment. Expect improvement in most cases without surgery.  * Rest. Immobilization in removable thumb spica splints / braces.  * will refer to [facility] for thumb based handsplints, as well as treatment modalities as indicated.  * Activity modification - avoid activities that aggravate symptoms.  * NSAIDS - regular use for inflammation, with food, as long as no contra-indications. Please discuss with pcp and pediatrician if needed, especially if breastfeeding  * Ice twice daily to three times daily.  * Tylenol as needed for pain  * Injections: consider in future should pain persist despite trial of mentioned recommendations. Please discuss with pediatrician as she's breastfeeding.  * Return to clinic  as needed.            Eleuterio Liu M.D., M.S.  Dept. of Orthopaedic Surgery  [facility]        Again, thank you for allowing me to participate in the care of your patient.        Sincerely,        Eleuterio Liu MD

## 2023-05-14 ENCOUNTER — HEALTH MAINTENANCE LETTER (OUTPATIENT)
Age: 48
End: 2023-05-14

## 2023-05-18 ENCOUNTER — THERAPY VISIT (OUTPATIENT)
Dept: PHYSICAL THERAPY | Facility: REHABILITATION | Age: 48
End: 2023-05-18
Payer: COMMERCIAL

## 2023-05-18 DIAGNOSIS — M62.81 GENERALIZED MUSCLE WEAKNESS: Primary | ICD-10-CM

## 2023-05-18 DIAGNOSIS — M54.50 ACUTE LEFT-SIDED LOW BACK PAIN WITHOUT SCIATICA: ICD-10-CM

## 2023-05-18 PROCEDURE — 97110 THERAPEUTIC EXERCISES: CPT | Mod: GP | Performed by: PHYSICAL THERAPY ASSISTANT

## 2023-05-23 ENCOUNTER — THERAPY VISIT (OUTPATIENT)
Dept: PHYSICAL THERAPY | Facility: REHABILITATION | Age: 48
End: 2023-05-23
Payer: COMMERCIAL

## 2023-05-23 DIAGNOSIS — M62.81 GENERALIZED MUSCLE WEAKNESS: Primary | ICD-10-CM

## 2023-05-23 DIAGNOSIS — M54.50 ACUTE LEFT-SIDED LOW BACK PAIN WITHOUT SCIATICA: ICD-10-CM

## 2023-05-23 PROCEDURE — 97110 THERAPEUTIC EXERCISES: CPT | Mod: GP | Performed by: PHYSICAL THERAPY ASSISTANT

## 2023-05-30 ENCOUNTER — THERAPY VISIT (OUTPATIENT)
Dept: PHYSICAL THERAPY | Facility: REHABILITATION | Age: 48
End: 2023-05-30
Payer: COMMERCIAL

## 2023-05-30 DIAGNOSIS — M62.81 GENERALIZED MUSCLE WEAKNESS: Primary | ICD-10-CM

## 2023-05-30 DIAGNOSIS — M54.50 ACUTE LEFT-SIDED LOW BACK PAIN WITHOUT SCIATICA: ICD-10-CM

## 2023-05-30 PROCEDURE — 97110 THERAPEUTIC EXERCISES: CPT | Mod: GP | Performed by: PHYSICAL THERAPY ASSISTANT

## 2023-05-31 ENCOUNTER — THERAPY VISIT (OUTPATIENT)
Dept: OCCUPATIONAL THERAPY | Facility: CLINIC | Age: 48
End: 2023-05-31
Attending: ORTHOPAEDIC SURGERY
Payer: COMMERCIAL

## 2023-05-31 DIAGNOSIS — M25.532 LEFT WRIST PAIN: ICD-10-CM

## 2023-05-31 DIAGNOSIS — M65.4 TENOSYNOVITIS, DE QUERVAIN: ICD-10-CM

## 2023-05-31 PROCEDURE — 97760 ORTHOTIC MGMT&TRAING 1ST ENC: CPT | Mod: GO | Performed by: OCCUPATIONAL THERAPIST

## 2023-05-31 PROCEDURE — 97535 SELF CARE MNGMENT TRAINING: CPT | Mod: GO | Performed by: OCCUPATIONAL THERAPIST

## 2023-05-31 PROCEDURE — 97165 OT EVAL LOW COMPLEX 30 MIN: CPT | Mod: GO | Performed by: OCCUPATIONAL THERAPIST

## 2023-05-31 PROCEDURE — 97110 THERAPEUTIC EXERCISES: CPT | Mod: GO | Performed by: OCCUPATIONAL THERAPIST

## 2023-05-31 NOTE — PROGRESS NOTES
OCCUPATIONAL THERAPY EVALUATION  Type of Visit: Evaluation    See electronic medical record for Abuse and Falls Screening details.    Subjective      Presenting condition or subjective complaint: Tendinitis in left wrist, thumb base.  Date of onset: 23 (Referral)    Relevant medical history: Currently pregnant or breastfeeding; Depression; Overweight; Pregnant or breastfeeding; Smoking; Vision problems   Dates & types of surgery:  x 2    Prior diagnostic imaging/testing results:       Prior therapy history for the same diagnosis, illness or injury: No      Prior Level of Function   Transfers: Independent  Ambulation: Independent  ADL: Independent  IADL: Childcare, Driving, Finances, Housekeeping, Laundry, Meal preparation, Medication management, Work, Yard work    Living Environment  Social support: With a significant other or spouse   Type of home: Apartment/condo   Stairs to enter the home: No   Is there a railing: No   Ramp: No   Stairs inside the home: No   Is there a railing: No   Help at home: None  Equipment owned:       Employment: Yes   Hobbies/Interests: Reading, walks, music, dancing, crossword puzzles    Patient goals for therapy: Carry more/heavier stuff in left hand, less pain.     Objective   ADDITIONAL HISTORY:  Right hand dominant  Patient reports symptoms of pain, stiffness/loss of motion, weakness/loss of strength, edema, numbness and tingling   Transportation: drives  Currently working in normal job without restrictions. Works as a .     Functional Outcome Measure:   Upper Extremity Functional Index Score:  SCORE:   Column Totals: /80: 65   (A lower score indicates greater disability.)    PAIN:  Pain Level at Rest: 2/10  Pain Level with Use: 5/10  Pain Location: 1st [facility]  Pain Quality: Sharp and Stabbing  Pain Frequency: constant  Pain is Worst: daytime  Pain is Exacerbated By: Childcare, lifting, serving  Pain is Relieved By: NSAIDs, rest and Orthotic wear   Pain  Progression: Improved    POSTURE: Normal     EDEMA: Mild  Moderate appreciable to left 1st DWC.     SENSATION: Decreased Dorsal Radial Sensory Nerve distribution per pt report     Thumb ROM  Left AROM Right AROM    MP Joint 0/63 ++ 0/71+   IP Joint  0/19 ++ 0/0+   Radial Abduction 41++ 53   Kapandji Opposition Scale (0-10/10) 9.5/10 + 10/10     SPECIAL TESTS:   De Quervain's Special Tests  Pain Report Left Right   Finkelstein's Test +++ (severe) + (mild)   Radial Nerve Tinel's (DRSN) - (not present) - (not present)   Crepitus Present + (mild) - (not present)   WHAT Test +++ (severe) + (mild)       RESISTED TESTING: Resisted Testing (pain report)   Left   Wrist Ext with RD, Elbow at side - (not present)   Wrist Ext with UD, Elbow at side - (not present)   Wrist Ext with RD, Elbow Ext - (not present)   Wrist Ext with UD, Elbow Ext - (not present)   Wrist Flex with RD, Elbow at side - (not present)   Wrist Flex with UD, Elbow at side - (not present)   Long Finger Test - (not present)   APL +++ (severe)   EPB +++ (severe)      STRENGTH:     Measured in pounds 5/31/2023 5/31/2023    Left Right   Trial 1 48 72   Average       Lateral Pinch  Measured in pounds 5/31/2023 5/31/2023    Left Right   Trial 1 12 14   Average       PALPATION:   Wrist Palpation    1st Dorsal Compartment ++ (mod)     Assessment & Plan   CLINICAL IMPRESSIONS   Medical Diagnosis: Dequervain's Tenosynovitis    Treatment Diagnosis: Dequervain's Tenosynovitis    Impression/Assessment: Pt is a 47 year old female presenting to Occupational Therapy due to DeQuervain's Tenosynovitis.  The following significant findings have been identified: Impaired activity tolerance, Impaired coordination, Impaired ROM, Impaired safety/judgement, Impaired strength and Pain.  These identified deficits interfere with their ability to perform self care tasks, community mobility, school tasks, medication management, financial management,  yard work and meal planning and  preparation as compared to previous level of function.   Patient's limitations or Problem List includes: Pain, Decreased ROM/motion, Weakness, Sensory disturbance, Hypomobility, Decreased , Decreased pinch, Decreased coordination, Decreased dexterity, Tightness in musculature and Adherence in connective tissue of the bilateral wrist, hand and thumb which interferes with the patient's ability to perform Self Care Tasks (dressing), Work Tasks, Sleep Patterns, Recreational Activities, Household Chores and Driving  as compared to previous level of function.  Clinical Decision Making (Complexity):   Assessment of Occupational Performance: 3-5 Performance Deficits  Occupational Performance Limitations: dressing, feeding, care of others, child rearing, communication management, driving and community mobility, home establishment and management, meal preparation and cleanup, shopping, sleep, work and leisure activities  Clinical Decision Making (Complexity): Low complexity    PLAN OF CARE  Treatment Interventions:   Modalities:  US  Therapeutic Exercise:  AROM, AAROM, PROM, Tendon Gliding, Isotonics, Isometrics and Stabilization  Neuromuscular re-education:  Nerve Gliding, Coordination/Dexterity, Desensitization, Proprioceptive Training, Posture, Kinesiotaping, Isometrics and Stabilization  Manual Techniques:  Coordination/Dexterity, Joint mobilization, Friction massage, Myofascial release and Manual edema mobilization  Orthotic Fabrication:  Static and Forearm based  Self Care:  Self Care Tasks, Ergonomic Considerations and Work Tasks    Long Term Goals   OT Goal 1  Goal Identifier: STG  Goal Description: Patient will lift child from crib with moderate discomfort.  Rationale: In order to maximize safety and independence with performance of self-care activities  Goal Progress: New  Target Date: 07/12/23  OT Goal 2  Goal Identifier: LTG  Goal Description: Patient will lift child from crib with mild discomfort.  Rationale:  In order to maximize safety and independence with performance of self-care activities  Goal Progress: New  Target Date: 07/26/23      Frequency of Treatment: 1x/week  Duration of Treatment: 8 weeks     Education Assessment: Learner/Method: Patient;Pictures/Video;Demonstration;Reading;Listening  Education Comments: Education on DeQuervain's A+P, arthrokinematics, pathophysiology, joint protection, activity modification with ADL,  and work, orthosis purpose, wear schedule and care.     Risks and benefits of evaluation/treatment have been explained.   Patient/Family/caregiver agrees with Plan of Care.     Evaluation Time:    OT Eval, Low Complexity Minutes (25929): 21  Signing Clinician: TORSTEN Azul [facility]                                                                                   OUTPATIENT OCCUPATIONAL THERAPY      PLAN OF TREATMENT FOR OUTPATIENT REHABILITATION   Patient's Last Name, First Name, M.ICierra  Marley Weldon  MANUEL YOB: 1975   Provider's Name   [facility]   Medical Record No.  [id number]     Onset Date: 05/12/23 (Referral) Start of Care Date: 05/31/23     Medical Diagnosis:  Dequervain's Tenosynovitis      OT Treatment Diagnosis:  Dequervain's Tenosynovitis Plan of Treatment  Frequency/Duration:1x/week/8 weeks    Certification date from 05/31/23   To 07/26/23        See note for plan of treatment details and functional goals     Sean Martinez OT                         I CERTIFY THE NEED FOR THESE SERVICES FURNISHED UNDER        THIS PLAN OF TREATMENT AND WHILE UNDER MY CARE     (Physician attestation of this document indicates review and certification of the therapy plan).                  Referring Provider:  Eleuterio Liu      Initial Assessment  See Epic Evaluation- 05/31/23

## 2023-06-07 ENCOUNTER — THERAPY VISIT (OUTPATIENT)
Dept: OCCUPATIONAL THERAPY | Facility: CLINIC | Age: 48
End: 2023-06-07
Attending: ORTHOPAEDIC SURGERY
Payer: COMMERCIAL

## 2023-06-07 DIAGNOSIS — M25.532 LEFT WRIST PAIN: Primary | ICD-10-CM

## 2023-06-07 PROCEDURE — 97140 MANUAL THERAPY 1/> REGIONS: CPT | Mod: GO | Performed by: OCCUPATIONAL THERAPIST

## 2023-06-14 ENCOUNTER — THERAPY VISIT (OUTPATIENT)
Dept: OCCUPATIONAL THERAPY | Facility: CLINIC | Age: 48
End: 2023-06-14
Attending: ORTHOPAEDIC SURGERY
Payer: COMMERCIAL

## 2023-06-14 DIAGNOSIS — M25.532 LEFT WRIST PAIN: Primary | ICD-10-CM

## 2023-06-14 PROCEDURE — 97110 THERAPEUTIC EXERCISES: CPT | Mod: GO | Performed by: OCCUPATIONAL THERAPIST

## 2023-06-14 PROCEDURE — 97140 MANUAL THERAPY 1/> REGIONS: CPT | Mod: GO | Performed by: OCCUPATIONAL THERAPIST

## 2023-06-15 ENCOUNTER — THERAPY VISIT (OUTPATIENT)
Dept: PHYSICAL THERAPY | Facility: REHABILITATION | Age: 48
End: 2023-06-15
Payer: COMMERCIAL

## 2023-06-15 DIAGNOSIS — M54.50 ACUTE LEFT-SIDED LOW BACK PAIN WITHOUT SCIATICA: Primary | ICD-10-CM

## 2023-06-15 PROCEDURE — 97110 THERAPEUTIC EXERCISES: CPT | Mod: GP

## 2023-06-28 ENCOUNTER — THERAPY VISIT (OUTPATIENT)
Dept: OCCUPATIONAL THERAPY | Facility: CLINIC | Age: 48
End: 2023-06-28
Payer: COMMERCIAL

## 2023-06-28 DIAGNOSIS — M25.532 LEFT WRIST PAIN: Primary | ICD-10-CM

## 2023-06-28 PROCEDURE — 97112 NEUROMUSCULAR REEDUCATION: CPT | Mod: GO | Performed by: OCCUPATIONAL THERAPIST

## 2023-06-28 PROCEDURE — 97140 MANUAL THERAPY 1/> REGIONS: CPT | Mod: GO | Performed by: OCCUPATIONAL THERAPIST

## 2023-06-29 ENCOUNTER — THERAPY VISIT (OUTPATIENT)
Dept: PHYSICAL THERAPY | Facility: REHABILITATION | Age: 48
End: 2023-06-29
Payer: COMMERCIAL

## 2023-06-29 DIAGNOSIS — M54.50 ACUTE LEFT-SIDED LOW BACK PAIN WITHOUT SCIATICA: Primary | ICD-10-CM

## 2023-06-29 PROCEDURE — 97112 NEUROMUSCULAR REEDUCATION: CPT | Mod: GP

## 2023-06-29 PROCEDURE — 97140 MANUAL THERAPY 1/> REGIONS: CPT | Mod: GP

## 2023-06-29 PROCEDURE — 97110 THERAPEUTIC EXERCISES: CPT | Mod: GP

## 2023-07-12 ENCOUNTER — THERAPY VISIT (OUTPATIENT)
Dept: OCCUPATIONAL THERAPY | Facility: CLINIC | Age: 48
End: 2023-07-12
Payer: COMMERCIAL

## 2023-07-12 DIAGNOSIS — M25.532 LEFT WRIST PAIN: Primary | ICD-10-CM

## 2023-07-12 PROCEDURE — 97140 MANUAL THERAPY 1/> REGIONS: CPT | Mod: GO | Performed by: OCCUPATIONAL THERAPIST

## 2023-07-27 ENCOUNTER — THERAPY VISIT (OUTPATIENT)
Dept: PHYSICAL THERAPY | Facility: REHABILITATION | Age: 48
End: 2023-07-27
Payer: COMMERCIAL

## 2023-07-27 DIAGNOSIS — M62.81 GENERALIZED MUSCLE WEAKNESS: Primary | ICD-10-CM

## 2023-07-27 DIAGNOSIS — M54.50 ACUTE LEFT-SIDED LOW BACK PAIN WITHOUT SCIATICA: ICD-10-CM

## 2023-07-27 PROCEDURE — 97110 THERAPEUTIC EXERCISES: CPT | Mod: GP | Performed by: PHYSICAL THERAPY ASSISTANT

## 2023-07-27 PROCEDURE — 97140 MANUAL THERAPY 1/> REGIONS: CPT | Mod: GP | Performed by: PHYSICAL THERAPY ASSISTANT

## 2023-07-28 NOTE — PROGRESS NOTES
[facility]                                                                                   OUTPATIENT PHYSICAL THERAPY    PLAN OF TREATMENT FOR OUTPATIENT REHABILITATION   Patient's Last Name, First Name, Marley Garcia YOB: 1975   Provider's Name   [facility]   Medical Record No.  [id number]     Onset Date: 03/31/23  Start of Care Date: 04/06/23     Medical Diagnosis:  M54.50 (ICD-10-CM) - Acute left-sided low back pain without sciatica      PT Treatment Diagnosis:  Low back pain Plan of Treatment  Frequency/Duration: 1x/wk/ 12 weeks    Certification date from 06/30/23 to 09/20/23         See note for plan of treatment details and functional goals     SHEFALI BROWN, PT                          06/29/23 0500   Appointment Info   Signing clinician's name / credentials Kriss Alvarado, PT, DPT   Total/Authorized Visits 10   Visits Used 6/8-10   Medical Diagnosis M54.50 (ICD-10-CM) - Acute left-sided low back pain without sciatica   PT Tx Diagnosis Low back pain   Precautions/Limitations none   Quick Adds Certification   Progress Note/Certification   Start of Care Date 04/06/23   Onset of illness/injury or Date of Surgery 03/31/23   Therapy Frequency 1x/wk   Predicted Duration 12 weeks   Certification date from 06/30/23   Certification date to 09/20/23   Progress Note Completed Date 07/28/23   Supervision   PT Assistant Visit Number 4   PT Goal 1   Goal Identifier HEP   Goal Description Initated HEP, Patient education, Verbal and tactile cues utilized to facilitate correct exercise technique   Goal Progress ongoing   Target Date 06/29/23   PT Goal 2   Goal Identifier bending   Goal Description pt will be able to bend with pain level <2/10 for chores and ADLs   Goal Progress progressing towards   Target Date 06/29/23   PT Goal 3   Goal Identifier lifting   Goal Description pt will be able to bend with pain level <2/10 for chores and  ADLs   Goal Progress ongoing has modified   Target Date 06/29/23   PT Goal 4   Goal Identifier walking   Goal Description Pt will be able to walk on treadmill with pain level <2/10 for physical activity   Goal Progress MET   Target Date 06/29/23   Subjective Report   Subjective Report Pt arrives reporting back is feeling alright, back pain is more some days than others. Was able to try some HEP the past week and felt this helped with back pain when had more soreness with heavier lifting. Has been doing more outdoor activities with daughter in the summer. Wrist is sore with lifting baby.   Objective Measures   Objective Measures Objective Measure 1   Objective Measure 1   Objective Measure CAROLYN   Details 20% (eval)   Objective Measure 2   Objective Measure Lumbar ROM   Details flexion: WNL, extension: WNL SB WNL B: rotation: WNL B   Treatment Interventions (PT)   Interventions Therapeutic Procedure/Exercise;Neuromuscular Re-education;Manual Therapy   Therapeutic Procedure/Exercise   Therapeutic Procedures: strength, endurance, ROM, flexibillity minutes (87420) 15   Ther Proc 1 - Details For improved axial rotation - nustep level 5, steps 317, 5 minutes with subjective measures taken. Pt reports L low back soreness with activity. For improved core strength and mobility - bridges x 10, deadbugs x 10 each side, cues to keep hips level. Clamshells x 10 each side. SL hip abduction x 10 each side - cues to keep hips stacked for improved glute medius activation. Prone alternating leg lifts.   Skilled Intervention Progressed HEP, Patient education, Verbal and tactile cues utilized to facilitate correct exercise technique   Patient Response/Progress Tolerated progression and increased reps well, verbalizes understandng   Neuromuscular Re-education   Neuromuscular re-ed of mvmt, balance, coord, kinesthetic sense, posture, proprioception minutes (24896) 13   Neuro Re-ed 1 - Details To promote core stabilization - Performed in  supine with yoga ball x 10 reps of each: supine rolls, hip and knee flexion, bridge on ball, single leg bridge.   Skilled Intervention Core stabilization   Patient Response/Progress Tolerated well, no pain with activity. Fatigued.   Manual Therapy   Manual Therapy: Mobilization, MFR, MLD, friction massage minutes (70455) 10   Manual Therapy 1 - Details To promote improved low back pain and decreased L muscle pain. TPR to L QL and reector spinae. STM to L lumbar paraspinals and upper glute. Prone UPA's to lumbar L3-5 and sacrum grade 2-3.   Skilled Intervention STM   Patient Response/Progress Tolerated well, improved pain post MT   Plan   Homework PTRx (phone and print)   Home program Bridge, clamshell banded, QL stretch, hip flexor stretch, DKC, supine march, SL hip AB, birddog   Updates to plan of care Added core stabilization   Plan for next session Review HEP - core strengthening. L sided STM as appropriate. Lifting mechanics with child. Walking program   Comments   Comments Pt returns today for low back pain and L glute pain. Has been able to perform more activities with less pain thouogh will continue to have L sided glute pain occasionally with increased lifting tasks. Continues to demonstrate decreased core strength though was able to tolerate more strengthening and progress to use of yoga ball today. Good response to MT. Appropriate to continue PT at this time.   Total Session Time   Timed Code Treatment Minutes 38   Total Treatment Time (sum of timed and untimed services) 38   Medicare Claim Information   Medical Diagnosis M54.50 (ICD-10-CM) - Acute left-sided low back pain without sciatica   PT Diagnosis Low back pain   Start of Care Date 04/06/23   Onset date of current episode/exacerbation 03/31/23   Certification date from 04/06/23   Ortho Goal 1   Goal Identifier HEP   Goal Description Initated HEP, Patient education, Verbal and tactile cues utilized to facilitate correct exercise technique   Target  Date 06/29/23   Ortho Goal 2   Goal Identifier Bending   Goal Description pt will be able to bend with pain level <2/10 for chores and ADLs   Target Date 06/29/23   Goal Progress ongoing   Ortho Goal 3   Goal Identifier Lifting   Goal Description Pt will be able to carry son with pain level <2/10 for childcare duties   Target Date 06/29/23   Goal Progress improving   Ortho Goal 4   Goal Identifier Walking   Goal Description Pt will be able to walk on treadmill with pain level <2/10 for physical activity   Target Date 06/29/23   Goal Progress Pt has been able to walk at 4.5 and 5 miles MPH with less pain     I CERTIFY THE NEED FOR THESE SERVICES FURNISHED UNDER        THIS PLAN OF TREATMENT AND WHILE UNDER MY CARE     (Physician attestation of this document indicates review and certification of the therapy plan).                Referring Provider:  Thony Moscoso      Initial Assessment  See Epic Evaluation- Start of Care Date: 04/06/23

## 2023-08-08 ENCOUNTER — THERAPY VISIT (OUTPATIENT)
Dept: PHYSICAL THERAPY | Facility: REHABILITATION | Age: 48
End: 2023-08-08
Payer: COMMERCIAL

## 2023-08-08 DIAGNOSIS — M54.50 ACUTE LEFT-SIDED LOW BACK PAIN WITHOUT SCIATICA: Primary | ICD-10-CM

## 2023-08-08 PROCEDURE — 97110 THERAPEUTIC EXERCISES: CPT | Mod: GP

## 2023-08-12 ENCOUNTER — HEALTH MAINTENANCE LETTER (OUTPATIENT)
Age: 48
End: 2023-08-12

## 2023-09-07 ENCOUNTER — OFFICE VISIT (OUTPATIENT)
Dept: FAMILY MEDICINE | Facility: CLINIC | Age: 48
End: 2023-09-07
Payer: COMMERCIAL

## 2023-09-07 VITALS
HEART RATE: 84 BPM | WEIGHT: 177.4 LBS | OXYGEN SATURATION: 99 % | RESPIRATION RATE: 18 BRPM | DIASTOLIC BLOOD PRESSURE: 76 MMHG | SYSTOLIC BLOOD PRESSURE: 116 MMHG | TEMPERATURE: 98.6 F | BODY MASS INDEX: 30.93 KG/M2

## 2023-09-07 DIAGNOSIS — R07.0 THROAT PAIN: ICD-10-CM

## 2023-09-07 DIAGNOSIS — J02.0 STREPTOCOCCAL PHARYNGITIS: Primary | ICD-10-CM

## 2023-09-07 LAB — DEPRECATED S PYO AG THROAT QL EIA: POSITIVE

## 2023-09-07 PROCEDURE — 99213 OFFICE O/P EST LOW 20 MIN: CPT | Performed by: NURSE PRACTITIONER

## 2023-09-07 PROCEDURE — 87880 STREP A ASSAY W/OPTIC: CPT | Performed by: NURSE PRACTITIONER

## 2023-09-07 RX ORDER — AMOXICILLIN 500 MG/1
1000 CAPSULE ORAL DAILY
Qty: 20 CAPSULE | Refills: 0 | Status: SHIPPED | OUTPATIENT
Start: 2023-09-07 | End: 2023-09-17

## 2023-09-07 ASSESSMENT — ENCOUNTER SYMPTOMS: SORE THROAT: 1

## 2023-09-07 NOTE — PATIENT INSTRUCTIONS
You are experiencing common virus symptoms. Viruses take 1-2 weeks to resolve on average.      Try over-the-counter cough and cold medication as needed such as Robitussin, ibuprofen for discomfort for the cough/congestion part of this.      Your strep test also came back positive.      No in-person work/school for at least 24 hours following start of treatment or until fever less than 100.4.        Return to clinic if throat is not feeling much better in 2 or 3 days or new symptoms develop.

## 2023-09-07 NOTE — PROCEDURES
Cerumen removal:     Ear flush was used to remove cerumen from rt ear(s) by medical assistant.    No immediate complications noted.      Iman Buitrago, CNP

## 2023-09-07 NOTE — PROGRESS NOTES
Assessment & Plan     Throat pain    - Streptococcus A Rapid Screen w/Reflex to PCR - Clinic Collect    Streptococcal pharyngitis    - amoxicillin (AMOXIL) 500 MG capsule  Dispense: 20 capsule; Refill: 0      Strep is positive today.      No in-person work/school for at least 24 hours following start of treatment or until fever less than 100.4.        Push fluids.  Ibuprofen or Tylenol for pain as directed on package as needed for pain or fever.        Return to clinic if not feeling much better in 2 or 3 days or new symptoms develop.       Is either also having a viral URI or her usual fall allergies at this time.  Recommend trying 24-hour Zyrtec and URI treatment such as Robitussin for the congestion and cough component of this.            Return in about 3 days (around 9/10/2023) for If no better.    Iman Buitrago [facility] JESSE Roach is a 48 year old female who presents to clinic today for the following health issues:  Chief Complaint   Patient presents with    Pharyngitis     Pt states started on Saturday sore throat,cough and ear pain      Pharyngitis         Sore throat, cough and ear pain starting about 5 days ago.  Sore throat rated 6-8 out of 10.  No fevers.  She started getting sweats this morning.    Still having cerumen impaction on the right that she has been trying to get rid of for a long time.    Right ear pain is described as stabbing and intermittent.    Does have some sneezing and congestion with history of fall allergies as well.    Stopped breast-feeding in June.        Review of Systems   HENT:  Positive for sore throat.            Objective    /76 (BP Location: Right arm, Patient Position: Sitting, Cuff Size: Adult Regular)   Pulse 84   Temp 98.6  F (37  C) (Oral)   Resp 18   Wt 80.5 kg (177 lb 6.4 oz)   SpO2 99%   BMI 30.93 kg/m    Physical Exam  Constitutional:       Appearance: Normal appearance.   HENT:      Right Ear: Tympanic  membrane normal. There is impacted cerumen.      Left Ear: Tympanic membrane normal.      Nose: Congestion present.      Mouth/Throat:      Mouth: Mucous membranes are moist.      Pharynx: Posterior oropharyngeal erythema present. No oropharyngeal exudate.      Tonsils: 2+ on the right. 2+ on the left.   Pulmonary:      Effort: Pulmonary effort is normal.      Breath sounds: Normal breath sounds. No wheezing.   Skin:     General: Skin is warm.   Neurological:      Mental Status: She is alert.   Psychiatric:         Mood and Affect: Mood normal.            Results for orders placed or performed in visit on 09/07/23 (from the past 24 hour(s))   Streptococcus A Rapid Screen w/Reflex to PCR - Clinic Collect    Specimen: Throat; Swab   Result Value Ref Range    Group A Strep antigen Positive (A) Negative

## 2023-09-19 ENCOUNTER — ANCILLARY PROCEDURE (OUTPATIENT)
Dept: MAMMOGRAPHY | Facility: CLINIC | Age: 48
End: 2023-09-19
Attending: FAMILY MEDICINE
Payer: COMMERCIAL

## 2023-09-19 DIAGNOSIS — R92.8 ABNORMAL MAMMOGRAM: Primary | ICD-10-CM

## 2023-09-19 DIAGNOSIS — Z12.31 VISIT FOR SCREENING MAMMOGRAM: ICD-10-CM

## 2023-09-19 PROCEDURE — 77067 SCR MAMMO BI INCL CAD: CPT

## 2023-09-28 ENCOUNTER — ANCILLARY PROCEDURE (OUTPATIENT)
Dept: MAMMOGRAPHY | Facility: CLINIC | Age: 48
End: 2023-09-28
Attending: FAMILY MEDICINE
Payer: COMMERCIAL

## 2023-09-28 DIAGNOSIS — R92.8 ABNORMAL MAMMOGRAM: ICD-10-CM

## 2023-09-28 PROCEDURE — 77061 BREAST TOMOSYNTHESIS UNI: CPT | Mod: RT

## 2023-10-08 PROBLEM — M25.532 LEFT WRIST PAIN: Status: RESOLVED | Noted: 2023-05-31 | Resolved: 2023-10-08

## 2023-10-12 ENCOUNTER — OFFICE VISIT (OUTPATIENT)
Dept: OPHTHALMOLOGY | Facility: CLINIC | Age: 48
End: 2023-10-12
Payer: COMMERCIAL

## 2023-10-12 DIAGNOSIS — H52.4 PRESBYOPIA: Primary | ICD-10-CM

## 2023-10-12 PROCEDURE — 92004 COMPRE OPH EXAM NEW PT 1/>: CPT | Performed by: OPTOMETRIST

## 2023-10-12 PROCEDURE — 92015 DETERMINE REFRACTIVE STATE: CPT | Performed by: OPTOMETRIST

## 2023-10-12 ASSESSMENT — CUP TO DISC RATIO
OS_RATIO: 0.15
OD_RATIO: 0.2

## 2023-10-12 ASSESSMENT — CONF VISUAL FIELD
OS_NORMAL: 1
OD_NORMAL: 1
OS_SUPERIOR_TEMPORAL_RESTRICTION: 0
OD_SUPERIOR_NASAL_RESTRICTION: 0
OS_SUPERIOR_NASAL_RESTRICTION: 0
OD_INFERIOR_TEMPORAL_RESTRICTION: 0
OD_SUPERIOR_TEMPORAL_RESTRICTION: 0
OS_INFERIOR_NASAL_RESTRICTION: 0
METHOD: COUNTING FINGERS
OD_INFERIOR_NASAL_RESTRICTION: 0
OS_INFERIOR_TEMPORAL_RESTRICTION: 0

## 2023-10-12 ASSESSMENT — TONOMETRY
OS_IOP_MMHG: 12
OD_IOP_MMHG: 11
IOP_METHOD: TONOPEN

## 2023-10-12 ASSESSMENT — SLIT LAMP EXAM - LIDS
COMMENTS: NORMAL
COMMENTS: NORMAL

## 2023-10-12 ASSESSMENT — REFRACTION_MANIFEST
OS_SPHERE: PLANO
OD_CYLINDER: SPHERE
OS_CYLINDER: SPHERE
OS_ADD: +1.50
OD_SPHERE: PLANO
OD_ADD: +1.50

## 2023-10-12 ASSESSMENT — EXTERNAL EXAM - LEFT EYE: OS_EXAM: NORMAL

## 2023-10-12 ASSESSMENT — EXTERNAL EXAM - RIGHT EYE: OD_EXAM: NORMAL

## 2023-10-12 ASSESSMENT — VISUAL ACUITY
OD_SC: 20/20
OS_SC: 20/20
METHOD: SNELLEN - LINEAR

## 2023-10-12 NOTE — NURSING NOTE
Chief Complaints and History of Present Illnesses   Patient presents with    Annual Eye Exam     Chief Complaint(s) and History of Present Illness(es)       Annual Eye Exam              Laterality: both eyes    Associated symptoms: Negative for eye pain, headache, fatigue and floaters    Pain scale: 0/10

## 2023-10-13 NOTE — PROGRESS NOTES
DISCHARGE  Reason for Discharge: Patient has met all goals.    Equipment Issued: none    Discharge Plan: Patient to continue home program.    Referring Provider:  Thony Moscoso       08/08/23 0500   Appointment Info   Signing clinician's name / credentials Kriss Alvarado, PT, DPT   Total/Authorized Visits 10   Visits Used 8   Medical Diagnosis M54.50 (ICD-10-CM) - Acute left-sided low back pain without sciatica   PT Tx Diagnosis Low back pain   Precautions/Limitations none   Quick Adds Certification   Progress Note/Certification   Start of Care Date 04/06/23   Onset of illness/injury or Date of Surgery 03/31/23   Therapy Frequency 1x/wk   Predicted Duration 12 weeks   Certification date from 06/30/23   Certification date to 09/20/23   Supervision   PT Assistant Visit Number 5   PT Goal 1   Goal Identifier HEP   Goal Description Initated HEP, Patient education, Verbal and tactile cues utilized to facilitate correct exercise technique   Goal Progress ongoing   Target Date 06/29/23   PT Goal 2   Goal Identifier bending   Goal Description pt will be able to bend with pain level <2/10 for chores and ADLs   Goal Progress Met   Target Date 06/29/23   Date Met 08/08/23   PT Goal 3   Goal Identifier lifting   Goal Description pt will be able to bend with pain level <2/10 for chores and ADLs   Goal Progress Met   Target Date 06/29/23   Date Met 08/08/23   PT Goal 4   Goal Identifier walking   Goal Description Pt will be able to walk on treadmill with pain level <2/10 for physical activity   Goal Progress MET   Target Date 06/29/23   Subjective Report   Subjective Report Pt arrives today reporting has tooth ache, crown is painful. Found a new job in [place] as a . More lifting duties. Had several shifts starting already and went well. Overall no significant back pain with working, is feeling like is at a good place to work independently. No pain today. Only time has some back soreness is with prolonged carrying of  "baby. Overall feels like made 60% improvement; continues to have pain with activities knowing \"shouldn't\" do like carrying carrier rather than stroller.   Objective Measures   Objective Measures Objective Measure 1   Objective Measure 1   Objective Measure CAROLYN   Details 20% (eval)   Objective Measure 2   Objective Measure Lumbar ROM   Details flexion: WNL, extension: WNL SB WNL B: rotation: WNL B   Treatment Interventions (PT)   Interventions Therapeutic Procedure/Exercise;Neuromuscular Re-education;Manual Therapy   Therapeutic Procedure/Exercise   Therapeutic Procedures: strength, endurance, ROM, flexibillity minutes (08406) 38   Ther Proc 1 - Details For improved axial rotation - nustep level 5, 399 steps total, 6 minutes with subjective measures taken. To promote core mobility and strength - seated hip hinge x 10, cues for neutral back. STS from elevated surface with overhead 8lb press x 10, cues to engage core with functional tasks. STS x 10. Unilateral farmer carry with 8# weight 50' each side. Standing oblique crunches with 8# weights x 10 each side. Prone alternating leg lifts x 10 B. Standing hip hinges x 10. Discussed progressing exercises with weights over time. Finalized HEP and educated pt on how to return back to gym with lighter weights and increased reps.   Skilled Intervention Verbal and tactile cues utilized to facilitate correct exercise technique   Patient Response/Progress tolerated well, pt requires verbal and tactile cues to perfoem ex's correctly   Manual Therapy   Manual Therapy 1 - Details To promote improved low back pain and decreased L muscle pain. TPR to L QL and reector spinae. STM to L lumbar paraspinals and upper glute.   Skilled Intervention STM   Patient Response/Progress Tolerated well   Plan   Homework PTRx (phone and print)   Home program Bridge, clamshell banded, QL stretch, hip flexor stretch, DKC, supine march, SL hip AB, birddog   Plan for next session DC 6 wks if no contact "   Comments   Comments Pt returns today for her follow upn appointment. Pt met goals and is appropriate to work more independently with management of symptoms as all functional tasks and lifting today went well. Will keep chart open for 6 weeks and if no contact DC   Total Session Time   Timed Code Treatment Minutes 38   Total Treatment Time (sum of timed and untimed services) 38   Medicare Claim Information   Medical Diagnosis M54.50 (ICD-10-CM) - Acute left-sided low back pain without sciatica   PT Diagnosis Low back pain   Start of Care Date 04/06/23   Onset date of current episode/exacerbation 03/31/23   Certification date from 04/06/23   Ortho Goal 1   Goal Identifier HEP   Goal Description Initated HEP, Patient education, Verbal and tactile cues utilized to facilitate correct exercise technique   Target Date 06/29/23   Ortho Goal 2   Goal Identifier Bending   Goal Description pt will be able to bend with pain level <2/10 for chores and ADLs   Target Date 06/29/23   Goal Progress ongoing   Ortho Goal 3   Goal Identifier Lifting   Goal Description Pt will be able to carry son with pain level <2/10 for childcare duties   Target Date 06/29/23   Goal Progress improving   Ortho Goal 4   Goal Identifier Walking   Goal Description Pt will be able to walk on treadmill with pain level <2/10 for physical activity   Target Date 06/29/23   Goal Progress Pt has been able to walk at 4.5 and 5 miles MPH with less pain   SHEFALI BROWN, PT

## 2023-10-15 DIAGNOSIS — Z78.9 USES BIRTH CONTROL: ICD-10-CM

## 2023-10-16 ENCOUNTER — MYC REFILL (OUTPATIENT)
Dept: FAMILY MEDICINE | Facility: CLINIC | Age: 48
End: 2023-10-16
Payer: COMMERCIAL

## 2023-10-16 DIAGNOSIS — F41.9 ANXIETY AND DEPRESSION: Primary | ICD-10-CM

## 2023-10-16 DIAGNOSIS — F32.A ANXIETY AND DEPRESSION: Primary | ICD-10-CM

## 2023-10-16 RX ORDER — ACETAMINOPHEN AND CODEINE PHOSPHATE 120; 12 MG/5ML; MG/5ML
SOLUTION ORAL
Qty: 28 TABLET | Refills: 0 | Status: SHIPPED | OUTPATIENT
Start: 2023-10-16 | End: 2024-03-05

## 2023-10-25 ENCOUNTER — TELEPHONE (OUTPATIENT)
Dept: MIDWIFE SERVICES | Facility: CLINIC | Age: 48
End: 2023-10-25
Payer: COMMERCIAL

## 2023-10-25 NOTE — TELEPHONE ENCOUNTER
Incoming faxed refill request received from [facility] in [place], [place].  Medication requested:    Ferrous Sulfate 325 MG (5GR) tabs, Sig: Take 1 tablet by mouth daily with breakfast    Medication is no longer on patient's active medication list.  Will need new Rx.    Refill request sent to on-call CNM for review.

## 2023-10-25 NOTE — TELEPHONE ENCOUNTER
Automated refill request: iron refill requested. No further supplementation warranted until clinic visit to assess for anemia. Last visit 11/2022.
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
## 2023-10-31 ENCOUNTER — OFFICE VISIT (OUTPATIENT)
Dept: MIDWIFE SERVICES | Facility: CLINIC | Age: 48
End: 2023-10-31
Payer: COMMERCIAL

## 2023-10-31 VITALS — WEIGHT: 175 LBS | DIASTOLIC BLOOD PRESSURE: 68 MMHG | BODY MASS INDEX: 30.51 KG/M2 | SYSTOLIC BLOOD PRESSURE: 118 MMHG

## 2023-10-31 DIAGNOSIS — Z30.9 ENCOUNTER FOR CONTRACEPTIVE MANAGEMENT, UNSPECIFIED TYPE: Primary | ICD-10-CM

## 2023-10-31 PROCEDURE — 99213 OFFICE O/P EST LOW 20 MIN: CPT | Performed by: MIDWIFE

## 2023-10-31 RX ORDER — DEXTROAMPHETAMINE SACCHARATE, AMPHETAMINE ASPARTATE, DEXTROAMPHETAMINE SULFATE AND AMPHETAMINE SULFATE 2.5; 2.5; 2.5; 2.5 MG/1; MG/1; MG/1; MG/1
TABLET ORAL
COMMUNITY
Start: 2023-10-27

## 2023-10-31 RX ORDER — ACETAMINOPHEN AND CODEINE PHOSPHATE 120; 12 MG/5ML; MG/5ML
0.35 SOLUTION ORAL DAILY
Qty: 84 TABLET | Refills: 4 | Status: SHIPPED | OUTPATIENT
Start: 2023-10-31 | End: 2024-03-05

## 2024-01-02 ENCOUNTER — TELEPHONE (OUTPATIENT)
Dept: FAMILY MEDICINE | Facility: CLINIC | Age: 49
End: 2024-01-02
Payer: COMMERCIAL

## 2024-01-02 NOTE — PROGRESS NOTES
"This patient has a laboratory only appointment schedule 01/11/2024. The notes say \"need A1C\". I spoke with patient and she said you had suggested she have another at 1 year. Her last was in January of 2023. Please order or advise patient. Thank You!  "

## 2024-01-09 NOTE — PROGRESS NOTES
Telephone call to patient.  Advised that she is due for physical and pap prior to lab appointment  Patient voices frustration stating that she already spoke to the midwife about doing an A1c and that she was told an order would be entered.  Patient states that she will make an appointment for physical if it is necessary but would prefer to just do labs for now.  Message routed to [facility] for review.

## 2024-01-10 ENCOUNTER — DOCUMENTATION ONLY (OUTPATIENT)
Dept: FAMILY MEDICINE | Facility: CLINIC | Age: 49
End: 2024-01-10
Payer: COMMERCIAL

## 2024-01-10 NOTE — PROGRESS NOTES
PHONE NOTE: Called patient to discuss misunderstanding and apologize for the miscommunication.  Patient thought she was supposed to come back into the clinic for a hemoglobin A1c.  Discussed that she is now due for an annual exam and as noted in her office visit from 10/31/2023, that she will now be due for her Pap, annual exam, fasting lipid panel, fasting 2-hour GTT (if desired), mammogram referral, and any other blood work deemed necessary at the time.  Patient states that she was pretty distressed at the time because she was losing her insurance.  But it was reinstated within 2 weeks and everything is fine now.    She is understanding of the situation and will call tomorrow morning to make an appointment for her annual exam and fasting labs.  Patient also has a lab appointment set up that can be canceled for January 11, 2024.  Patient verbalized understanding and seems satisfied with this plan of care.

## 2024-03-05 ENCOUNTER — OFFICE VISIT (OUTPATIENT)
Dept: MIDWIFE SERVICES | Facility: CLINIC | Age: 49
End: 2024-03-05
Payer: COMMERCIAL

## 2024-03-05 VITALS
DIASTOLIC BLOOD PRESSURE: 66 MMHG | HEIGHT: 64 IN | BODY MASS INDEX: 29.37 KG/M2 | SYSTOLIC BLOOD PRESSURE: 112 MMHG | WEIGHT: 172 LBS

## 2024-03-05 DIAGNOSIS — Z01.419 WELL WOMAN EXAM: Primary | ICD-10-CM

## 2024-03-05 DIAGNOSIS — Z30.9 ENCOUNTER FOR CONTRACEPTIVE MANAGEMENT, UNSPECIFIED TYPE: ICD-10-CM

## 2024-03-05 DIAGNOSIS — L29.9 ITCHING: ICD-10-CM

## 2024-03-05 LAB
BASOPHILS # BLD AUTO: 0 10E3/UL (ref 0–0.2)
BASOPHILS NFR BLD AUTO: 0 %
EOSINOPHIL # BLD AUTO: 0.1 10E3/UL (ref 0–0.7)
EOSINOPHIL NFR BLD AUTO: 2 %
ERYTHROCYTE [DISTWIDTH] IN BLOOD BY AUTOMATED COUNT: 12.5 % (ref 10–15)
HBA1C MFR BLD: 5.4 % (ref 0–5.6)
HCT VFR BLD AUTO: 40.6 % (ref 35–47)
HGB BLD-MCNC: 13.9 G/DL (ref 11.7–15.7)
IMM GRANULOCYTES # BLD: 0 10E3/UL
IMM GRANULOCYTES NFR BLD: 0 %
LYMPHOCYTES # BLD AUTO: 2.6 10E3/UL (ref 0.8–5.3)
LYMPHOCYTES NFR BLD AUTO: 32 %
MCH RBC QN AUTO: 29.8 PG (ref 26.5–33)
MCHC RBC AUTO-ENTMCNC: 34.2 G/DL (ref 31.5–36.5)
MCV RBC AUTO: 87 FL (ref 78–100)
MONOCYTES # BLD AUTO: 0.5 10E3/UL (ref 0–1.3)
MONOCYTES NFR BLD AUTO: 6 %
NEUTROPHILS # BLD AUTO: 4.9 10E3/UL (ref 1.6–8.3)
NEUTROPHILS NFR BLD AUTO: 60 %
PLATELET # BLD AUTO: 349 10E3/UL (ref 150–450)
RBC # BLD AUTO: 4.67 10E6/UL (ref 3.8–5.2)
WBC # BLD AUTO: 8.2 10E3/UL (ref 4–11)

## 2024-03-05 PROCEDURE — 84443 ASSAY THYROID STIM HORMONE: CPT | Performed by: ADVANCED PRACTICE MIDWIFE

## 2024-03-05 PROCEDURE — 99396 PREV VISIT EST AGE 40-64: CPT | Performed by: ADVANCED PRACTICE MIDWIFE

## 2024-03-05 PROCEDURE — 83036 HEMOGLOBIN GLYCOSYLATED A1C: CPT | Performed by: ADVANCED PRACTICE MIDWIFE

## 2024-03-05 PROCEDURE — 85025 COMPLETE CBC W/AUTO DIFF WBC: CPT | Performed by: ADVANCED PRACTICE MIDWIFE

## 2024-03-05 PROCEDURE — 99213 OFFICE O/P EST LOW 20 MIN: CPT | Mod: 25 | Performed by: ADVANCED PRACTICE MIDWIFE

## 2024-03-05 PROCEDURE — 83001 ASSAY OF GONADOTROPIN (FSH): CPT | Performed by: ADVANCED PRACTICE MIDWIFE

## 2024-03-05 PROCEDURE — 80053 COMPREHEN METABOLIC PANEL: CPT | Performed by: ADVANCED PRACTICE MIDWIFE

## 2024-03-05 PROCEDURE — 82166 ASSAY ANTI-MULLERIAN HORM: CPT | Performed by: ADVANCED PRACTICE MIDWIFE

## 2024-03-05 PROCEDURE — 36415 COLL VENOUS BLD VENIPUNCTURE: CPT | Performed by: ADVANCED PRACTICE MIDWIFE

## 2024-03-05 PROCEDURE — 80061 LIPID PANEL: CPT | Performed by: ADVANCED PRACTICE MIDWIFE

## 2024-03-05 RX ORDER — ACETAMINOPHEN AND CODEINE PHOSPHATE 120; 12 MG/5ML; MG/5ML
0.35 SOLUTION ORAL DAILY
Qty: 84 TABLET | Refills: 4 | Status: SHIPPED | OUTPATIENT
Start: 2024-03-05

## 2024-03-05 RX ORDER — CETIRIZINE HYDROCHLORIDE 5 MG/1
5 TABLET ORAL DAILY
COMMUNITY
Start: 2024-03-05 | End: 2024-04-16

## 2024-03-05 RX ORDER — MULTIPLE VITAMINS W/ MINERALS TAB 9MG-400MCG
1 TAB ORAL DAILY
COMMUNITY

## 2024-03-05 ASSESSMENT — ANXIETY QUESTIONNAIRES
GAD7 TOTAL SCORE: 6
3. WORRYING TOO MUCH ABOUT DIFFERENT THINGS: SEVERAL DAYS
IF YOU CHECKED OFF ANY PROBLEMS ON THIS QUESTIONNAIRE, HOW DIFFICULT HAVE THESE PROBLEMS MADE IT FOR YOU TO DO YOUR WORK, TAKE CARE OF THINGS AT HOME, OR GET ALONG WITH OTHER PEOPLE: SOMEWHAT DIFFICULT
5. BEING SO RESTLESS THAT IT IS HARD TO SIT STILL: NOT AT ALL
7. FEELING AFRAID AS IF SOMETHING AWFUL MIGHT HAPPEN: SEVERAL DAYS
GAD7 TOTAL SCORE: 6
2. NOT BEING ABLE TO STOP OR CONTROL WORRYING: SEVERAL DAYS
8. IF YOU CHECKED OFF ANY PROBLEMS, HOW DIFFICULT HAVE THESE MADE IT FOR YOU TO DO YOUR WORK, TAKE CARE OF THINGS AT HOME, OR GET ALONG WITH OTHER PEOPLE?: SOMEWHAT DIFFICULT
6. BECOMING EASILY ANNOYED OR IRRITABLE: SEVERAL DAYS
4. TROUBLE RELAXING: SEVERAL DAYS
1. FEELING NERVOUS, ANXIOUS, OR ON EDGE: SEVERAL DAYS
7. FEELING AFRAID AS IF SOMETHING AWFUL MIGHT HAPPEN: SEVERAL DAYS
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
## 2024-03-06 ENCOUNTER — MYC MEDICAL ADVICE (OUTPATIENT)
Dept: MIDWIFE SERVICES | Facility: CLINIC | Age: 49
End: 2024-03-06
Payer: COMMERCIAL

## 2024-03-06 DIAGNOSIS — J30.2 SEASONAL ALLERGIC RHINITIS, UNSPECIFIED TRIGGER: Primary | ICD-10-CM

## 2024-03-06 LAB
ALBUMIN SERPL BCG-MCNC: 4.4 G/DL (ref 3.5–5.2)
ALP SERPL-CCNC: 53 U/L (ref 40–150)
ALT SERPL W P-5'-P-CCNC: 20 U/L (ref 0–50)
ANION GAP SERPL CALCULATED.3IONS-SCNC: 10 MMOL/L (ref 7–15)
AST SERPL W P-5'-P-CCNC: 20 U/L (ref 0–45)
BILIRUB SERPL-MCNC: 0.3 MG/DL
BUN SERPL-MCNC: 11.6 MG/DL (ref 6–20)
CALCIUM SERPL-MCNC: 9.2 MG/DL (ref 8.6–10)
CHLORIDE SERPL-SCNC: 106 MMOL/L (ref 98–107)
CHOLEST SERPL-MCNC: 174 MG/DL
CREAT SERPL-MCNC: 0.68 MG/DL (ref 0.51–0.95)
DEPRECATED HCO3 PLAS-SCNC: 24 MMOL/L (ref 22–29)
EGFRCR SERPLBLD CKD-EPI 2021: >90 ML/MIN/1.73M2
FASTING STATUS PATIENT QL REPORTED: YES
FSH SERPL IRP2-ACNC: 5.1 MIU/ML
GLUCOSE SERPL-MCNC: 90 MG/DL (ref 70–99)
HDLC SERPL-MCNC: 50 MG/DL
LDLC SERPL CALC-MCNC: 94 MG/DL
NONHDLC SERPL-MCNC: 124 MG/DL
POTASSIUM SERPL-SCNC: 4.8 MMOL/L (ref 3.4–5.3)
PROT SERPL-MCNC: 7 G/DL (ref 6.4–8.3)
SODIUM SERPL-SCNC: 140 MMOL/L (ref 135–145)
TRIGL SERPL-MCNC: 148 MG/DL
TSH SERPL DL<=0.005 MIU/L-ACNC: 0.55 UIU/ML (ref 0.3–4.2)

## 2024-03-08 LAB
Lab: NORMAL
PERFORMING LABORATORY: NORMAL
SPECIMEN STATUS: NORMAL
TEST NAME: NORMAL

## 2024-03-11 RX ORDER — CETIRIZINE HYDROCHLORIDE 10 MG/1
10 TABLET ORAL DAILY
Qty: 30 TABLET | Refills: 1 | Status: SHIPPED | OUTPATIENT
Start: 2024-03-11 | End: 2024-04-16

## 2024-03-11 NOTE — TELEPHONE ENCOUNTER
Script for Zyrtec 10 mg #30 with 1 RF sent to pharmacy per patient request. Confirmed with patient dose of 10 mg daily.

## 2024-03-12 LAB — MISCELLANEOUS TEST 1 (ARUP): ABNORMAL

## 2024-04-16 DIAGNOSIS — L29.9 ITCHING: Primary | ICD-10-CM

## 2024-04-16 RX ORDER — LORATADINE 10 MG/1
10 TABLET ORAL DAILY
Qty: 30 TABLET | Refills: 1 | Status: SHIPPED | OUTPATIENT
Start: 2024-04-16 | End: 2024-08-15

## 2024-06-30 ENCOUNTER — PATIENT OUTREACH (OUTPATIENT)
Dept: CARE COORDINATION | Facility: CLINIC | Age: 49
End: 2024-06-30
Payer: COMMERCIAL

## 2024-08-15 ENCOUNTER — LAB (OUTPATIENT)
Dept: LAB | Facility: CLINIC | Age: 49
End: 2024-08-15
Payer: COMMERCIAL

## 2024-08-15 ENCOUNTER — OFFICE VISIT (OUTPATIENT)
Dept: ALLERGY | Facility: CLINIC | Age: 49
End: 2024-08-15
Payer: COMMERCIAL

## 2024-08-15 VITALS — HEIGHT: 64 IN | WEIGHT: 168.6 LBS | OXYGEN SATURATION: 98 % | HEART RATE: 78 BPM | BODY MASS INDEX: 28.79 KG/M2

## 2024-08-15 DIAGNOSIS — J31.0 CHRONIC RHINITIS: ICD-10-CM

## 2024-08-15 DIAGNOSIS — L29.9 ITCHING: ICD-10-CM

## 2024-08-15 DIAGNOSIS — L29.9 ITCHING: Primary | ICD-10-CM

## 2024-08-15 LAB — VIT D+METAB SERPL-MCNC: 38 NG/ML (ref 20–50)

## 2024-08-15 PROCEDURE — 36415 COLL VENOUS BLD VENIPUNCTURE: CPT

## 2024-08-15 PROCEDURE — 99203 OFFICE O/P NEW LOW 30 MIN: CPT | Performed by: ALLERGY & IMMUNOLOGY

## 2024-08-15 PROCEDURE — 86003 ALLG SPEC IGE CRUDE XTRC EA: CPT

## 2024-08-15 PROCEDURE — 82785 ASSAY OF IGE: CPT

## 2024-08-15 PROCEDURE — 82306 VITAMIN D 25 HYDROXY: CPT

## 2024-08-15 RX ORDER — LORATADINE 10 MG/1
10 TABLET ORAL DAILY
Qty: 90 TABLET | Refills: 3 | Status: SHIPPED | OUTPATIENT
Start: 2024-08-15

## 2024-08-15 NOTE — LETTER
8/15/2024      Marley Weldon  [address] [address]      Dear Colleague,    Thank you for referring your patient, Marley Weldon, to the [facility]. Please see a copy of my visit note below.          Subjective  Marley is a 49 year old, presenting for the following health issues:  Allergy Consult    HPI     Chief complaint: Itching    History of present illness: This is a pleasant 49-year-old woman I was asked to see for evaluation of itching by Rina Brasher.  She states that the itching began when she was pregnant with her second child.  She states around 8 to 9 months of pregnancy she started to develop significant itching.  Since delivery she continues to have itching that is intermittent a few times per week.  She had 1 episode of hives.  Describes the lesion as red itchy raised bumps that were on her upper chest.  She states that itching is worse around her shoulders and she tries to not have anything brushed against her shoulders as this seems to worsen the symptoms.  If she takes Claritin this improves symptoms.  She is reports that Zyrtec did not help.  She takes Benadryl occasionally at night as symptoms will worsen at night.  She does not take any over-the-counter supplements except for a multivitamin.  She does occasionally use ibuprofen but has not noticed any association with the itching and ibuprofen.  No angioedema.  No belly pain or joint pain accompanying the itching.  She has had a TSH as well as a CBC with differential in March which were normal.  She did supplement with vitamin D during pregnancy but she has not had a recent level.  She believes that her itching may be due to something hormonal.  She does note some seasonal allergy symptoms with some rhinitis.  No asthma.  No prior allergy testing.  Took her Claritin today.    Past medical history: ADHD per the record    Social history: Recently bought a new home, lives in an apartment currently no  "pets    Family history: Mom had a history of hives that were unexplained and has eczema          Objective   Pulse 78   Ht 1.613 m (5' 3.5\")   Wt 76.5 kg (168 lb 9.6 oz)   SpO2 98%   BMI 29.40 kg/m    Body mass index is 29.4 kg/m .  Physical Exam     Gen: Pleasant female not in acute distress  HEENT: Eyes no erythema of the bulbar or palpebral conjunctiva, no edema. Ears: No deformities or lesions. Nose: No congestion,  Mouth: Throat clear, no lip or tongue edema.   Neck: No masses lesions or swelling  Respiratory: No coughing with breathing, no retractions  Lymph: No visible supraclavicular or cervical lymphadenopathy  Skin: No rashes or lesions, dermatographia  Psych: Alert and appropriate for age    Impression report and plan:  1.  Itching  2.  Rhinitis    Patient is currently using her antihistamine.  I recommend a vitamin D level as well as specific IgE testing to the environmental panel given her rhinitis.  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  Discussed this in detail with the patient.  Recommended up to 2 tablets twice daily of the 10 mg Claritin.  She will notify if symptoms or not well-controlled.  Many patients with this to resolve within 2 years.  She will notify if symptoms worsen or not well-controlled.    Signed Electronically by: Billie IVAN MD        Again, thank you for allowing me to participate in the care of your patient.        Sincerely,        Billie IVAN MD  "

## 2024-08-15 NOTE — PROGRESS NOTES
"      Stuart Roach is a 49 year old, presenting for the following health issues:  Allergy Consult    HPI     Chief complaint: Itching    History of present illness: This is a pleasant 49-year-old woman I was asked to see for evaluation of itching by Rina Brasher.  She states that the itching began when she was pregnant with her second child.  She states around 8 to 9 months of pregnancy she started to develop significant itching.  Since delivery she continues to have itching that is intermittent a few times per week.  She had 1 episode of hives.  Describes the lesion as red itchy raised bumps that were on her upper chest.  She states that itching is worse around her shoulders and she tries to not have anything brushed against her shoulders as this seems to worsen the symptoms.  If she takes Claritin this improves symptoms.  She is reports that Zyrtec did not help.  She takes Benadryl occasionally at night as symptoms will worsen at night.  She does not take any over-the-counter supplements except for a multivitamin.  She does occasionally use ibuprofen but has not noticed any association with the itching and ibuprofen.  No angioedema.  No belly pain or joint pain accompanying the itching.  She has had a TSH as well as a CBC with differential in March which were normal.  She did supplement with vitamin D during pregnancy but she has not had a recent level.  She believes that her itching may be due to something hormonal.  She does note some seasonal allergy symptoms with some rhinitis.  No asthma.  No prior allergy testing.  Took her Claritin today.    Past medical history: ADHD per the record    Social history: Recently bought a new home, lives in an apartment currently no pets    Family history: Mom had a history of hives that were unexplained and has eczema          Objective    Pulse 78   Ht 1.613 m (5' 3.5\")   Wt 76.5 kg (168 lb 9.6 oz)   SpO2 98%   BMI 29.40 kg/m    Body mass index is 29.4 " kg/m .  Physical Exam     Gen: Pleasant female not in acute distress  HEENT: Eyes no erythema of the bulbar or palpebral conjunctiva, no edema. Ears: No deformities or lesions. Nose: No congestion,  Mouth: Throat clear, no lip or tongue edema.   Neck: No masses lesions or swelling  Respiratory: No coughing with breathing, no retractions  Lymph: No visible supraclavicular or cervical lymphadenopathy  Skin: No rashes or lesions, dermatographia  Psych: Alert and appropriate for age    Impression report and plan:  1.  Itching  2.  Rhinitis    Patient is currently using her antihistamine.  I recommend a vitamin D level as well as specific IgE testing to the environmental panel given her rhinitis.  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  Discussed this in detail with the patient.  Recommended up to 2 tablets twice daily of the 10 mg Claritin.  She will notify if symptoms or not well-controlled.  Many patients with this to resolve within 2 years.  She will notify if symptoms worsen or not well-controlled.    Signed Electronically by: Billie IVAN MD

## 2024-08-15 NOTE — PATIENT INSTRUCTIONS
Chronic autoimmune hives    Loratidine 10 mg daily --up to 2 tabs twice daily is okay    Check labs    85% of patients resolve within 2 years

## 2024-08-16 LAB
A ALTERNATA IGE QN: <0.1 KU(A)/L
A FUMIGATUS IGE QN: <0.1 KU(A)/L
BERMUDA GRASS IGE QN: 0.84 KU(A)/L
C HERBARUM IGE QN: <0.1 KU(A)/L
CAT DANDER IGG QN: <0.1 KU(A)/L
CEDAR IGE QN: 0.55 KU(A)/L
COMMON RAGWEED IGE QN: 0.73 KU(A)/L
COTTONWOOD IGE QN: 0.54 KU(A)/L
D FARINAE IGE QN: 0.55 KU(A)/L
D PTERONYSS IGE QN: 0.47 KU(A)/L
DOG DANDER+EPITH IGE QN: <0.1 KU(A)/L
IGE SERPL-ACNC: 41 KU/L (ref 0–114)
MAPLE IGE QN: 0.7 KU(A)/L
MARSH ELDER IGE QN: 0.77 KU(A)/L
MOUSE URINE PROT IGE QN: <0.1 KU(A)/L
NETTLE IGE QN: 0.46 KU(A)/L
P NOTATUM IGE QN: <0.1 KU(A)/L
ROACH IGE QN: 1.28 KU(A)/L
SALTWORT IGE QN: 0.72 KU(A)/L
SILVER BIRCH IGE QN: 0.5 KU(A)/L
TIMOTHY IGE QN: 0.76 KU(A)/L
WHITE ASH IGE QN: 0.81 KU(A)/L
WHITE ELM IGE QN: 0.75 KU(A)/L
WHITE MULBERRY IGE QN: 0.47 KU(A)/L
WHITE OAK IGE QN: 0.69 KU(A)/L

## 2024-10-29 ENCOUNTER — ANCILLARY PROCEDURE (OUTPATIENT)
Dept: MAMMOGRAPHY | Facility: CLINIC | Age: 49
End: 2024-10-29
Attending: FAMILY MEDICINE
Payer: COMMERCIAL

## 2024-10-29 DIAGNOSIS — Z12.31 VISIT FOR SCREENING MAMMOGRAM: ICD-10-CM

## 2024-10-29 PROCEDURE — 77063 BREAST TOMOSYNTHESIS BI: CPT

## 2025-04-06 ENCOUNTER — HEALTH MAINTENANCE LETTER (OUTPATIENT)
Age: 50
End: 2025-04-06

## 2025-05-05 ENCOUNTER — PATIENT SELF-TRIAGE (OUTPATIENT)
Dept: URGENT CARE | Facility: CLINIC | Age: 50
End: 2025-05-05

## 2025-05-05 NOTE — TELEPHONE ENCOUNTER
"Spoke with pt regarding her response to a questionnaire (see below). She denies blood in stool. She states she has more stomach cramps and notes a change in stool, when she eats dairy products. She states her stools become \"soft/ loose/ thinner\". She is requesting her PCP order a colonoscopy if appropriate.     Routed to PCP for review. Order pending if appropriate. Writer advised last OV noted with PCP was on 3/31/23.    Ellen Rhoades RN on 5/5/2025 at 5:02 PM       "

## 2025-05-06 NOTE — TELEPHONE ENCOUNTER
This writer attempted to contact patient on 05/06/25      Reason for call provider message and left message.      If patient calls back:   Schedule Office Visit appointment  with PCP, document that pt called and close encounter         Leslie Caballero RN

## 2025-05-07 NOTE — TELEPHONE ENCOUNTER
Relayed providers response. Patient verbalized understanding and states she has an annual setup with another provider Julia Mirza NP 5/28/25 and will ask her a referral for colonoscopy.    Closing encounter.       Ambika Carr RN on 5/7/2025 at 9:49 AM

## 2025-05-28 ENCOUNTER — OFFICE VISIT (OUTPATIENT)
Dept: INTERNAL MEDICINE | Facility: CLINIC | Age: 50
End: 2025-05-28
Payer: COMMERCIAL

## 2025-05-28 VITALS
BODY MASS INDEX: 27.7 KG/M2 | RESPIRATION RATE: 16 BRPM | SYSTOLIC BLOOD PRESSURE: 118 MMHG | OXYGEN SATURATION: 98 % | WEIGHT: 162.25 LBS | HEIGHT: 64 IN | DIASTOLIC BLOOD PRESSURE: 70 MMHG | HEART RATE: 89 BPM

## 2025-05-28 DIAGNOSIS — Z86.32 HISTORY OF GESTATIONAL DIABETES: ICD-10-CM

## 2025-05-28 DIAGNOSIS — F41.9 ANXIETY: ICD-10-CM

## 2025-05-28 DIAGNOSIS — Z13.220 LIPID SCREENING: ICD-10-CM

## 2025-05-28 DIAGNOSIS — F32.A ANXIETY AND DEPRESSION: ICD-10-CM

## 2025-05-28 DIAGNOSIS — F41.9 ANXIETY AND DEPRESSION: ICD-10-CM

## 2025-05-28 DIAGNOSIS — Z00.00 ROUTINE GENERAL MEDICAL EXAMINATION AT A HEALTH CARE FACILITY: Primary | ICD-10-CM

## 2025-05-28 DIAGNOSIS — Z12.4 CERVICAL CANCER SCREENING: ICD-10-CM

## 2025-05-28 DIAGNOSIS — F98.8 ATTENTION DEFICIT DISORDER WITHOUT HYPERACTIVITY: ICD-10-CM

## 2025-05-28 DIAGNOSIS — Z12.11 SCREEN FOR COLON CANCER: ICD-10-CM

## 2025-05-28 PROCEDURE — 87624 HPV HI-RISK TYP POOLED RSLT: CPT | Performed by: NURSE PRACTITIONER

## 2025-05-28 RX ORDER — DEXTROAMPHETAMINE SACCHARATE, AMPHETAMINE ASPARTATE, DEXTROAMPHETAMINE SULFATE AND AMPHETAMINE SULFATE 3.75; 3.75; 3.75; 3.75 MG/1; MG/1; MG/1; MG/1
15 TABLET ORAL 2 TIMES DAILY
COMMUNITY

## 2025-05-28 SDOH — HEALTH STABILITY: PHYSICAL HEALTH: ON AVERAGE, HOW MANY DAYS PER WEEK DO YOU ENGAGE IN MODERATE TO STRENUOUS EXERCISE (LIKE A BRISK WALK)?: 3 DAYS

## 2025-05-28 ASSESSMENT — SOCIAL DETERMINANTS OF HEALTH (SDOH): HOW OFTEN DO YOU GET TOGETHER WITH FRIENDS OR RELATIVES?: ONCE A WEEK

## 2025-05-28 ASSESSMENT — PAIN SCALES - GENERAL: PAINLEVEL_OUTOF10: NO PAIN (0)

## 2025-05-28 NOTE — PROGRESS NOTES
Preventive Care Visit  [facility]  Julia Mirza NP, Internal Medicine  May 28, 2025  {Provider  Link to [facility] :061805}    {PROVIDER CHARTING PREFERENCE:758691}    Stuart Roach is a 49 year old, presenting for the following:  Physical        5/28/2025    12:59 PM   Additional Questions   Roomed by Thony COBB CMA   Accompanied by Son          HPI  ***   {MA/LPN/RN Pre-Provider Visit Orders- hCG/UA/Strep (Optional):683533}  {SUPERLIST (Optional):523715}  {additonal problems for provider to add (Optional):374550}  Advance Care Planning  {The storyboard will display whether the patient has ACP docs on file. Hover over the Code section in the storyboard to access the ACP documents. :885127}  {(AWV REQUIRED) Advance Care Planning Reviewed:594732}        5/28/2025   General Health   How would you rate your overall physical health? (!) FAIR   Feel stress (tense, anxious, or unable to sleep) To some extent   (!) STRESS CONCERN      5/28/2025   Nutrition   Three or more servings of calcium each day? Yes   Diet: I don't know   How many servings of fruit and vegetables per day? (!) 2-3   How many sweetened beverages each day? 0-1         5/28/2025   Exercise   Days per week of moderate/strenous exercise 3 days         5/28/2025   Social Factors   Frequency of gathering with friends or relatives Once a week   Worry food won't last until get money to buy more No   Food not last or not have enough money for food? No   Do you have housing? (Housing is defined as stable permanent housing and does not include staying outside in a car, in a tent, in an abandoned building, in an overnight shelter, or couch-surfing.) Yes   Are you worried about losing your housing? No   Lack of transportation? No   Unable to get utilities (heat,electricity)? No         5/28/2025   Dental   Dentist two times every year? (!) NO         Today's PHQ-2 Score:       5/28/2025    12:55 PM   PHQ-2 ( 1999 Pfizer)   Q1: Little  interest or pleasure in doing things 1   Q2: Feeling down, depressed or hopeless 1   PHQ-2 Score 2    Q1: Little interest or pleasure in doing things Several days   Q2: Feeling down, depressed or hopeless Several days   PHQ-2 Score 2       Patient-reported           5/28/2025   Substance Use   Alcohol more than 3/day or more than 7/wk Yes   How often do you have a drink containing alcohol 4 or more times a week   How many alcohol drinks on typical day 3 or 4   How often do you have 5+ drinks at one occasion Never   Audit 2/3 Score 1   How often not able to stop drinking once started Never   How often failed to do what normally expected Never   How often needed first drink in am after a heavy drinking session Never   How often feeling of guilt or remorse after drinking Less than monthly   How often unable to remember what happened the night before Never   Have you or someone else been injured because of your drinking No   Has anyone been concerned or suggested you cut down on drinking No   TOTAL SCORE - AUDIT 6   Do you use any other substances recreationally? (!) CANNABIS PRODUCTS     Social History     Tobacco Use    Smoking status: Former    Smokeless tobacco: Never   Substance Use Topics    Alcohol use: Yes     Alcohol/week: 12.0 standard drinks of alcohol     Types: 12 Standard drinks or equivalent per week    Drug use: Not Currently     Comment: THC Gummies     {Provider  If there are gaps in the social history shown above, please follow the link to update and then refresh the note Link to Social and Substance History :278859}      10/29/2024   LAST FHS-7 RESULTS   1st degree relative breast or ovarian cancer No   Any relative bilateral breast cancer No   Any male have breast cancer No   Any ONE woman have BOTH breast AND ovarian cancer No   Any woman with breast cancer before 50yrs No   2 or more relatives with breast AND/OR ovarian cancer No   2 or more relatives with breast AND/OR bowel cancer No     {If  any of the questions to the FHS7 are answered yes, consider referral for genetic counseling.    Additional indications for genetic referral include personal history of breast or ovarian cancer, genetic mutation in 1st degree relative which increases risk of breast cancer including BRCA1, BRCA2, PRIETO, PALB 2, TP53, CHEK2, PTEN, CDH1, STK11 (per ACS) and/or 1st degree relative with history of pancreatic or high-risk prostate cancer (per NCCN):898679}   {Mammogram Decision Support (Optional):963073}        5/28/2025   STI Screening   New sexual partner(s) since last STI/HIV test? No     History of abnormal Pap smear: { :659821}        Latest Ref Rng & Units 11/3/2020     4:20 PM 5/22/2017     1:26 PM   PAP / HPV   PAP Negative for squamous intraepithelial lesion or malignancy. Negative for squamous intraepithelial lesion or malignancy  Electronically signed by Eden Herrera CT (ASCP) on 11/13/2020 at  3:52 PM    Atypical squamous cells of undetermined significance, cannot exclude HSIL  Electronically signed by Carlos Smalls MD on 5/30/2017 at  9:26 AM      HPV 16 DNA NEG Negative     HPV 18 DNA NEG Negative     Other HR HPV NEG Negative       ASCVD Risk   The 10-year ASCVD risk score (Lotus DK, et al., 2019) is: 0.8%    Values used to calculate the score:      Age: 49 years      Sex: Female      Is Non- : No      Diabetic: No      Tobacco smoker: No      Systolic Blood Pressure: 118 mmHg      Is BP treated: No      HDL Cholesterol: 50 mg/dL      Total Cholesterol: 174 mg/dL        5/28/2025   Contraception/Family Planning   Questions about contraception or family planning No     {Provider  REQUIRED FOR AWV Use the storyboard to review patient history, after sections have been marked as reviewed, refresh note to capture documentation:661759}   Reviewed and updated as needed this visit by Provider                    {HISTORY OPTIONS (Optional):012168}    {IVÁN Reis  "(Optional):485379}     Objective    Exam  /70 (BP Location: Right arm, Patient Position: Sitting, Cuff Size: Adult Regular)   Pulse 89   Resp 16   Ht 1.613 m (5' 3.5\")   Wt 73.6 kg (162 lb 4 oz)   LMP 05/19/2025   SpO2 98%   Breastfeeding No   BMI 28.29 kg/m     Estimated body mass index is 28.29 kg/m  as calculated from the following:    Height as of this encounter: 1.613 m (5' 3.5\").    Weight as of this encounter: 73.6 kg (162 lb 4 oz).    Physical Exam  {Exam Choices (Optional):013410}        Signed Electronically by: Julia Mirza NP  {Email feedback regarding this note to [email or web address]   :473888}  "

## 2025-05-28 NOTE — PATIENT INSTRUCTIONS
Patient Education   Preventive Care Advice   This is general advice given by our system to help you stay healthy. However, your care team may have specific advice just for you. Please talk to your care team about your preventive care needs.  Nutrition  Eat 5 or more servings of fruits and vegetables each day.  Try wheat bread, brown rice and whole grain pasta (instead of white bread, rice, and pasta).  Get enough calcium and vitamin D. Check the label on foods and aim for 100% of the RDA (recommended daily allowance).  Lifestyle  Exercise at least 150 minutes each week  (30 minutes a day, 5 days a week).  Do muscle strengthening activities 2 days a week. These help control your weight and prevent disease.  No smoking.  Wear sunscreen to prevent skin cancer.  Have a dental exam and cleaning every 6 months.  Yearly exams  See your health care team every year to talk about:  Any changes in your health.  Any medicines your care team has prescribed.  Preventive care, family planning, and ways to prevent chronic diseases.  Shots (vaccines)   HPV shots (up to age 26), if you've never had them before.  Hepatitis B shots (up to age 59), if you've never had them before.  COVID-19 shot: Get this shot when it's due.  Flu shot: Get a flu shot every year.  Tetanus shot: Get a tetanus shot every 10 years.  Pneumococcal, hepatitis A, and RSV shots: Ask your care team if you need these based on your risk.  Shingles shot (for age 50 and up)  General health tests  Diabetes screening:  Starting at age 35, Get screened for diabetes at least every 3 years.  If you are younger than age 35, ask your care team if you should be screened for diabetes.  Cholesterol test: At age 39, start having a cholesterol test every 5 years, or more often if advised.  Bone density scan (DEXA): At age 50, ask your care team if you should have this scan for osteoporosis (brittle bones).  Hepatitis C: Get tested at least once in your life.  STIs (sexually  transmitted infections)  Before age 24: Ask your care team if you should be screened for STIs.  After age 24: Get screened for STIs if you're at risk. You are at risk for STIs (including HIV) if:  You are sexually active with more than one person.  You don't use condoms every time.  You or a partner was diagnosed with a sexually transmitted infection.  If you are at risk for HIV, ask about PrEP medicine to prevent HIV.  Get tested for HIV at least once in your life, whether you are at risk for HIV or not.  Cancer screening tests  Cervical cancer screening: If you have a cervix, begin getting regular cervical cancer screening tests starting at age 21.  Breast cancer scan (mammogram): If you've ever had breasts, begin having regular mammograms starting at age 40. This is a scan to check for breast cancer.  Colon cancer screening: It is important to start screening for colon cancer at age 45.  Have a colonoscopy test every 10 years (or more often if you're at risk) Or, ask your provider about stool tests like a FIT test every year or Cologuard test every 3 years.  To learn more about your testing options, visit:   .  For help making a decision, visit:   https://bit.ly/tc90576.  Prostate cancer screening test: If you have a prostate, ask your care team if a prostate cancer screening test (PSA) at age 55 is right for you.  Lung cancer screening: If you are a current or former smoker ages 50 to 80, ask your care team if ongoing lung cancer screenings are right for you.  For informational purposes only. Not to replace the advice of your health care provider. Copyright   2023 [facility]. All rights reserved. Clinically reviewed by the [facility]. idemama [id number] - REV 01/24.  Learning About Stress  What is stress?     Stress is your body's response to a hard situation. Your body can have a physical, emotional, or mental response. Stress is a fact of life for most people, and it  affects everyone differently. What causes stress for you may not be stressful for someone else.  A lot of things can cause stress. You may feel stress when you go on a job interview, take a test, or run a race. This kind of short-term stress is normal and even useful. It can help you if you need to work hard or react quickly. For example, stress can help you finish an important job on time.  Long-term stress is caused by ongoing stressful situations or events. Examples of long-term stress include long-term health problems, ongoing problems at work, or conflicts in your family. Long-term stress can harm your health.  How does stress affect your health?  When you are stressed, your body responds as though you are in danger. It makes hormones that speed up your heart, make you breathe faster, and give you a burst of energy. This is called the fight-or-flight stress response. If the stress is over quickly, your body goes back to normal and no harm is done.  But if stress happens too often or lasts too long, it can have bad effects. Long-term stress can make you more likely to get sick, and it can make symptoms of some diseases worse. If you tense up when you are stressed, you may develop neck, shoulder, or low back pain. Stress is linked to high blood pressure and heart disease.  Stress also harms your emotional health. It can make you drake, tense, or depressed. Your relationships may suffer, and you may not do well at work or school.  What can you do to manage stress?  You can try these things to help manage stress:   Do something active. Exercise or activity can help reduce stress. Walking is a great way to get started. Even everyday activities such as housecleaning or yard work can help.  Try yoga or wesley chi. These techniques combine exercise and meditation. You may need some training at first to learn them.  Do something you enjoy. For example, listen to music or go to a movie. Practice your hobby or do volunteer  "work.  Meditate. This can help you relax, because you are not worrying about what happened before or what may happen in the future.  Do guided imagery. Imagine yourself in any setting that helps you feel calm. You can use online videos, books, or a teacher to guide you.  Do breathing exercises. For example:  From a standing position, bend forward from the waist with your knees slightly bent. Let your arms dangle close to the floor.  Breathe in slowly and deeply as you return to a standing position. Roll up slowly and lift your head last.  Hold your breath for just a few seconds in the standing position.  Breathe out slowly and bend forward from the waist.  Let your feelings out. Talk, laugh, cry, and express anger when you need to. Talking with supportive friends or family, a counselor, or a ayala leader about your feelings is a healthy way to relieve stress. Avoid discussing your feelings with people who make you feel worse.  Write. It may help to write about things that are bothering you. This helps you find out how much stress you feel and what is causing it. When you know this, you can find better ways to cope.  What can you do to prevent stress?  You might try some of these things to help prevent stress:  Manage your time. This helps you find time to do the things you want and need to do.  Get enough sleep. Your body recovers from the stresses of the day while you are sleeping.  Get support. Your family, friends, and community can make a difference in how you experience stress.  Limit your news feed. Avoid or limit time on social media or news that may make you feel stressed.  Do something active. Exercise or activity can help reduce stress. Walking is a great way to get started.  Where can you learn more?  Go to https://www.Lucky Oyster.net/patiented  Enter N032 in the search box to learn more about \"Learning About Stress.\"  Current as of: October 24, 2024  Content Version: 14.4 2024-2025 [employer] " [employer]   Care instructions adapted under license by your healthcare professional. If you have questions about a medical condition or this instruction, always ask your healthcare professional. [employer] disclaims any warranty or liability for your use of this information.    9 Ways to Cut Back on Drinking  Maybe you've found yourself drinking more alcohol than you'd prefer. If you want to cut back, here are some ideas to try.    Think before you drink.  Do you really want a drink, or is it just a habit? If you're used to having a drink at a certain time, try doing something else then.     Look for substitutes.  Find some no-alcohol drinks that you enjoy, like flavored seltzer water, tea with honey, or tonic with a slice of lime. Or try alcohol-free beer or \"virgin\" cocktails (without the alcohol).     Drink more water.  Use water to quench your thirst. Drink a glass of water before you have any alcohol. Have another glass along with every drink or between drinks.     Shrink your drink.  For example, have a bottle of beer instead of a pint. Use a smaller glass for wine. Choose drinks with lower alcohol content (ABV%). Or use less liquor and more mixer in cocktails.     Slow down.  It's easy to drink quickly and without thinking about it. Pay attention, and make each drink last longer.     Do the math.  Total up how much you spend on alcohol each month. How much is that a year? If you cut back, what could you do with the money you save?     Take a break.  Choose a day or two each week when you won't drink at all. Notice how you feel on those days, physically and emotionally. How did you sleep? Do you feel better? Over time, add more break days.     Count calories.  Would you like to lose some weight? For some people that's a good motivator for cutting back. Figure out how many calories are in each drink. How many does that add up to in a day? In a week? In a month?     Practice saying no.  Be ready when " "someone offers you a drink. Try: \"Thanks, I've had enough.\" Or \"Thanks, but I'm cutting back.\" Or \"No, thanks. I feel better when I drink less.\"   Current as of: August 20, 2024  Content Version: 14.4    3633-1836 [employer].   Care instructions adapted under license by your healthcare professional. If you have questions about a medical condition or this instruction, always ask your healthcare professional. [employer] disclaims any warranty or liability for your use of this information.  Substance Use Disorder: Care Instructions  Overview     You can improve your life and health by stopping your use of alcohol or drugs. When you don't drink or use drugs, you may feel and sleep better. You may get along better with your family, friends, and coworkers. There are medicines and programs that can help with substance use disorder.  How can you care for yourself at home?  Here are some ways to help you stay sober and prevent relapse.  If you have been given medicine to help keep you sober or reduce your cravings, be sure to take it exactly as prescribed.  Talk to your doctor about programs that can help you stop using drugs or drinking alcohol.  Do not keep alcohol or drugs in your home.  Plan ahead. Think about what you'll say if other people ask you to drink or use drugs. Try not to spend time with people who drink or use drugs.  Use the time and money spent on drinking or drugs to do something that's important to you.  Preventing a relapse  Have a plan to deal with relapse. Learn to recognize changes in your thinking that lead you to drink or use drugs. Get help before you start to drink or use drugs again.  Try to stay away from situations, friends, or places that may lead you to drink or use drugs.  If you feel the need to drink alcohol or use drugs again, seek help right away. Call a trusted friend or family member. Some people get support from organizations such as Narcotics Anonymous or " [facility] or from treatment facilities.  If you relapse, get help as soon as you can. Some people make a plan with another person that outlines what they want that person to do for them if they relapse. The plan usually includes how to handle the relapse and who to notify in case of relapse.  Don't give up. Remember that a relapse doesn't mean that you have failed. Use the experience to learn the triggers that lead you to drink or use drugs. Then quit again. Recovery is a lifelong process. Many people have several relapses before they are able to quit for good.  Follow-up care is a key part of your treatment and safety. Be sure to make and go to all appointments, and call your doctor if you are having problems. It's also a good idea to know your test results and keep a list of the medicines you take.  When should you call for help?   Call 911  anytime you think you may need emergency care. For example, call if you or someone else:    Has overdosed or has withdrawal signs. Be sure to tell the emergency workers that you are or someone else is using or trying to quit using drugs. Overdose or withdrawal signs may include:  Losing consciousness.  Seizure.  Seeing or hearing things that aren't there (hallucinations).     Is thinking or talking about suicide or harming others.   Where to get help 24 hours a day, 7 days a week   If you or someone you know talks about suicide, self-harm, a mental health crisis, a substance use crisis, or any other kind of emotional distress, get help right away. You can:    Call the Suicide and Crisis Lifeline at 988.     Call 1-774-952-ZNUK (1-353.332.8315).     Text HOME to 695738 to access the Crisis Text Line.   Consider saving these numbers in your phone.  Go to [employer] for more information or to chat online.  Call your doctor now or seek immediate medical care if:    You are having withdrawal symptoms. These may include nausea or vomiting, sweating, shakiness, and anxiety.  "  Watch closely for changes in your health, and be sure to contact your doctor if:    You have a relapse.     You need more help or support to stop.   Where can you learn more?  Go to https://www.[employer].net/patiented  Enter H573 in the search box to learn more about \"Substance Use Disorder: Care Instructions.\"  Current as of: August 20, 2024  Content Version: 14.4    7151-6514 [employer].   Care instructions adapted under license by your healthcare professional. If you have questions about a medical condition or this instruction, always ask your healthcare professional. [employer] disclaims any warranty or liability for your use of this information.       "

## 2025-05-28 NOTE — PROGRESS NOTES
Internal Medicine Preventive Care Visit  [address] [address]  Phone: [phone]  Fax: [phone]          Patient Name: Marley Weldon  Patient Age: 49 year old  YOB: 1975  MRN: [id number]    Date of Visit: 5/28/2025  Primary Provider: Tom Jones    Subjective   Patient presents with:  Physical         5/28/2025    12:59 PM   Additional Questions   Roomed by Thony COBB CMA   Accompanied by Son     HPI:  ***            5/28/2025   Social Factors   Frequency of gathering with friends or relatives Once a week   Worry food won't last until get money to buy more No   Food not last or not have enough money for food? No   Do you have housing? (Housing is defined as stable permanent housing and does not include staying outside in a car, in a tent, in an abandoned building, in an overnight shelter, or couch-surfing.) Yes   Are you worried about losing your housing? No   Lack of transportation? No   Unable to get utilities (heat,electricity)? No         5/28/2025   Dental   Dentist two times every year? (!) NO       Today's PHQ-2 Score:       5/28/2025    12:55 PM   PHQ-2 ( 1999 [employer])   Q1: Little interest or pleasure in doing things 1   Q2: Feeling down, depressed or hopeless 1   PHQ-2 Score 2    Q1: Little interest or pleasure in doing things Several days   Q2: Feeling down, depressed or hopeless Several days   PHQ-2 Score 2       Patient-reported           5/28/2025   Substance Use   Alcohol more than 3/day or more than 7/wk Yes   How often do you have a drink containing alcohol 4 or more times a week   How many alcohol drinks on typical day 3 or 4   How often do you have 5+ drinks at one occasion Never   Audit 2/3 Score 1   How often not able to stop drinking once started Never   How often failed to do what normally expected Never   How often needed first drink in am after a heavy drinking session Never    How often feeling of guilt or remorse after drinking Less than monthly   How often unable to remember what happened the night before Never   Have you or someone else been injured because of your drinking No   Has anyone been concerned or suggested you cut down on drinking No   TOTAL SCORE - AUDIT 6   Do you use any other substances recreationally? (!) CANNABIS PRODUCTS     Social History     Tobacco Use    Smoking status: Former    Smokeless tobacco: Never   Substance Use Topics    Alcohol use: Yes     Alcohol/week: 12.0 standard drinks of alcohol     Types: 12 Standard drinks or equivalent per week    Drug use: Not Currently     Comment: THC Gummies             10/29/2024   LAST FHS-7 RESULTS   1st degree relative breast or ovarian cancer No   Any relative bilateral breast cancer No   Any male have breast cancer No   Any ONE woman have BOTH breast AND ovarian cancer No   Any woman with breast cancer before 50yrs No   2 or more relatives with breast AND/OR ovarian cancer No   2 or more relatives with breast AND/OR bowel cancer No             5/28/2025   STI Screening   New sexual partner(s) since last STI/HIV test? No           Latest Ref Rng & Units 11/3/2020     4:20 PM 5/22/2017     1:26 PM   PAP / HPV   PAP Negative for squamous intraepithelial lesion or malignancy. Negative for squamous intraepithelial lesion or malignancy  Electronically signed by Eden Hrerera CT (ASCP) on 11/13/2020 at  3:52 PM    Atypical squamous cells of undetermined significance, cannot exclude HSIL  Electronically signed by Carlos Smalls MD on 5/30/2017 at  9:26 AM      HPV 16 DNA NEG Negative     HPV 18 DNA NEG Negative     Other HR HPV NEG Negative       ASCVD Risk   The 10-year ASCVD risk score (Lotus GIPSON, et al., 2019) is: 0.8%    Values used to calculate the score:      Age: 49 years      Sex: Female      Is Non- : No      Diabetic: No      Tobacco smoker: No      Systolic Blood  "Pressure: 118 mmHg      Is BP treated: No      HDL Cholesterol: 50 mg/dL      Total Cholesterol: 174 mg/dL        5/28/2025   Contraception/Family Planning   Questions about contraception or family planning No       Patient Active Problem List   Diagnosis    Attention deficit disorder without hyperactivity    Anxiety    Atypical squamous cells cannot exclude high grade squamous intraepithelial lesion on cytologic smear of cervix (ASC-H)    BMI 27.0-27.9,adult    Anemia during pregnancy    History of gestational diabetes    Pruritus of palm    Acute left-sided low back pain without sciatica     Current Scheduled Meds:  Current Outpatient Medications   Medication Sig Dispense Refill    amphetamine-dextroamphetamine (ADDERALL) 15 MG tablet Take 15 mg by mouth 2 times daily.      loratadine (CLARITIN) 10 MG tablet Take 1 tablet (10 mg) by mouth daily 90 tablet 3    multivitamin w/minerals (THERA-VIT-M) tablet Take 1 tablet by mouth daily      norethindrone (MICRONOR) 0.35 MG tablet Take 1 tablet (0.35 mg) by mouth daily 84 tablet 4    sertraline (ZOLOFT) 50 MG tablet Take 1 tablet (50 mg) by mouth daily 50 tablet 2         Objective   Physical Examination:  Vitals:    05/28/25 1300   BP: 118/70   BP Location: Right arm   Patient Position: Sitting   Cuff Size: Adult Regular   Pulse: 89   Resp: 16   SpO2: 98%   Weight: 73.6 kg (162 lb 4 oz)   Height: 1.613 m (5' 3.5\")     Wt Readings from Last 5 Encounters:   05/28/25 73.6 kg (162 lb 4 oz)   08/15/24 76.5 kg (168 lb 9.6 oz)   03/05/24 78 kg (172 lb)   10/31/23 79.4 kg (175 lb)   09/07/23 80.5 kg (177 lb 6.4 oz)     Body mass index is 28.29 kg/m .     Constitutional: In no apparent distress  Eyes: Conjunctivae clear. Non-icteric.   ENT: Tympanic membrane clear with landmarks well visualized bilaterally. Lips and mucosa moist. Pharynx without erythema or exudate.   Respiratory: Clear to auscultation bilaterally. Normal inspiratory and expiratory effort  Cardiovascular: " Regular rate and rhythm. No murmurs, rubs, or gallops. No edema. No carotid bruits.   Genital: EXTERNAL GENITALIA: Normal appearing vulva without masses, tenderness or lesions. PERINEUM: normal and intact. URETHRAL MEATUS: normal VAGINA:  vagina with normal color and without discharge or lesions. CERVIX: normal appearing cervix without discharge or lesions. Non-friable.  Gastrointestinal: Bowel sounds active all four quadrants. Soft, non-tender.   Neuro: Normal gait  Psych: Alert and oriented x3.     Diagnoses managed today:  1. Routine general medical examination at a health care facility    2. Cervical cancer screening    3. Screen for colon cancer    4. History of gestational diabetes    5. Lipid screening    6. Attention deficit disorder without hyperactivity    7. Anxiety and depression    8. Anxiety         Assessment & Plan     Routine general medical examination:  - Reviewed preventive care as appropriate for age/gender/risk factors  - She declines a COVID or hepatitis B vaccine  - Not interested in contraception    Cervical cancer screening:  - Follow-up Pap smear due. Hx of abnormal pap smear, ASC-H +HR HPV other pap in 2017  - Pap smear performed during the visit. Follow-up in 3 to 5 years depending on results.    Screen for colon cancer:  - Screening recommended due to age.  - Order for colonoscopy placed.   - Consider follow-up with gastroenterologist due to concerns regarding certain food triggers of diarrhea     History of gestational diabetes:  - Annual screening for type 2 diabetes recommended. She will schedule a lab appt for a different day due to time constraints today.     Lipid screening:  - Cholesterol screening recommended.    Attention deficit disorder without hyperactivity:  - Patient is currently prescribed adderall 15 mg twice a day. She is followed by psychiatry provider    Anxiety and depression:  - Patient is currently prescribed sertraline 75 mg daily. She is followed by psychiatry  provider        I am having Marley Weldon maintain her sertraline, multivitamin w/minerals, norethindrone, loratadine, and amphetamine-dextroamphetamine.        Orders Placed This Encounter   Procedures    HPV and Gynecologic Cytology Panel - Recommended Age 30 - 65 Years    Hemoglobin A1c    Lipid panel reflex to direct LDL Non-fasting    Colonoscopy Screening  Referral       Follow up: Return in about 1 year (around 5/28/2026) for physical. Sooner if needed.    The longitudinal plan of care for the diagnosis(es)/condition(s) as documented were addressed during this visit. Due to the added complexity in care, I will continue to support Marley in the subsequent management and with ongoing continuity of care.    Julia Mirza, DNP, APRN, CNP   Electronically signed

## 2025-05-29 LAB
HPV HR 12 DNA CVX QL NAA+PROBE: NEGATIVE
HPV16 DNA CVX QL NAA+PROBE: NEGATIVE
HPV18 DNA CVX QL NAA+PROBE: NEGATIVE
HUMAN PAPILLOMA VIRUS FINAL DIAGNOSIS: NORMAL

## 2025-05-30 ENCOUNTER — RESULTS FOLLOW-UP (OUTPATIENT)
Dept: OBGYN | Facility: CLINIC | Age: 50
End: 2025-05-30

## 2025-06-02 LAB
BKR AP ASSOCIATED HPV REPORT: NORMAL
BKR LAB AP GYN ADEQUACY: NORMAL
BKR LAB AP GYN INTERPRETATION: NORMAL
BKR LAB AP LMP: NORMAL
BKR LAB AP PREVIOUS ABNORMAL: NORMAL
PATH REPORT.COMMENTS IMP SPEC: NORMAL
PATH REPORT.COMMENTS IMP SPEC: NORMAL
PATH REPORT.RELEVANT HX SPEC: NORMAL

## 2025-06-03 PROBLEM — R87.611 ATYPICAL SQUAMOUS CELLS CANNOT EXCLUDE HIGH GRADE SQUAMOUS INTRAEPITHELIAL LESION ON CYTOLOGIC SMEAR OF CERVIX (ASC-H): Status: ACTIVE | Noted: 2017-05-30

## 2025-06-18 ENCOUNTER — LAB (OUTPATIENT)
Dept: LAB | Facility: CLINIC | Age: 50
End: 2025-06-18
Payer: COMMERCIAL

## 2025-06-18 DIAGNOSIS — Z86.32 HISTORY OF GESTATIONAL DIABETES: ICD-10-CM

## 2025-06-18 DIAGNOSIS — Z13.220 LIPID SCREENING: ICD-10-CM

## 2025-06-18 LAB
CHOLEST SERPL-MCNC: 192 MG/DL
EST. AVERAGE GLUCOSE BLD GHB EST-MCNC: 108 MG/DL
FASTING STATUS PATIENT QL REPORTED: YES
HBA1C MFR BLD: 5.4 % (ref 0–5.6)
HDLC SERPL-MCNC: 58 MG/DL
LDLC SERPL CALC-MCNC: 99 MG/DL
NONHDLC SERPL-MCNC: 134 MG/DL
TRIGL SERPL-MCNC: 176 MG/DL

## 2025-06-18 PROCEDURE — 83036 HEMOGLOBIN GLYCOSYLATED A1C: CPT

## 2025-06-18 PROCEDURE — 80061 LIPID PANEL: CPT

## 2025-06-18 PROCEDURE — 36415 COLL VENOUS BLD VENIPUNCTURE: CPT

## (undated) DEVICE — SUTURE VICRYL+ 2-0 27IN CT-1 UND VCP259H

## (undated) DEVICE — PACK MINOR SINGLE BASIN SSK3001

## (undated) DEVICE — SOL WATER IRRIG 1000ML BOTTLE 2F7114

## (undated) DEVICE — LINER PRINTED RED HAZARD 24X24 A4824PRRO

## (undated) DEVICE — DRAPE IOBAN 2 C-SECTION 3M 6697

## (undated) DEVICE — PREP CHLORAPREP 26ML TINTED HI-LITE ORANGE 930815

## (undated) DEVICE — PAD INSERT MED PEACH 14X6.5 62550

## (undated) DEVICE — BLADE CLIPPER PIVOT PURPLE DISP 9660

## (undated) DEVICE — ADH SKIN CLOSURE PREMIERPRO EXOFIN 1.0ML 3470

## (undated) DEVICE — SUCTION TIP YANKAUER FLEX ORTHO K62

## (undated) DEVICE — STPL SKIN SUBCUTICULAR INSORB  2030

## (undated) DEVICE — PLATE GROUNDING ADULT W/CORD 9165L

## (undated) DEVICE — SOL NACL 0.9% IRRIG 1000ML BOTTLE 2F7124

## (undated) DEVICE — CUSTOM PACK C-SECTION LHE

## (undated) DEVICE — SUCTION MANIFOLD NEPTUNE 2 SYS 1 PORT 702-025-000

## (undated) DEVICE — SUCTION TIP YANKAUER W/O VENT K86

## (undated) DEVICE — UNDERPAD 30X30 BULK 949B10

## (undated) DEVICE — GLOVE SURG PI ULTRA TOUCH M SZ 6-1/2 LF

## (undated) DEVICE — DRESSING MEPILEX BORDER POST-OP 4X12

## (undated) DEVICE — GOWN IMPERVIOUS BREATHABLE SMART LG 89015

## (undated) DEVICE — SUTURE VICRYL+ 0 36IN CT-1 UND VCP946H

## (undated) DEVICE — PACK MAJOR BASIN 673

## (undated) RX ORDER — FENTANYL CITRATE-0.9 % NACL/PF 10 MCG/ML
PLASTIC BAG, INJECTION (ML) INTRAVENOUS
Status: DISPENSED
Start: 2022-10-05

## (undated) RX ORDER — ONDANSETRON 2 MG/ML
INJECTION INTRAMUSCULAR; INTRAVENOUS
Status: DISPENSED
Start: 2022-10-05

## (undated) RX ORDER — MORPHINE SULFATE 1 MG/ML
INJECTION, SOLUTION EPIDURAL; INTRATHECAL; INTRAVENOUS
Status: DISPENSED
Start: 2022-10-05

## (undated) RX ORDER — KETOROLAC TROMETHAMINE 30 MG/ML
INJECTION, SOLUTION INTRAMUSCULAR; INTRAVENOUS
Status: DISPENSED
Start: 2022-10-05